# Patient Record
Sex: MALE | Race: OTHER | NOT HISPANIC OR LATINO | URBAN - METROPOLITAN AREA
[De-identification: names, ages, dates, MRNs, and addresses within clinical notes are randomized per-mention and may not be internally consistent; named-entity substitution may affect disease eponyms.]

---

## 2021-04-10 ENCOUNTER — EMERGENCY (EMERGENCY)
Facility: HOSPITAL | Age: 76
LOS: 1 days | Discharge: HOME | End: 2021-04-10
Admitting: EMERGENCY MEDICINE
Payer: MEDICAID

## 2021-04-10 VITALS
HEART RATE: 65 BPM | RESPIRATION RATE: 17 BRPM | TEMPERATURE: 98 F | SYSTOLIC BLOOD PRESSURE: 108 MMHG | OXYGEN SATURATION: 100 % | DIASTOLIC BLOOD PRESSURE: 78 MMHG

## 2021-04-10 VITALS
OXYGEN SATURATION: 99 % | HEART RATE: 75 BPM | DIASTOLIC BLOOD PRESSURE: 74 MMHG | TEMPERATURE: 98 F | SYSTOLIC BLOOD PRESSURE: 116 MMHG | RESPIRATION RATE: 18 BRPM

## 2021-04-10 LAB
ALBUMIN SERPL ELPH-MCNC: 4.3 G/DL — SIGNIFICANT CHANGE UP (ref 3.3–5)
ALP SERPL-CCNC: 66 U/L — SIGNIFICANT CHANGE UP (ref 40–120)
ALT FLD-CCNC: 18 U/L — SIGNIFICANT CHANGE UP (ref 4–41)
ANION GAP SERPL CALC-SCNC: 11 MMOL/L — SIGNIFICANT CHANGE UP (ref 7–14)
APPEARANCE UR: CLEAR — SIGNIFICANT CHANGE UP
AST SERPL-CCNC: 17 U/L — SIGNIFICANT CHANGE UP (ref 4–40)
BASOPHILS # BLD AUTO: 0.05 K/UL — SIGNIFICANT CHANGE UP (ref 0–0.2)
BASOPHILS NFR BLD AUTO: 0.6 % — SIGNIFICANT CHANGE UP (ref 0–2)
BILIRUB SERPL-MCNC: 0.5 MG/DL — SIGNIFICANT CHANGE UP (ref 0.2–1.2)
BILIRUB UR-MCNC: NEGATIVE — SIGNIFICANT CHANGE UP
BUN SERPL-MCNC: 19 MG/DL — SIGNIFICANT CHANGE UP (ref 7–23)
CALCIUM SERPL-MCNC: 9.3 MG/DL — SIGNIFICANT CHANGE UP (ref 8.4–10.5)
CHLORIDE SERPL-SCNC: 106 MMOL/L — SIGNIFICANT CHANGE UP (ref 98–107)
CO2 SERPL-SCNC: 22 MMOL/L — SIGNIFICANT CHANGE UP (ref 22–31)
COLOR SPEC: SIGNIFICANT CHANGE UP
CREAT SERPL-MCNC: 1.88 MG/DL — HIGH (ref 0.5–1.3)
DIFF PNL FLD: NEGATIVE — SIGNIFICANT CHANGE UP
EOSINOPHIL # BLD AUTO: 0.34 K/UL — SIGNIFICANT CHANGE UP (ref 0–0.5)
EOSINOPHIL NFR BLD AUTO: 4.2 % — SIGNIFICANT CHANGE UP (ref 0–6)
GLUCOSE SERPL-MCNC: 93 MG/DL — SIGNIFICANT CHANGE UP (ref 70–99)
GLUCOSE UR QL: NEGATIVE — SIGNIFICANT CHANGE UP
HCT VFR BLD CALC: 43.6 % — SIGNIFICANT CHANGE UP (ref 39–50)
HGB BLD-MCNC: 14.6 G/DL — SIGNIFICANT CHANGE UP (ref 13–17)
IANC: 4.78 K/UL — SIGNIFICANT CHANGE UP (ref 1.5–8.5)
IMM GRANULOCYTES NFR BLD AUTO: 0.1 % — SIGNIFICANT CHANGE UP (ref 0–1.5)
KETONES UR-MCNC: NEGATIVE — SIGNIFICANT CHANGE UP
LEUKOCYTE ESTERASE UR-ACNC: NEGATIVE — SIGNIFICANT CHANGE UP
LYMPHOCYTES # BLD AUTO: 2.05 K/UL — SIGNIFICANT CHANGE UP (ref 1–3.3)
LYMPHOCYTES # BLD AUTO: 25.6 % — SIGNIFICANT CHANGE UP (ref 13–44)
MCHC RBC-ENTMCNC: 29.1 PG — SIGNIFICANT CHANGE UP (ref 27–34)
MCHC RBC-ENTMCNC: 33.5 GM/DL — SIGNIFICANT CHANGE UP (ref 32–36)
MCV RBC AUTO: 87 FL — SIGNIFICANT CHANGE UP (ref 80–100)
MONOCYTES # BLD AUTO: 0.78 K/UL — SIGNIFICANT CHANGE UP (ref 0–0.9)
MONOCYTES NFR BLD AUTO: 9.7 % — SIGNIFICANT CHANGE UP (ref 2–14)
NEUTROPHILS # BLD AUTO: 4.78 K/UL — SIGNIFICANT CHANGE UP (ref 1.8–7.4)
NEUTROPHILS NFR BLD AUTO: 59.8 % — SIGNIFICANT CHANGE UP (ref 43–77)
NITRITE UR-MCNC: NEGATIVE — SIGNIFICANT CHANGE UP
NRBC # BLD: 0 /100 WBCS — SIGNIFICANT CHANGE UP
NRBC # FLD: 0 K/UL — SIGNIFICANT CHANGE UP
PCP SPEC-MCNC: SIGNIFICANT CHANGE UP
PH UR: 6.5 — SIGNIFICANT CHANGE UP (ref 5–8)
PLATELET # BLD AUTO: 210 K/UL — SIGNIFICANT CHANGE UP (ref 150–400)
POTASSIUM SERPL-MCNC: 4.4 MMOL/L — SIGNIFICANT CHANGE UP (ref 3.5–5.3)
POTASSIUM SERPL-SCNC: 4.4 MMOL/L — SIGNIFICANT CHANGE UP (ref 3.5–5.3)
PROT SERPL-MCNC: 7.3 G/DL — SIGNIFICANT CHANGE UP (ref 6–8.3)
PROT UR-MCNC: ABNORMAL
RBC # BLD: 5.01 M/UL — SIGNIFICANT CHANGE UP (ref 4.2–5.8)
RBC # FLD: 13.5 % — SIGNIFICANT CHANGE UP (ref 10.3–14.5)
SARS-COV-2 RNA SPEC QL NAA+PROBE: SIGNIFICANT CHANGE UP
SODIUM SERPL-SCNC: 139 MMOL/L — SIGNIFICANT CHANGE UP (ref 135–145)
SP GR SPEC: 1.02 — SIGNIFICANT CHANGE UP (ref 1.01–1.02)
TOXICOLOGY SCREEN, DRUGS OF ABUSE, SERUM RESULT: SIGNIFICANT CHANGE UP
TSH SERPL-MCNC: 0.92 UIU/ML — SIGNIFICANT CHANGE UP (ref 0.27–4.2)
UROBILINOGEN FLD QL: SIGNIFICANT CHANGE UP
WBC # BLD: 8.01 K/UL — SIGNIFICANT CHANGE UP (ref 3.8–10.5)
WBC # FLD AUTO: 8.01 K/UL — SIGNIFICANT CHANGE UP (ref 3.8–10.5)

## 2021-04-10 PROCEDURE — 99285 EMERGENCY DEPT VISIT HI MDM: CPT

## 2021-04-10 RX ORDER — HALOPERIDOL DECANOATE 100 MG/ML
5 INJECTION INTRAMUSCULAR ONCE
Refills: 0 | Status: COMPLETED | OUTPATIENT
Start: 2021-04-10 | End: 2021-04-10

## 2021-04-10 RX ADMIN — HALOPERIDOL DECANOATE 5 MILLIGRAM(S): 100 INJECTION INTRAMUSCULAR at 17:24

## 2021-04-10 NOTE — ED ADULT NURSE NOTE - HPI (INCLUDE ILLNESS QUALITY, SEVERITY, DURATION, TIMING, CONTEXT, MODIFYING FACTORS, ASSOCIATED SIGNS AND SYMPTOMS)
Pt reports hearing voices telling him to kill himself after having sexual dreams. PT appears to be sexually focus in his description of recent HX. At time of assessment PT is calm and compliant with care. PT offers no medical complaints. Pt reports being complaint with meds although can not name them

## 2021-04-10 NOTE — ED BEHAVIORAL HEALTH NOTE - BEHAVIORAL HEALTH NOTE
Pt is a 75 year old male arriving from [facility] assisted living facility. Writer contacted [facility] at [phone]. Writer spoke with Austyn at  who reports that pt went to hospital because he was hearing voices. Staff member believes that pt asked to come to hospital. Staff denies any safety concerns for him or his behavior says he would have been told of additional information if there had been. No further information able to be provided and declined other staff being available to speak with. Pt was said to have a mental health hx however staff member reported “not qualified to give that information”. No concerns for pt returning to residence at this time. Pt said to be able to travel independently by “[facility]”. Verbal huddle occurred with REINALDO Caceres. Ambulance transportation to be arranged for safety. Pt is a 75 year old male arriving from [facility] assisted living facility. Writer contacted [facility] at [phone]. Writer spoke with Austyn at  who reports that pt went to hospital because he was hearing voices. Staff member believes that pt asked to come to hospital. Staff denies any safety concerns for him or his behavior says he would have been told of additional information if there had been. No further information able to be provided and declined other staff being available to speak with. Pt was said to have a mental health hx however staff member reported “not qualified to give that information”. No concerns for pt returning to residence at this time. Pt said to be able to travel independently by “[facility]”. Verbal huddle occurred with REINALDO Caceres. Ambulance transportation to be arranged for safety.    MEDICAID #[id number]  [employer] #[id number]  MEDICARE Pt is a 75 year old male arriving from [facility] assisted living facility. Writer contacted [facility] at [phone]. Writer spoke with Austyn at  who reports that pt went to hospital because he was hearing voices. Staff member believes that pt asked to come to hospital. Staff denies any safety concerns for him or his behavior says he would have been told of additional information if there had been. No further information able to be provided and declined other staff being available to speak with. Pt was said to have a mental health hx however staff member reported “not qualified to give that information”. No concerns for pt returning to residence at this time. Pt said to be able to travel independently by “[facility]”. Verbal huddle occurred with REINALDO Caceres. Ambulance transportation to be arranged for safety.    Insurance:   MEDICAID #[id number]  [employer] #[id number] Pt is a 75 year old male arriving from [facility] assisted living facility. Writer contacted [facility] at [phone]. Writer spoke with Austyn at  who reports that pt went to hospital because he was hearing voices. Staff member believes that pt asked to come to hospital. Staff denies any safety concerns for him or his behavior says he would have been told of additional information if there had been. No further information able to be provided and declined other staff being available to speak with. Pt was said to have a mental health hx however staff member reported “not qualified to give that information”. No concerns for pt returning to residence at this time. Pt said to be able to travel independently by “[facility]”. Verbal huddle occurred with REINALDO Caceres. Ambulance transportation to be arranged for safety.    Writer called back [facility] and spoke again with Austyn who was informed of pt's discharge. Writer received the following insurance information for pt as none listed in chart (MEDICAID #[id number]  and [employer] #[id number]). Writer searched [facility] only portal with pt not found. No medicare found to be listed as per staff member. Writer contacted [facility] and spoke with Alfonso who scheduled trip with trip #[id number]. Call transferred to nursing for report.

## 2021-04-10 NOTE — ED ADULT NURSE REASSESSMENT NOTE - NS ED NURSE REASSESS COMMENT FT1
Break RN: Pt is calm and cooperating, sitting comfortable, breathing non-labored. Will continue to monitor

## 2021-04-10 NOTE — ED PROVIDER NOTE - CLINICAL SUMMARY MEDICAL DECISION MAKING FREE TEXT BOX
74 y/o hx  M B  Medical evaluation performed. There is no clinical evidence of intoxication or any acute medical problem requiring immediate intervention.  Medication offered. Tolerated same well.   SW consulted. D/C to [facility] via EMS. 74 y/o hx  M   Labs, Urine Tox/UA, EKG    Medical evaluation performed. There is no clinical evidence of intoxication or any acute medical problem requiring immediate intervention.  Medication offered. Tolerated same well.   SW consulted. D/C to [facility] via EMS.

## 2021-04-10 NOTE — ED PROVIDER NOTE - QUALITY
anxiety producing Metronidazole Pregnancy And Lactation Text: This medication is Pregnancy Category B and considered safe during pregnancy.  It is also excreted in breast milk.

## 2021-04-10 NOTE — ED ADULT TRIAGE NOTE - CHIEF COMPLAINT QUOTE
Pt BIBA from [facility] facility, PMH of HTN and schizophrenia, states he takes his psych meds but doesn't remember the name of them. Pt reports non-command auditory hallucinations for the past 2 years but states they got worse for the past 2 days. Pt denies visual hallucinations, denies alcohol or drug use, denies any physical complaints. Denies SI/HI

## 2021-04-10 NOTE — ED ADULT TRIAGE NOTE - NS ED NURSE BANDS TYPE
symptoms  Outcome: Ongoing     Problem: Nutrition  Goal: Optimal nutrition therapy  Outcome: Ongoing  Note:   Pt with Osmolite infusing via PEG per order. Pt tolerating at this time. Will continue to collaborate with RDs. Name band;

## 2021-04-10 NOTE — ED PROVIDER NOTE - OBJECTIVE STATEMENT
74 y/o hx  M BIBA from  [facility]  secondary to  anxiousness.   Admits to  that the residents yells and screams to the point that  he consistently hear voices. States " I need a time away from there".  Explicitly  denies SI/HI/VH.  Denies falling, punching or kicking any objects. Denies pain, SOB,  fever, chills, chest/ abdominal discomfort.   Denies use of alcohol or illicit drugs.  No evidence of physical injuries, broken skin or deformities.

## 2021-04-10 NOTE — ED ADULT NURSE NOTE - CHIEF COMPLAINT QUOTE
Pt BIBA from [facility] facility, PMH of HTN and schizophrenia, states he takes his psych meds but doesn't remember the name of them. Pt reports non-command auditory hallucinations for the past 2 years but states they got worse for the past 2 days. Pt denies visual hallucinations, denies alcohol or drug use, denies any physical complaints. Denies SI/HI

## 2021-04-10 NOTE — ED PROVIDER NOTE - PATIENT PORTAL LINK FT
You can access the FollowMyHealth Patient Portal offered by [facility] by registering at the following website: [email or web address]. By joining [facility]’s FollowMyHealth portal, you will also be able to view your health information using other applications (apps) compatible with our system.

## 2021-04-10 NOTE — ED ADULT NURSE NOTE - NSIMPLEMENTINTERV_GEN_ALL_ED
Implemented All Universal Safety Interventions:  Aldie to call system. Call bell, personal items and telephone within reach. Instruct patient to call for assistance. Room bathroom lighting operational. Non-slip footwear when patient is off stretcher. Physically safe environment: no spills, clutter or unnecessary equipment. Stretcher in lowest position, wheels locked, appropriate side rails in place.

## 2021-04-11 LAB
COVID-19 SPIKE DOMAIN AB INTERP: POSITIVE
COVID-19 SPIKE DOMAIN ANTIBODY RESULT: >250 U/ML — HIGH
SARS-COV-2 IGG+IGM SERPL QL IA: >250 U/ML — HIGH
SARS-COV-2 IGG+IGM SERPL QL IA: POSITIVE

## 2021-04-13 NOTE — ED POST DISCHARGE NOTE - REASON FOR FOLLOW-UP
Other COVID-19 AB : positive. Patient contact # [phone] message left with Call Back  P.A. number and hours for return call back. No alt # listed.

## 2022-03-25 ENCOUNTER — EMERGENCY (EMERGENCY)
Facility: HOSPITAL | Age: 77
LOS: 0 days | Discharge: HOME | End: 2022-03-25
Attending: EMERGENCY MEDICINE | Admitting: EMERGENCY MEDICINE
Payer: MEDICAID

## 2022-03-25 VITALS
OXYGEN SATURATION: 97 % | HEART RATE: 59 BPM | SYSTOLIC BLOOD PRESSURE: 169 MMHG | TEMPERATURE: 98 F | WEIGHT: 179.9 LBS | RESPIRATION RATE: 18 BRPM | DIASTOLIC BLOOD PRESSURE: 91 MMHG

## 2022-03-25 VITALS
DIASTOLIC BLOOD PRESSURE: 89 MMHG | HEART RATE: 60 BPM | SYSTOLIC BLOOD PRESSURE: 155 MMHG | RESPIRATION RATE: 16 BRPM | OXYGEN SATURATION: 98 %

## 2022-03-25 DIAGNOSIS — R44.0 AUDITORY HALLUCINATIONS: ICD-10-CM

## 2022-03-25 DIAGNOSIS — F20.9 SCHIZOPHRENIA, UNSPECIFIED: ICD-10-CM

## 2022-03-25 DIAGNOSIS — R00.1 BRADYCARDIA, UNSPECIFIED: ICD-10-CM

## 2022-03-25 DIAGNOSIS — F41.9 ANXIETY DISORDER, UNSPECIFIED: ICD-10-CM

## 2022-03-25 DIAGNOSIS — Z20.822 CONTACT WITH AND (SUSPECTED) EXPOSURE TO COVID-19: ICD-10-CM

## 2022-03-25 DIAGNOSIS — F32.A DEPRESSION, UNSPECIFIED: ICD-10-CM

## 2022-03-25 DIAGNOSIS — I49.1 ATRIAL PREMATURE DEPOLARIZATION: ICD-10-CM

## 2022-03-25 DIAGNOSIS — F17.200 NICOTINE DEPENDENCE, UNSPECIFIED, UNCOMPLICATED: ICD-10-CM

## 2022-03-25 DIAGNOSIS — F03.90 UNSPECIFIED DEMENTIA WITHOUT BEHAVIORAL DISTURBANCE: ICD-10-CM

## 2022-03-25 LAB
ANION GAP SERPL CALC-SCNC: 10 MMOL/L — SIGNIFICANT CHANGE UP (ref 7–14)
APAP SERPL-MCNC: <5 UG/ML — LOW (ref 10–30)
BASOPHILS # BLD AUTO: 0.06 K/UL — SIGNIFICANT CHANGE UP (ref 0–0.2)
BASOPHILS NFR BLD AUTO: 0.9 % — SIGNIFICANT CHANGE UP (ref 0–1)
BUN SERPL-MCNC: 17 MG/DL — SIGNIFICANT CHANGE UP (ref 10–20)
CALCIUM SERPL-MCNC: 9.3 MG/DL — SIGNIFICANT CHANGE UP (ref 8.5–10.1)
CHLORIDE SERPL-SCNC: 107 MMOL/L — SIGNIFICANT CHANGE UP (ref 98–110)
CO2 SERPL-SCNC: 24 MMOL/L — SIGNIFICANT CHANGE UP (ref 17–32)
CREAT SERPL-MCNC: 1.5 MG/DL — SIGNIFICANT CHANGE UP (ref 0.7–1.5)
EGFR: 49 ML/MIN/1.73M2 — LOW
EOSINOPHIL # BLD AUTO: 0.54 K/UL — SIGNIFICANT CHANGE UP (ref 0–0.7)
EOSINOPHIL NFR BLD AUTO: 8.5 % — HIGH (ref 0–8)
ETHANOL SERPL-MCNC: <10 MG/DL — SIGNIFICANT CHANGE UP
GLUCOSE SERPL-MCNC: 88 MG/DL — SIGNIFICANT CHANGE UP (ref 70–99)
HCT VFR BLD CALC: 45 % — SIGNIFICANT CHANGE UP (ref 42–52)
HGB BLD-MCNC: 14.9 G/DL — SIGNIFICANT CHANGE UP (ref 14–18)
IMM GRANULOCYTES NFR BLD AUTO: 0.2 % — SIGNIFICANT CHANGE UP (ref 0.1–0.3)
LYMPHOCYTES # BLD AUTO: 1.73 K/UL — SIGNIFICANT CHANGE UP (ref 1.2–3.4)
LYMPHOCYTES # BLD AUTO: 27.4 % — SIGNIFICANT CHANGE UP (ref 20.5–51.1)
MCHC RBC-ENTMCNC: 28.6 PG — SIGNIFICANT CHANGE UP (ref 27–31)
MCHC RBC-ENTMCNC: 33.1 G/DL — SIGNIFICANT CHANGE UP (ref 32–37)
MCV RBC AUTO: 86.4 FL — SIGNIFICANT CHANGE UP (ref 80–94)
MONOCYTES # BLD AUTO: 0.44 K/UL — SIGNIFICANT CHANGE UP (ref 0.1–0.6)
MONOCYTES NFR BLD AUTO: 7 % — SIGNIFICANT CHANGE UP (ref 1.7–9.3)
NEUTROPHILS # BLD AUTO: 3.54 K/UL — SIGNIFICANT CHANGE UP (ref 1.4–6.5)
NEUTROPHILS NFR BLD AUTO: 56 % — SIGNIFICANT CHANGE UP (ref 42.2–75.2)
NRBC # BLD: 0 /100 WBCS — SIGNIFICANT CHANGE UP (ref 0–0)
PLATELET # BLD AUTO: 176 K/UL — SIGNIFICANT CHANGE UP (ref 130–400)
POTASSIUM SERPL-MCNC: 4.1 MMOL/L — SIGNIFICANT CHANGE UP (ref 3.5–5)
POTASSIUM SERPL-SCNC: 4.1 MMOL/L — SIGNIFICANT CHANGE UP (ref 3.5–5)
RBC # BLD: 5.21 M/UL — SIGNIFICANT CHANGE UP (ref 4.7–6.1)
RBC # FLD: 13.7 % — SIGNIFICANT CHANGE UP (ref 11.5–14.5)
SALICYLATES SERPL-MCNC: <0.3 MG/DL — LOW (ref 4–30)
SARS-COV-2 RNA SPEC QL NAA+PROBE: SIGNIFICANT CHANGE UP
SODIUM SERPL-SCNC: 141 MMOL/L — SIGNIFICANT CHANGE UP (ref 135–146)
WBC # BLD: 6.32 K/UL — SIGNIFICANT CHANGE UP (ref 4.8–10.8)
WBC # FLD AUTO: 6.32 K/UL — SIGNIFICANT CHANGE UP (ref 4.8–10.8)

## 2022-03-25 PROCEDURE — 99285 EMERGENCY DEPT VISIT HI MDM: CPT

## 2022-03-25 PROCEDURE — 93010 ELECTROCARDIOGRAM REPORT: CPT

## 2022-03-25 PROCEDURE — 90792 PSYCH DIAG EVAL W/MED SRVCS: CPT | Mod: 95

## 2022-03-25 RX ORDER — LISINOPRIL 2.5 MG/1
1 TABLET ORAL
Qty: 0 | Refills: 0 | DISCHARGE

## 2022-03-25 RX ORDER — LORATADINE 10 MG/1
1 TABLET ORAL
Qty: 0 | Refills: 0 | DISCHARGE

## 2022-03-25 RX ORDER — TRAZODONE HCL 50 MG
1 TABLET ORAL
Qty: 0 | Refills: 0 | DISCHARGE

## 2022-03-25 RX ORDER — OLANZAPINE 15 MG/1
1 TABLET, FILM COATED ORAL
Qty: 0 | Refills: 0 | DISCHARGE

## 2022-03-25 RX ORDER — FLUTICASONE PROPIONATE AND SALMETEROL 50; 250 UG/1; UG/1
1 POWDER ORAL; RESPIRATORY (INHALATION)
Qty: 0 | Refills: 0 | DISCHARGE

## 2022-03-25 RX ORDER — ERGOCALCIFEROL 1.25 MG/1
1 CAPSULE ORAL
Qty: 0 | Refills: 0 | DISCHARGE

## 2022-03-25 RX ORDER — ESCITALOPRAM OXALATE 10 MG/1
1 TABLET, FILM COATED ORAL
Qty: 0 | Refills: 0 | DISCHARGE

## 2022-03-25 RX ORDER — PROPRANOLOL HCL 160 MG
1 CAPSULE, EXTENDED RELEASE 24HR ORAL
Qty: 0 | Refills: 0 | DISCHARGE

## 2022-03-25 NOTE — ED BEHAVIORAL HEALTH ASSESSMENT NOTE - DETAILS
army medic/ deferred self referred; [facility] d/w pt's residence see  Safety Plan note denies

## 2022-03-25 NOTE — ED PROVIDER NOTE - PHYSICAL EXAMINATION
CONSTITUTIONAL: In no apparent distress.   HEAD: Normocephalic; atraumatic.   EYES: Pupils are round and reactive, extra-ocular muscles are intact. Eyelids are normal in appearance without swelling or lesions.   ENT: Hearing is intact with good acuity to spoken voice.  Patient is speaking clearly, not muffled and airway is intact.   NECK: No midline tenderness  RESPIRATORY: No signs of respiratory distress. Lung sounds are clear in all lobes bilaterally without rales, rhonchi, or wheezes.  CARDIOVASCULAR: Regular rate and rhythm.   GI: Abdomen is soft, non-tender, and without distention. Bowel sounds are present and normoactive in all four quadrants. No masses are noted.   NEURO: A & O x2. Normal speech.

## 2022-03-25 NOTE — ED BEHAVIORAL HEALTH ASSESSMENT NOTE - SUMMARY
The patient is a 76-year-old male; domiciled at [facility]; non-caregiver; PPHx of schizophrenia[facility], denies hx of SIB/SA, denies hx of violence; no significant PMHx per ED provider; tobacco use, denies other substance use; BIB EMS activated by residence per pt's request; psychiatry consulted for AH.  Pt reports AH but denies that they are commanding in nature, denies SI/HI, and does not appear acutely depressed, psychotic, manic, anxious, agitated, or intoxicated.  He is pleasant, linear, with full affect (smiling/laughing at times).  His residence staff has no immediate safety concerns.  Pt offered but declined voluntary admission and he does not meet criteria for involuntary admission at this time.

## 2022-03-25 NOTE — ED ADULT TRIAGE NOTE - CHIEF COMPLAINT QUOTE
BIBA from [facility] as per pt "I am hearing voices telling me perverted thoughts and telling me to do bad things for the last 3 days". Denies SI/HI

## 2022-03-25 NOTE — ED BEHAVIORAL HEALTH ASSESSMENT NOTE - SAFETY PLAN ADDT'L DETAILS
Safety plan discussed with.../Education provided regarding environmental safety / lethal means restriction/Provision of National Suicide Prevention Lifeline 5-841-868-JJSB (8369)

## 2022-03-25 NOTE — ED PROVIDER NOTE - NSFOLLOWUPCLINICS_GEN_ALL_ED_FT
[facility]  OB/GYN  [address] [place] [address]  Phone: [phone]  Fax:   Follow Up Time: 1-3 Days     [facility]  OP Mental Health  [address] [place] [id number]  Phone: [phone]  Fax:   Follow Up Time: 1-3 Days

## 2022-03-25 NOTE — ED BEHAVIORAL HEALTH NOTE - BEHAVIORAL HEALTH NOTE
===================  PRE-HOSPITAL COURSE  ===================  SOURCE:  Second-hand information via EMR documentation and primary [facility]eimy.  DETAILS:  Patient BIBA from [facility] with c/o AH.   ============  ED COURSE   ============  SOURCE:  Second-hand information via EMR documentation and primary RNYeimy.  ARRIVAL:  Patient BIBA from [facility] with c/o , with no noted incidents.   BELONGINGS:  Clothing.   BEHAVIOR: Upon arrival patient noted to be in behavioral control - states he's been hearing voices, telling him promiscuous things for the past few days. He denies SI/HI and delusions. He complied with triage protocols - provided blood, urine and changed into a gown without incident. Speech, hygiene and eye contact are all WNL. No aggression or behavioral issues reported.   TREATMENT:  No prn medications, security interventions or behavioral modifications required.   VISITORS:  Unaccompanied by family or social supports.     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   RELIABILITY: Limited.     Collateral (Name, relationship to patient) has requested that the information provided remain confidential: Yes [X  ] No [  ]    Collateral (Name, relationship to patient) has provided information that patient is/may be unaware of:      Yes [ X ] No [  ]  ========================  HPI:    Patients group home clinical staff member states that per second hand information from a non-eyewitness patient reported to staff that he's been hearing voices for the past few days. She notes that patient did not divulge what the voices were telling him; rather stated that the voices are causing him to feel nervous. She denies any vulgar or promiscuous behaviors. She states that at baseline patient is euthymic and mild-mannered and for the past few days has appeared to be worrisome. She notes that patient was hospitalized at [facility] a few months ago due to similar symptoms. She notes that upon his discharge, patient was functioning at baseline and compliant with his medications, of which he remains compliant. She denies any aggression, agitation or obstruction to property. She denies SI/SA/SIB or HI. She notes that patient asked staff to activate EMS to the facility. She has no safety concerns for self-harm or harm to others and believes that patient would benefit from both a medical and psychiatric evaluation.

## 2022-03-25 NOTE — ED PROVIDER NOTE - PATIENT PORTAL LINK FT
You can access the FollowMyHealth Patient Portal offered by [facility] by registering at the following website: [email or web address]. By joining [facility]’s FollowMyHealth portal, you will also be able to view your health information using other applications (apps) compatible with our system.

## 2022-03-25 NOTE — ED PROVIDER NOTE - CLINICAL SUMMARY MEDICAL DECISION MAKING FREE TEXT BOX
Diagnostic testing reviewed.  Patient is medically cleared for psychiatric examination.  Case discussed with psychiatric consultant.  Cleared for discharge.

## 2022-03-25 NOTE — ED PROVIDER NOTE - ATTENDING CONTRIBUTION TO CARE
Patient has a longstanding history of schizoaffective disorder.  He presents to the ED for delusions that involve sexual encounters with devils.  He denies suicidal or homicidal ideation.  In the ED he is calm.  Vital signs noted.  He is alert.  Diagnostic testing reviewed.  Case discussed with psych.  Stable for discharge.

## 2022-03-25 NOTE — ED BEHAVIORAL HEALTH ASSESSMENT NOTE - RISK ASSESSMENT
risk factors: male, single, prior admissions, psych dx    protective factors: no known hx of SIB/SA, no known hx of violence, denies SI/HI, Quaker, denies access to guns/weapons, future oriented, identifies reasons to live, able to safety plan, denies anhedonia, denies insomnia, help seeking Low Acute Suicide Risk

## 2022-03-25 NOTE — ED PROVIDER NOTE - NS ED ATTENDING STATEMENT MOD
This was a shared visit with the SEVERIANO. I reviewed and verified the documentation and independently performed the documented:

## 2022-03-25 NOTE — ED BEHAVIORAL HEALTH ASSESSMENT NOTE - CURRENT MEDICATION
pt unable to recall     per group home paperwork: claritin 10 mg daily, lexapro 20 mg daily, prinivil 10 mg daily, zyprexa 10 mg daily / 20 mg at 5pm, propranolol 10 mg BID, trazodone 50 mg qhs, nicorette tablets PRN, albuterol inhaler PRN, vitamin D2 weekly, vitamin D3 BID, fluticasone/salmeterol inhaler BID, latanprost eye drops, lisinopril 40 mg daily

## 2022-03-25 NOTE — ED BEHAVIORAL HEALTH ASSESSMENT NOTE - HPI (INCLUDE ILLNESS QUALITY, SEVERITY, DURATION, TIMING, CONTEXT, MODIFYING FACTORS, ASSOCIATED SIGNS AND SYMPTOMS)
The patient is a 76-year-old male; domiciled at [facility]; non-caregiver; PPHx of schizophrenia, [facility], [facility], denies hx of SIB/SA, denies hx of violence; no significant PMHx per ED provider; tobacco use, denies other substance use; BIB EMS activated by residence per pt's request; psychiatry consulted for AH.  Pt states he is being "tormented by spirits."  He states that they say "nasty" things about him and other people over the past few days.  He reports chronic hx of AH but states these are different.  He denies that they command him to do anything dangerous but sometimes they think of things for him to say.  He reports depressed/anxious mood due to the AH but otherwise denies depressive symptoms, denies other psychotic symptoms, denies manic symptoms.  He states that he likes his residence compared to his previous one though doesn't get along with his roommate (plans to ask leadership to change rooms).  Pt reports he is able to tend to ADLs though doesn't do them often because he doesn't like to (chronic for "all of his life").  [facility] tell [facility] to bring him there instead.  He was initially ambivalent about wanting to stay in the hospital but then states it might be a problem to stay and go through the admissions process.  He ultimately expressed preference to return to [facility] at this time and states he would call EMS again if needed.  Pt oriented to person, hospital, year, president, 3/3 immediate recall, 3/3 delayed recall.  Pt gives consent for collateral from his group home and outpatient provider.    Writer [facility] ([phone]).  She states that there is no psychiatrist or RN available to speak at this time.      Covid Screen - Patient  testing? pt uncertain, believes someone told him he had covid months ago but that it "cleared up"  vaccine? pt uncertain, believes he received them  exposures? denies

## 2022-03-25 NOTE — ED ADULT NURSE NOTE - CHIEF COMPLAINT QUOTE
BIBA from [facility] as per pt "I am hearing voices telling me perverted thoughts and telling me to do bad things for the last 3 days". Denies SI/HI

## 2022-03-25 NOTE — ED BEHAVIORAL HEALTH ASSESSMENT NOTE - NSSUICPROTFACT_PSY_ALL_CORE
Responsibility to children, family, or others/Identifies reasons for living/Cultural, spiritual and/or moral attitudes against suicide/Positive therapeutic relationships/Islam beliefs

## 2022-03-25 NOTE — ED PROVIDER NOTE - PROGRESS NOTE DETAILS
Spoke with tele psych and waiting for call by from psych attending. Spoke with tele psych and will see the patient pt cleared by psych.  clear for d/c. Discussed results with pt.  All questions were answered and return precautions discussed.  Pt is asx and comfortable at this time.  Unremarkable re-exam.  No further concerns at this time from pt.  Will follow up with PMD.  Pt understands and agrees with tx plan. DOLLY Dykes: I was directly involved in the management of this patient. Case was discussed with DOLLY Rosario

## 2022-03-25 NOTE — ED BEHAVIORAL HEALTH ASSESSMENT NOTE - PAST PSYCHOTROPIC MEDICATION
klonopin, fluphenazine (PO and decanoate), haldol (PO and decanoate), risperdal, wellbutrin, cogentin

## 2022-03-25 NOTE — ED PROVIDER NOTE - OBJECTIVE STATEMENT
77 y/o male with hx of schizophrenia who was sent in from his group home for hearing voices. Pt reports that he has been hearing voices from devils and tell them to have sexual intercourses and oral sex with them. Reports that he had hx of this symptom before. Denies HI and SI. Denies fever, SOB, chest pain, N/V, abdominal pain, urinary symptoms.

## 2022-03-25 NOTE — ED PROVIDER NOTE - IV ALTEPLASE ADMIN OUTSIDE HIDDEN
Reports that one week ago he was notified from sexual partner that she had hepatitis C. He did have unprotected sex with this partner. He denies abdominal pain, jaundice, or abnormal color urine or stool. He reports history of hepatitis several years ago due to IV drug use (he is unsure of type).  
show

## 2022-03-25 NOTE — ED PROVIDER NOTE - NS ED ROS FT
Constitutional: Negative for fever, chills, and fatigue.  HENT: Negative for headache  Cardiovascular: Negative for chest pain, and palpitation.  Respiratory: Negative for SOB  Gastrointestinal: Negative for nausea, vomiting, abdominal pain,  Genitourinary: Negative for flank pain, dysuria, frequency, and hematuria.  Neurological: Negative for dizziness, syncope, and loss of consciousness.  Hematological: Does not bruise/bleed easily.  Psychiatric/Behavioral: + delusion. Negative for anxiety/panic, SI/HI, and memory changes.

## 2022-03-25 NOTE — ED BEHAVIORAL HEALTH ASSESSMENT NOTE - OTHER PAST PSYCHIATRIC HISTORY (INCLUDE DETAILS REGARDING ONSET, COURSE OF ILLNESS, INPATIENT/OUTPATIENT TREATMENT)
per PSY[facility]ES: Schizophrenia | Schizoaffective Disorder | Tobacco related disorder | Major Depressive Disorder | Unspecified/Other Anxiety Disorder | Brief Psychotic Disorder (ICD10 Only) | Dementia (Neurocognitive) | Alcohol related disorders

## 2022-05-13 NOTE — ED BEHAVIORAL HEALTH ASSESSMENT NOTE - NS ED BHA REVIEW OF ED CHART AVAILABLE LABS REVIEWED
Patient presents to ED with parents who report patient Patient states that he noticed a rash this morning. Patient states rash is itchy. Rash to back and face. No swelling to lips. Airway intact. Patient has known allergies to peanuts, tree nuts, and shellfish. Patient denies eating any of these foods. Patient acting age appropriately, in no respiratory distress. Yes

## 2023-01-03 NOTE — ED BEHAVIORAL HEALTH ASSESSMENT NOTE - NS ED BHA DEMOGRAPHICS RACE
Unavailable Cellcept Pregnancy And Lactation Text: This medication is Pregnancy Category D and isn't considered safe during pregnancy. It is unknown if this medication is excreted in breast milk.

## 2024-03-03 ENCOUNTER — EMERGENCY (EMERGENCY)
Facility: HOSPITAL | Age: 79
LOS: 0 days | Discharge: NURSING, REHAB OR LONG-TERM CARE | End: 2024-03-03
Attending: EMERGENCY MEDICINE
Payer: MEDICAID

## 2024-03-03 VITALS
HEART RATE: 88 BPM | SYSTOLIC BLOOD PRESSURE: 126 MMHG | TEMPERATURE: 98 F | RESPIRATION RATE: 18 BRPM | DIASTOLIC BLOOD PRESSURE: 72 MMHG | OXYGEN SATURATION: 99 %

## 2024-03-03 VITALS — RESPIRATION RATE: 17 BRPM | OXYGEN SATURATION: 95 %

## 2024-03-03 DIAGNOSIS — F20.9 SCHIZOPHRENIA, UNSPECIFIED: ICD-10-CM

## 2024-03-03 DIAGNOSIS — F17.200 NICOTINE DEPENDENCE, UNSPECIFIED, UNCOMPLICATED: ICD-10-CM

## 2024-03-03 DIAGNOSIS — I45.10 UNSPECIFIED RIGHT BUNDLE-BRANCH BLOCK: ICD-10-CM

## 2024-03-03 DIAGNOSIS — F22 DELUSIONAL DISORDERS: ICD-10-CM

## 2024-03-03 DIAGNOSIS — Z20.822 CONTACT WITH AND (SUSPECTED) EXPOSURE TO COVID-19: ICD-10-CM

## 2024-03-03 DIAGNOSIS — R44.0 AUDITORY HALLUCINATIONS: ICD-10-CM

## 2024-03-03 LAB
ALBUMIN SERPL ELPH-MCNC: 4 G/DL — SIGNIFICANT CHANGE UP (ref 3.5–5.2)
ALP SERPL-CCNC: 72 U/L — SIGNIFICANT CHANGE UP (ref 30–115)
ALT FLD-CCNC: 14 U/L — SIGNIFICANT CHANGE UP (ref 0–41)
ANION GAP SERPL CALC-SCNC: 10 MMOL/L — SIGNIFICANT CHANGE UP (ref 7–14)
APAP SERPL-MCNC: <5 UG/ML — LOW (ref 10–30)
AST SERPL-CCNC: 16 U/L — SIGNIFICANT CHANGE UP (ref 0–41)
BASOPHILS # BLD AUTO: 0.04 K/UL — SIGNIFICANT CHANGE UP (ref 0–0.2)
BASOPHILS NFR BLD AUTO: 0.7 % — SIGNIFICANT CHANGE UP (ref 0–1)
BILIRUB SERPL-MCNC: 0.4 MG/DL — SIGNIFICANT CHANGE UP (ref 0.2–1.2)
BUN SERPL-MCNC: 15 MG/DL — SIGNIFICANT CHANGE UP (ref 10–20)
CALCIUM SERPL-MCNC: 8.8 MG/DL — SIGNIFICANT CHANGE UP (ref 8.4–10.5)
CHLORIDE SERPL-SCNC: 105 MMOL/L — SIGNIFICANT CHANGE UP (ref 98–110)
CO2 SERPL-SCNC: 25 MMOL/L — SIGNIFICANT CHANGE UP (ref 17–32)
CREAT SERPL-MCNC: 1.6 MG/DL — HIGH (ref 0.7–1.5)
EGFR: 44 ML/MIN/1.73M2 — LOW
EOSINOPHIL # BLD AUTO: 0.55 K/UL — SIGNIFICANT CHANGE UP (ref 0–0.7)
EOSINOPHIL NFR BLD AUTO: 9.8 % — HIGH (ref 0–8)
ETHANOL SERPL-MCNC: <10 MG/DL — SIGNIFICANT CHANGE UP
FLUAV AG NPH QL: SIGNIFICANT CHANGE UP
FLUBV AG NPH QL: SIGNIFICANT CHANGE UP
GLUCOSE SERPL-MCNC: 177 MG/DL — HIGH (ref 70–99)
HCT VFR BLD CALC: 43.6 % — SIGNIFICANT CHANGE UP (ref 42–52)
HGB BLD-MCNC: 14.7 G/DL — SIGNIFICANT CHANGE UP (ref 14–18)
IMM GRANULOCYTES NFR BLD AUTO: 0.2 % — SIGNIFICANT CHANGE UP (ref 0.1–0.3)
LYMPHOCYTES # BLD AUTO: 1.4 K/UL — SIGNIFICANT CHANGE UP (ref 1.2–3.4)
LYMPHOCYTES # BLD AUTO: 24.9 % — SIGNIFICANT CHANGE UP (ref 20.5–51.1)
MCHC RBC-ENTMCNC: 29.5 PG — SIGNIFICANT CHANGE UP (ref 27–31)
MCHC RBC-ENTMCNC: 33.7 G/DL — SIGNIFICANT CHANGE UP (ref 32–37)
MCV RBC AUTO: 87.6 FL — SIGNIFICANT CHANGE UP (ref 80–94)
MONOCYTES # BLD AUTO: 0.33 K/UL — SIGNIFICANT CHANGE UP (ref 0.1–0.6)
MONOCYTES NFR BLD AUTO: 5.9 % — SIGNIFICANT CHANGE UP (ref 1.7–9.3)
NEUTROPHILS # BLD AUTO: 3.29 K/UL — SIGNIFICANT CHANGE UP (ref 1.4–6.5)
NEUTROPHILS NFR BLD AUTO: 58.5 % — SIGNIFICANT CHANGE UP (ref 42.2–75.2)
NRBC # BLD: 0 /100 WBCS — SIGNIFICANT CHANGE UP (ref 0–0)
PLATELET # BLD AUTO: 176 K/UL — SIGNIFICANT CHANGE UP (ref 130–400)
PMV BLD: 10.8 FL — HIGH (ref 7.4–10.4)
POTASSIUM SERPL-MCNC: 4 MMOL/L — SIGNIFICANT CHANGE UP (ref 3.5–5)
POTASSIUM SERPL-SCNC: 4 MMOL/L — SIGNIFICANT CHANGE UP (ref 3.5–5)
PROT SERPL-MCNC: 7 G/DL — SIGNIFICANT CHANGE UP (ref 6–8)
RBC # BLD: 4.98 M/UL — SIGNIFICANT CHANGE UP (ref 4.7–6.1)
RBC # FLD: 14.3 % — SIGNIFICANT CHANGE UP (ref 11.5–14.5)
RSV RNA NPH QL NAA+NON-PROBE: SIGNIFICANT CHANGE UP
SALICYLATES SERPL-MCNC: <0.3 MG/DL — LOW (ref 4–30)
SARS-COV-2 RNA SPEC QL NAA+PROBE: SIGNIFICANT CHANGE UP
SODIUM SERPL-SCNC: 140 MMOL/L — SIGNIFICANT CHANGE UP (ref 135–146)
WBC # BLD: 5.62 K/UL — SIGNIFICANT CHANGE UP (ref 4.8–10.8)
WBC # FLD AUTO: 5.62 K/UL — SIGNIFICANT CHANGE UP (ref 4.8–10.8)

## 2024-03-03 PROCEDURE — 99285 EMERGENCY DEPT VISIT HI MDM: CPT | Mod: 25

## 2024-03-03 PROCEDURE — 0241U: CPT

## 2024-03-03 PROCEDURE — 93005 ELECTROCARDIOGRAM TRACING: CPT

## 2024-03-03 PROCEDURE — 99285 EMERGENCY DEPT VISIT HI MDM: CPT

## 2024-03-03 PROCEDURE — 36415 COLL VENOUS BLD VENIPUNCTURE: CPT

## 2024-03-03 PROCEDURE — 85025 COMPLETE CBC W/AUTO DIFF WBC: CPT

## 2024-03-03 PROCEDURE — 80053 COMPREHEN METABOLIC PANEL: CPT

## 2024-03-03 PROCEDURE — 71045 X-RAY EXAM CHEST 1 VIEW: CPT | Mod: 26

## 2024-03-03 PROCEDURE — 71045 X-RAY EXAM CHEST 1 VIEW: CPT

## 2024-03-03 PROCEDURE — 93010 ELECTROCARDIOGRAM REPORT: CPT

## 2024-03-03 PROCEDURE — 94640 AIRWAY INHALATION TREATMENT: CPT

## 2024-03-03 PROCEDURE — 80307 DRUG TEST PRSMV CHEM ANLYZR: CPT

## 2024-03-03 PROCEDURE — 90792 PSYCH DIAG EVAL W/MED SRVCS: CPT | Mod: 95

## 2024-03-03 RX ORDER — IPRATROPIUM/ALBUTEROL SULFATE 18-103MCG
3 AEROSOL WITH ADAPTER (GRAM) INHALATION ONCE
Refills: 0 | Status: COMPLETED | OUTPATIENT
Start: 2024-03-03 | End: 2024-03-03

## 2024-03-03 RX ADMIN — Medication 3 MILLILITER(S): at 12:11

## 2024-03-03 RX ADMIN — Medication 3 MILLILITER(S): at 12:10

## 2024-03-03 NOTE — ED BEHAVIORAL HEALTH ASSESSMENT NOTE - HPI (INCLUDE ILLNESS QUALITY, SEVERITY, DURATION, TIMING, CONTEXT, MODIFYING FACTORS, ASSOCIATED SIGNS AND SYMPTOMS)
74yo M, single, noncaregiver, domiciled at the [facility] assisted living facility,  (army medic/), PPHx of schizophrenia, [facility], [facility] a few months ago due to similar symptoms    COLLATERAL FROM [facility]'s outpatient [facility] office ([phone]).     PSYCKES: Schizophrenia | Schizoaffective Disorder | Tobacco related disorder | Major Depressive Disorder | Unspecified/Other Anxiety Disorder | Brief Psychotic Disorder (ICD10 Only) | Dementia (Neurocognitive) | Alcohol related disorders 74yo M, single, noncaregiver, was domiciled at the [facility] assisted living facility until 11/11/21 and now domiciled at, + [place] (army medic/) and linked with 's Affairs services, with charted hx of Schizophrenia, 9PSUCKES also showing MDD, Dementia, Alcohol Related Disorders), [facility], hospitalized at [facility] a few months ago due to similar symptoms, currently attending outpatient services at the [facility] office    COLLATERAL from staff at [facility] [phone]: / answered and looked at the log of who comes/goes. Patient NO LONGER a resident there. He left 11/11/2021    COLLATERAL FROM Pt's outpatient [facility] office ([phone]):     PSYCKES: Schizophrenia | Schizoaffective Disorder | Tobacco related disorder | Major Depressive Disorder | Unspecified/Other Anxiety Disorder | Brief Psychotic Disorder (ICD10 Only) | Dementia (Neurocognitive) | Alcohol related disorders    ISTOP Reference #: [id number] no record of Patient  CVM: no record of Patient 79yo AAM, single,  > 20 yrs ago, father of 5 children (not involved, last saw daughter Fuentes ~ 1.5 yrs ago in person), not in touch with his ex-wife, noncaregiver, was domiciled at the [facility] assisted living facility until 11/11/21 and now domiciled at [facility] on [address] , +  (army medic/) and linked with [place]'s Affairs services, with charted hx of Schizophrenia, PSYCKES also showing MDD, Dementia, Alcohol Related Disorders), [facility], hospitalized at [facility] a few months ago due to similar symptoms, currently attending outpatient services at the [facility], [facility] office    COLLATERAL from staff member HUSSAIN Hope [phone]'s [facility]. current meds: Lexapro 20mg PO qd, Norvasc 2.5mg , lisinopril 10mg po qd, Prolixin 2.5mg bid, Zyprexa 20mg PO bid, asthma inhaler, nasal spray, trazodone 50mg PO qhs standing. Sees Dr Cordero consulting psychiatrist     COLLATERAL from staff at [facility] [phone]: / answered and looked at the log of who comes/goes. Patient NO LONGER a resident there. He left 11/11/2021  COLLATERAL FROM Pt's outpatient [facility] office ([phone]):     PSYCKES: Schizophrenia | Schizoaffective Disorder | Tobacco related disorder | Major Depressive Disorder | Unspecified/Other Anxiety Disorder | Brief Psychotic Disorder (ICD10 Only) | Dementia (Neurocognitive) | Alcohol related disorders    ISTOP Reference #: [id number] no record of Patient  CVM: no record of Patient 77yo AAM, single,  > 20 yrs ago, father of 5 children (not involved, last saw daughter Fuentes ~ 1.5 yrs ago in person), not in touch with his ex-wife, noncaregiver, domiciled at 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. current meds: Lexapro 20mg PO qd, Norvasc 2.5mg , lisinopril 10mg po qd, Prolixin 2.5mg bid, Zyprexa 20mg PO bid, asthma inhaler, nasal spray, trazodone 50mg PO qhs standing. Sees Dr Cordero consulting psychiatrist     COLLATERAL from staff at [facility] [phone]: / answered and looked at the log of who comes/goes. Patient NO LONGER a resident there. He left 11/11/2021  COLLATERAL FROM Pt's outpatient [facility] office ([phone]):     PSYCKES: Schizophrenia | Schizoaffective Disorder | Tobacco related disorder | Major Depressive Disorder | Unspecified/Other Anxiety Disorder | Brief Psychotic Disorder (ICD10 Only) | Dementia (Neurocognitive) | Alcohol related disorders    ISTOP Reference #: [id number] no record of Patient  CVM: no record of Patient 79yo AAM, single,  > 20 yrs ago, father of 5 children (not involved, last saw daughter Fuentes ~ 1.5 yrs ago in person), not in touch with his ex-wife, noncaregiver, domiciled at [facility] on [address] , identifies as Restorationism, + [place]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     COLLATERAL from staff member GUILLERMO Hope [phone] from [facility]: Patient has been his usual self which includes having chronic auditory hallucinations which Pt feels comfortable disclosing to staff, Patient gets his medications dispensed to him and has been compliant. His current meds are: Lexapro 20mg PO qd, Norvasc 2.5mg , lisinopril 10mg po qd, Prolixin 2.5mg bid, Zyprexa 20mg PO bid, asthma inhaler, nasal spray, trazodone 50mg PO qhs standing. Sees Dr Cordero consulting psychiatrist (private) who sees patients at the [facility].     COLLATERAL from staff at [facility] [phone]: / answered and looked at the log of who comes/goes. Patient NO LONGER a resident there. He left 11/11/2021  COLLATERAL FROM Pt's outpatient [facility] office ([phone]):     PSYCKES: Schizophrenia | Schizoaffective Disorder | Tobacco related disorder | Major Depressive Disorder | Unspecified/Other Anxiety Disorder | Brief Psychotic Disorder (ICD10 Only) | Dementia (Neurocognitive) | Alcohol related disorders    ISTOP Reference #: [id number] no record of Patient  CVM: no record of Patient 77yo AAM, single,  > 20 yrs ago, father of 5 children (not involved, last saw daughter Fuentes ~ 1.5 yrs ago in person), not in touch with his ex-wife, noncaregiver, domiciled at [facility] located in [facility], close with his roommate, has a good friend Jonathon at [facility], has a brother named Cj (lives in [place]), identifies as Baptist, +  (army medic/) and linked with [place]'s Affairs services, has a Community  (Sally), with charted hx of Schizophrenia, PSYCKES also showing MDD, Dementia, Alcohol Related Disorders), [facility], hospitalized at [facility] a few months ago due to similar symptoms, currently attending outpatient services at the [facility], [facility] office (sees Dr Cordero), is medication and treatment compliant, does not have acces to substances, has not been violent/suicidal/aggressive, who was BIBE today after Patient reported to staff that he is hearing voices.     EXAM: calm, cooperative, polite, engages. Patient reports the same chronic auditory hallucinations (single male voice, commenting in general such as "look at things in life), not sure if ever commanding in a negative manner. intermittent/episodic, worst when Pt is stressed/not well/has insomnia; refocusing / talking to others lessens to voice and drowns in out. Patient also reports that he felt "confused" which on direct questioing sounded like small lapses in concentration, memory that responds to redirection. Patient is able to nicely verbalize his feelings/triggers at this time which is his long standing wish/hope to have his children come back into his life, be involved and he wants to see them again. Patient says that he is very lonely, spends the days inside the residence watching TV, trying to find people to "converse with" and he thinks he needs to be in therapy and talk to someone about all this. He is also interested in attending outside activities, socializing and very much interested in attending a day program/senior center. Patient is not sure if his  Sally is aware of his interests. Patient otherwise reports pretty good sleep, varying appetite but overall adequate, low mood when he thinks about his children but otherwise stable, and denies suicidal ideation, intent or plan. Pt also would like to return to a [employer] and resume practicing his Baptist phyllis as he found much solace in it. Patient reports medication compliance and reports daytime somnolence, feeling fatigue/sluggish consistent with medication  side effects. He does not want to return to his residence tonight due to the voice talking to him. (indicates return in AM).     COLLATERAL from staff member GUILLERMO Hope [phone] from [facility]: Patient has been his usual self which includes having chronic auditory hallucinations which Pt feels comfortable disclosing to staff, Patient gets his medications dispensed to him and has been compliant. His current meds are: Lexapro 20mg PO qd, Norvasc 2.5mg , lisinopril 10mg po qd, Prolixin 2.5mg bid, Zyprexa 20mg PO bid, asthma inhaler, nasal spray, trazodone 50mg PO qhs standing. Sees Dr Cordero consulting psychiatrist (private) who sees patients at the residence. Patient has been asking to be put in therapy and to go to a dayprogram as he is bored. GUILLERMO Hope agrees with this and feels like Patient would most benefit from socialization, getting out of the residence. No acute safety concerns. No SI/HI. Can return back.     COLLATERAL from staff at [facility] [phone]: / answered and looked at the log of who comes/goes. Patient NO LONGER a resident there. He left 11/11/2021  COLLATERAL FROM Pt's outpatient [facility], [facility] office ([phone]):     PSYCKES: Schizophrenia | Schizoaffective Disorder | Tobacco related disorder | Major Depressive Disorder | Unspecified/Other Anxiety Disorder | Brief Psychotic Disorder (ICD10 Only) | Dementia (Neurocognitive) | Alcohol related disorders    ISTOP Reference #: [id number] no record of Patient  CVM: no record of Patient 79yo AAM, single,  > 20 yrs ago, father of 5 children (not involved, last saw daughter Fuentes ~ 1.5 yrs ago in person), not in touch with his ex-wife, noncaregiver, domiciled at [facility] located in [facility], close with his roommate, has a good friend Jonathon at [facility], has a brother named Cj (lives in [place]), identifies as Jehovah's witness, +  (army medic/) and linked with [place]'s Affairs services, has a ICM/Community  (Sally) from [facility] since 4/18/2023, with charted hx of Schizophrenia, PSYCKES also showing MDD, Dementia, Alcohol Related Disorders), [facility], hospitalized at [facility] a few months ago due to similar symptoms, [facility] admission 10/19/2012-4/23/2013, currently attending outpatient services at the [facility], [facility] office (sees Dr Leighton Cordero), is medication and treatment compliant, does not have acces to substances, has not been violent/suicidal/aggressive, who was BIBE today after Patient reported to staff that he is hearing voices.     EXAM: calm, cooperative, polite, engages. Patient reports the same chronic auditory hallucinations (single male voice, commenting in general such as "look at things in life), not sure if ever commanding in a negative manner. intermittent/episodic, worst when Pt is stressed/not well/has insomnia; refocusing / talking to others lessens to voice and drowns in out. Patient also reports that he felt "confused" which on direct questioing sounded like small lapses in concentration, memory that responds to redirection. Patient is able to nicely verbalize his feelings/triggers at this time which is his long standing wish/hope to have his children come back into his life, be involved and he wants to see them again. Patient says that he is very lonely, spends the days inside the residence watching TV, trying to find people to "converse with" and he thinks he needs to be in therapy and talk to someone about all this. Patient even called a Crisis Hotline last night so he can talk to someone but did not get any "therapy" but just general conversation. He is also interested in attending outside activities, socializing and very much interested in attending a day program/senior center. Patient is not sure if his  Sally is aware of his interests. Patient otherwise reports pretty good sleep, varying appetite but overall adequate, low mood when he thinks about his children but otherwise stable, and denies suicidal ideation, intent or plan. Pt also would like to return to a Mormonism and resume practicing his Jehovah's witness phyllis as he found much solace in it. Patient reports medication compliance and reports daytime somnolence, feeling fatigue/sluggish consistent with medication  side effects. He does not want to return to his residence tonight due to the voice talking to him. (indicates return in AM).     COLLATERAL from staff member GUILLERMO Hope [phone] from [facility] Residence: Patient has been his usual self which includes having chronic auditory hallucinations which Pt feels comfortable disclosing to staff, Patient gets his medications dispensed to him and has been compliant. His current meds are: Lexapro 20mg PO qd, Norvasc 2.5mg , lisinopril 10mg po qd, Prolixin 2.5mg bid, Zyprexa 20mg PO bid, asthma inhaler, nasal spray, trazodone 50mg PO qhs standing. Sees Dr Cordero consulting psychiatrist (private) who sees patients at the residence. Patient has been asking to be put in therapy and to go to a dayprogram as he is bored. GUILLERMO Hope agrees with this and feels like Patient would most benefit from socialization, getting out of the residence. No acute safety concerns. No SI/HI. Can return back.     COLLATERAL from staff at [facility] [phone]: / answered and looked at the log of who comes/goes. Patient NO LONGER a resident there. He left 11/11/2021  COLLATERAL FROM Pt's outpatient [facility] office ([phone]):     PSYCKES: Schizophrenia | Schizoaffective Disorder | Tobacco related disorder | Major Depressive Disorder | Unspecified/Other Anxiety Disorder | Brief Psychotic Disorder (ICD10 Only) | Dementia (Neurocognitive) | Alcohol related disorders  - Supported Housing Community Services, [facility]. Transitional Services for [facility]. contact Vicki Tipton: [phone]  - [facility] ED stay x 1 day 2/13/23, 12/28/22; [place] CPEP 7/24/23 & 2/1/2023 ; 1 day ER stints at [facility], [facility], [facility]   - Internist Dr Filemon Alexander, psychiatrist Dr Leighton Jiménze     ISTOP Reference #: [id number] no record of Patient  CVM: no record of Patient

## 2024-03-03 NOTE — ED ADULT NURSE NOTE - CHIEF COMPLAINT QUOTE
[facility] EMS states " Pt hallucinating hearing voices for the past three months. Today it is worse. Denies SI/HI". 1:1 initiated

## 2024-03-03 NOTE — ED BEHAVIORAL HEALTH ASSESSMENT NOTE - OTHER
[facility] staff assisted living roommate "Sad about my kids" self reported hx of lapses but gives reliable history appropriate serious, solemn perioral movements (mild) consistent with TD on the blunted side staff deferred higher end of fair unable to ascertain via camera [facility], [facility].; Located in: [facility], [address] [place] [address] [facility], [facility].; located in: [facility], [address] [place] [address]

## 2024-03-03 NOTE — ED PROVIDER NOTE - CLINICAL SUMMARY MEDICAL DECISION MAKING FREE TEXT BOX
Pt with psych disorder Pt with psych disorder    78-year-old male, history of schizophrenia presents to the ED with paranoia and auditory hallucinations.  No SI/HI.  Medically cleared in ED.  Seen by telepsych and cleared for discharge.

## 2024-03-03 NOTE — ED BEHAVIORAL HEALTH ASSESSMENT NOTE - CURRENT MEDICATION
claritin 10 mg daily, lexapro 20 mg daily, prinivil 10 mg daily, zyprexa 10 mg daily / 20 mg at 5pm, propranolol 10 mg BID, trazodone 50 mg qhs, nicorette tablets PRN, albuterol inhaler PRN, vitamin D2 weekly, vitamin D3 BID, fluticasone/salmeterol inhaler BID, latanprost eye drops, lisinopril 40 mg daily Lexapro 20mg PO qd, Norvasc 2.5mg , lisinopril 10mg po qd, Prolixin 2.5mg bid, Zyprexa 20mg PO bid, asthma inhaler, nasal spray, trazodone 50mg PO qhs standing

## 2024-03-03 NOTE — ED PROVIDER NOTE - PHYSICAL EXAMINATION
CONSTITUTIONAL: non-toxic appearing male, nad  SKIN: skin exam is warm and dry  HEAD: Normocephalic; atraumatic  EYES: PERRL, EOM intact; conjunctiva and sclera clear.  ENT: MMM  NECK: ROM intact  CARD: S1, S2 normal, no murmur  RESP: scant expiratory wheezing, speaking full sentences, no accessory muscle use   ABD: soft; non-distended; non-tender.   EXT: Normal ROM.   NEURO: awake, alert, following commands, oriented, grossly unremarkable. No Focal deficits. GCS 15.   PSYCH: Cooperative, appropriate.

## 2024-03-03 NOTE — ED BEHAVIORAL HEALTH ASSESSMENT NOTE - MEDICATIONS (PRESCRIPTIONS, DIRECTIONS)
Pt is overmedicated / reports daytime sedation: Zyprexa 40mg qd is above FDA recommendations for this age group - would reduce especially that Pt is on a 2nd antipsychotic. Prolixin holds hallucinations better then Zyprexa - would increase Prolixin and reduce the Zyprexa (monotherapy would be most ideal). Zyprexa also sedating and Pt is getting standing trazodone which is also very sedating hence somnolence. Also inducing sluggish/low energy state and compounding mood.

## 2024-03-03 NOTE — ED BEHAVIORAL HEALTH ASSESSMENT NOTE - PAST PSYCHOTROPIC MEDICATION
klonopin, fluphenazine (PO and decanoate), haldol (PO and decanoate), risperdal, wellbutrin, cogentin klonopin, fluphenazine (PO and decanoate), haldol (PO and decanoate), risperdal, wellbutrin, cogentin; claritin 10 mg daily, lexapro 20 mg daily, prinivil 10 mg daily, zyprexa 10 mg daily / 20 mg at 5pm, propranolol 10 mg BID, trazodone 50 mg qhs, nicorette tablets PRN, albuterol inhaler PRN, vitamin D2 weekly, vitamin D3 BID, fluticasone/salmeterol inhaler BID, latanprost eye drops, lisinopril 40 mg daily klonopin, fluphenazine (PO and decanoate), haldol (PO and decanoate), risperdal, wellbutrin, cogentin; lexapro 20 mg daily, zyprexa 10 mg daily / 20 mg at 5pm, razodone 50 mg qhs

## 2024-03-03 NOTE — ED PROVIDER NOTE - NSFOLLOWUPINSTRUCTIONS_ED_ALL_ED_FT
Schizophrenia    Schizophrenia is a mental illness. It may cause disturbed or disorganized thinking, speech, or behavior. People with schizophrenia have problems functioning in one or more areas of life: work, school, home, or relationships. People with schizophrenia are at increased risk for suicide, certain chronic physical illnesses, and unhealthy behaviors, such as smoking and drug use.    People who have family members with schizophrenia are at higher risk of developing the illness. Schizophrenia affects men and women equally but usually appears at an earlier age (teenage or early adult years) in men.     SYMPTOMS  The earliest symptoms are often subtle (prodrome) and may go unnoticed until the illness becomes more severe (first-break psychosis). Symptoms of schizophrenia may be continuous or may come and go in severity. Episodes often are triggered by major life events, such as family stress, college,  service, marriage, pregnancy or child birth, divorce, or loss of a loved one. People with schizophrenia may see, hear, or feel things that do not exist (hallucinations). They may have false beliefs in spite of obvious proof to the contrary (delusions). Sometimes speech is incoherent or behavior is odd or withdrawn.     DIAGNOSIS  Schizophrenia is diagnosed through an assessment by your caregiver. Your caregiver will ask questions about your thoughts, behavior, mood, and ability to function in daily life. Your caregiver may ask questions about your medical history and use of alcohol or drugs, including prescription medication. Your caregiver may also order blood tests and imaging exams. Certain medical conditions and substances can cause symptoms that resemble schizophrenia. Your caregiver may refer you to a mental health specialist for evaluation. There are three major criterion for a diagnosis of schizophrenia:    Two or more of the following five symptoms are present for a month or longer:   Delusions. Often the delusions are that you are being attacked, harassed, cheated, persecuted or conspired against (persecutory delusions).  Hallucinations.    Disorganized speech that does not make sense to others.  Grossly disorganized (confused or unfocused) behavior or extremely overactive or underactive motor activity (catatonia).  Negative symptoms such as bland or blunted emotions (flat affect), loss of will power (avolition), and withdrawal from social contacts (social isolation).  Level of functioning in one or more major areas of life (work, school, relationships, or self-care) is markedly below the level of functioning before the onset of illness.    There are continuous signs of illness (either mild symptoms or decreased level of functioning) for at least 6 months or longer.    TREATMENT  Schizophrenia is a long-term illness. It is best controlled with continuous treatment rather than treatment only when symptoms occur. The following treatments are used to manage schizophrenia:     Medication—Medication is the most effective and important form of treatment for schizophrenia. Antipsychotic medications are usually prescribed to help manage schizophrenia. Other types of medication may be added to relieve any symptoms that may occur despite the use of antipsychotic medications.   Counseling or talk therapy—Individual, group, or family counseling may be helpful in providing education, support, and guidance. Many people with schizophrenia also benefit from social skills and job skills (vocational) training.    A combination of medication and counseling is best for managing the disorder over time. A procedure in which electricity is applied to the brain through the scalp (electroconvulsive therapy) may be used to treat catatonic schizophrenia or schizophrenia in people who cannot take or do not respond to medication and counseling.

## 2024-03-03 NOTE — ED PROVIDER NOTE - OBJECTIVE STATEMENT
78 year old male, past medical history schizophrenia, +smoker, who presents with auditory hallucinations. patient reports "I am oppressed by the devil." admits to auditory hallucinations. patient also reports wheezing. denies f/c, chest pain, hemoptysis, abd pain, n/v/d. denies drug/alcohol use or visual hallucinations.

## 2024-03-03 NOTE — ED BEHAVIORAL HEALTH ASSESSMENT NOTE - COMMENTS ON VIOLENCE RISK/PROTECTIVE FACTORS:
lives in supervised residence where basic needs are met, medications are administered / no access to substances, weapons

## 2024-03-03 NOTE — ED PROVIDER NOTE - ATTENDING APP SHARED VISIT CONTRIBUTION OF CARE
Pt reports hallucinations. Hearing voices. Voices are speaking about satan. Not suicidal or homicidal. S1S2 rrr, no murmur, lungs clear.

## 2024-03-03 NOTE — ED BEHAVIORAL HEALTH NOTE - BEHAVIORAL HEALTH NOTE
PLEASE PRINT THIS PAGE OUT AND SEND BACK TO STAFF AT PATIENT'S RESIDENCE WITH RECOMMENDATIONS:     1) medications  - Pt is overmedicated / reports daytime sedation: Zyprexa 40mg qd is above FDA recommendations for this age group - would reduce especially that Pt is on a 2nd antipsychotic.   - Prolixin holds hallucinations better then Zyprexa - would increase Prolixin and reduce the Zyprexa (monotherapy would be most ideal).   - Zyprexa also sedating and Pt is getting standing trazodone which is also very sedating hence somnolence. This also creates a sluggish/low energy state and compounds mood.    2) please contact  ICM[facility]Community  (Sally) from [facility] as ask patient to be linked with outpatient services  - Pt would like individual therapy (reports being very lonely, wants his children to reconnect with him)     3) local senior center and/or day program ([facility])   - Patient is under stimulated, bored and is watching TV all day. He craves socialization, to meet new people, strike up conversations; misses his friends from his prior residence    4) local [employer] attendance  - Patient would like to actively practice his Taoism. Local Mu-ism organizations often have social activities, even sponsored get togethers/transporation.   - would contact [facility];  [address], [place] [address], phone [phone]; [email or web address]

## 2024-03-03 NOTE — ED BEHAVIORAL HEALTH ASSESSMENT NOTE - REFERRAL / APPOINTMENT DETAILS
regularly sees psychiatrist Dr Cordero at the residence; has  Sally who sees Pt every 2 weeks - should be able to find a day program for Patient and

## 2024-03-03 NOTE — ED PROVIDER NOTE - PATIENT PORTAL LINK FT
You can access the FollowMyHealth Patient Portal offered by [facility] by registering at the following website: [email or web address]. By joining [employer]’s FollowMyHealth portal, you will also be able to view your health information using other applications (apps) compatible with our system.

## 2024-03-03 NOTE — ED BEHAVIORAL HEALTH ASSESSMENT NOTE - DESCRIPTION
asthma glaucoma, HTN asthma glaucoma, HTN; incomplete RBBB on EKG was domiciled at the [facility] assisted living facility until 11/11/21 calm, cooperative, polite

## 2024-03-03 NOTE — ED ADULT TRIAGE NOTE - CHIEF COMPLAINT QUOTE
[facility] states " Pt hallucinating hearing voices for the past three months. Today it is worse. Denies SI/HI" [facility] states " Pt hallucinating hearing voices for the past three months. Today it is worse. Denies SI/HI". 1:1 initiated

## 2024-03-03 NOTE — ED BEHAVIORAL HEALTH ASSESSMENT NOTE - NSBHROSSTATEMENT_PSY_A_CORE
. This is a 74 year old  male with PMH of Hypertension, Diabetes on Janumet-last HgA1C unknown, diabetic neuropathy, adrenal hypofunction on Hydrocortisone 20 mg daily-last Sodium level 131, chloride 96, Potassium 3.5 on 6/23/2023-Na level increased from 128 of 6/2/2023, constipation,  lumbar herniated discs with radiculopathy, seasonal allergies, presents with unilateral right inguinal pain hernia without obstruction or gangrene and umbilical hernia without obstruction or gangrene. Pt is scheduled for right inguinal hernia repair with mesh and umbilical hernia repair on 6/28/2023.   TATYANA stop bang score 4. Pt denies history of TATYANA, never did sleep study.

## 2024-03-03 NOTE — ED PROVIDER NOTE - NSFOLLOWUPCLINICS_GEN_ALL_ED_FT
[facility]  [facility]  [address] [place] [id number]  Phone: [phone]  Fax:   Follow Up Time: 4-6 Days

## 2024-03-03 NOTE — ED BEHAVIORAL HEALTH ASSESSMENT NOTE - SUMMARY
Chronic schizophrenia with psychosocial stressors (missing his children, no family involvement) causing sadness in addition to understimulating environment with Patient actually verbalizing his need/wish to get enrolled in therapy, attend a day program and/or senior center in addition to attending a local [employer] so he can practice his Jehovah's witness again. Moreover, current psychiatric regimen can be tweaked to minimize daytime sedation and to geriatric dose in light of Pt's age.

## 2024-03-03 NOTE — ED BEHAVIORAL HEALTH ASSESSMENT NOTE - RISK ASSESSMENT
Chronic risk factors: age > 65 yrs, long hx of psychosis, remote hx of substance use, no family involvement, , male gender. Protective factors: medication and treatment compliant; no known suicide attempts; no self-injurious behavior; no hx of aggression/violence; no legal issues; motivated for help; able to verbalize needs, lives in supervised residence; has close friends, access to health services. Acute risk factors identified: needs interactive theraputic milieu

## 2025-06-17 ENCOUNTER — INPATIENT (INPATIENT)
Facility: HOSPITAL | Age: 80
LOS: 13 days | Discharge: HOME | DRG: 133 | End: 2025-07-01
Attending: INTERNAL MEDICINE | Admitting: INTERNAL MEDICINE
Payer: MEDICAID

## 2025-06-17 VITALS
OXYGEN SATURATION: 98 % | HEART RATE: 114 BPM | SYSTOLIC BLOOD PRESSURE: 200 MMHG | TEMPERATURE: 98 F | RESPIRATION RATE: 18 BRPM | DIASTOLIC BLOOD PRESSURE: 97 MMHG

## 2025-06-17 DIAGNOSIS — J44.1 CHRONIC OBSTRUCTIVE PULMONARY DISEASE WITH (ACUTE) EXACERBATION: ICD-10-CM

## 2025-06-17 LAB
ALBUMIN SERPL ELPH-MCNC: 4.2 G/DL — SIGNIFICANT CHANGE UP (ref 3.5–5.2)
ALP SERPL-CCNC: 90 U/L — SIGNIFICANT CHANGE UP (ref 30–115)
ALT FLD-CCNC: 16 U/L — SIGNIFICANT CHANGE UP (ref 0–41)
ANION GAP SERPL CALC-SCNC: 16 MMOL/L — HIGH (ref 7–14)
APPEARANCE UR: CLEAR — SIGNIFICANT CHANGE UP
AST SERPL-CCNC: 21 U/L — SIGNIFICANT CHANGE UP (ref 0–41)
BASE EXCESS BLDV CALC-SCNC: -2.8 MMOL/L — LOW (ref -2–3)
BASE EXCESS BLDV CALC-SCNC: -3.5 MMOL/L — LOW (ref -2–3)
BASOPHILS # BLD AUTO: 0.02 K/UL — SIGNIFICANT CHANGE UP (ref 0–0.2)
BASOPHILS NFR BLD AUTO: 0.3 % — SIGNIFICANT CHANGE UP (ref 0–1)
BILIRUB SERPL-MCNC: 0.3 MG/DL — SIGNIFICANT CHANGE UP (ref 0.2–1.2)
BILIRUB UR-MCNC: NEGATIVE — SIGNIFICANT CHANGE UP
BUN SERPL-MCNC: 16 MG/DL — SIGNIFICANT CHANGE UP (ref 10–20)
CA-I SERPL-SCNC: 1.15 MMOL/L — SIGNIFICANT CHANGE UP (ref 1.15–1.33)
CALCIUM SERPL-MCNC: 9.3 MG/DL — SIGNIFICANT CHANGE UP (ref 8.4–10.5)
CHLORIDE SERPL-SCNC: 104 MMOL/L — SIGNIFICANT CHANGE UP (ref 98–110)
CO2 SERPL-SCNC: 18 MMOL/L — SIGNIFICANT CHANGE UP (ref 17–32)
COLOR SPEC: YELLOW — SIGNIFICANT CHANGE UP
CREAT SERPL-MCNC: 2.1 MG/DL — HIGH (ref 0.7–1.5)
DIFF PNL FLD: NEGATIVE — SIGNIFICANT CHANGE UP
EGFR: 32 ML/MIN/1.73M2 — LOW
EGFR: 32 ML/MIN/1.73M2 — LOW
EOSINOPHIL # BLD AUTO: 0.08 K/UL — SIGNIFICANT CHANGE UP (ref 0–0.7)
EOSINOPHIL NFR BLD AUTO: 1.3 % — SIGNIFICANT CHANGE UP (ref 0–8)
GAS PNL BLDV: 134 MMOL/L — LOW (ref 136–145)
GAS PNL BLDV: SIGNIFICANT CHANGE UP
GAS PNL BLDV: SIGNIFICANT CHANGE UP
GLUCOSE SERPL-MCNC: 147 MG/DL — HIGH (ref 70–99)
GLUCOSE UR QL: NEGATIVE MG/DL — SIGNIFICANT CHANGE UP
HCO3 BLDV-SCNC: 22 MMOL/L — SIGNIFICANT CHANGE UP (ref 22–29)
HCO3 BLDV-SCNC: 22 MMOL/L — SIGNIFICANT CHANGE UP (ref 22–29)
HCT VFR BLD CALC: 46.6 % — SIGNIFICANT CHANGE UP (ref 42–52)
HCT VFR BLDA CALC: 45 % — SIGNIFICANT CHANGE UP (ref 39–51)
HGB BLD CALC-MCNC: 14.9 G/DL — SIGNIFICANT CHANGE UP (ref 12.6–17.4)
HGB BLD-MCNC: 15.1 G/DL — SIGNIFICANT CHANGE UP (ref 14–18)
IMM GRANULOCYTES NFR BLD AUTO: 0.3 % — SIGNIFICANT CHANGE UP (ref 0.1–0.3)
KETONES UR QL: NEGATIVE MG/DL — SIGNIFICANT CHANGE UP
LACTATE BLDV-MCNC: 2.7 MMOL/L — HIGH (ref 0.5–2)
LACTATE BLDV-MCNC: 3.3 MMOL/L — HIGH (ref 0.5–2)
LACTATE SERPL-SCNC: 4.2 MMOL/L — CRITICAL HIGH (ref 0.7–2)
LEUKOCYTE ESTERASE UR-ACNC: NEGATIVE — SIGNIFICANT CHANGE UP
LIDOCAIN IGE QN: 28 U/L — SIGNIFICANT CHANGE UP (ref 7–60)
LYMPHOCYTES # BLD AUTO: 0.94 K/UL — LOW (ref 1.2–3.4)
LYMPHOCYTES # BLD AUTO: 15.5 % — LOW (ref 20.5–51.1)
MCHC RBC-ENTMCNC: 29.4 PG — SIGNIFICANT CHANGE UP (ref 27–31)
MCHC RBC-ENTMCNC: 32.4 G/DL — SIGNIFICANT CHANGE UP (ref 32–37)
MCV RBC AUTO: 90.7 FL — SIGNIFICANT CHANGE UP (ref 80–94)
MONOCYTES # BLD AUTO: 0.49 K/UL — SIGNIFICANT CHANGE UP (ref 0.1–0.6)
MONOCYTES NFR BLD AUTO: 8.1 % — SIGNIFICANT CHANGE UP (ref 1.7–9.3)
NEUTROPHILS # BLD AUTO: 4.52 K/UL — SIGNIFICANT CHANGE UP (ref 1.4–6.5)
NEUTROPHILS NFR BLD AUTO: 74.5 % — SIGNIFICANT CHANGE UP (ref 42.2–75.2)
NITRITE UR-MCNC: NEGATIVE — SIGNIFICANT CHANGE UP
NRBC BLD AUTO-RTO: 0 /100 WBCS — SIGNIFICANT CHANGE UP (ref 0–0)
NT-PROBNP SERPL-SCNC: <36 PG/ML — SIGNIFICANT CHANGE UP (ref 0–300)
PCO2 BLDV: 37 MMHG — LOW (ref 42–55)
PCO2 BLDV: 38 MMHG — LOW (ref 42–55)
PH BLDV: 7.36 — SIGNIFICANT CHANGE UP (ref 7.32–7.43)
PH BLDV: 7.38 — SIGNIFICANT CHANGE UP (ref 7.32–7.43)
PH UR: 6 — SIGNIFICANT CHANGE UP (ref 5–8)
PLATELET # BLD AUTO: 199 K/UL — SIGNIFICANT CHANGE UP (ref 130–400)
PMV BLD: 10.9 FL — HIGH (ref 7.4–10.4)
PO2 BLDV: 57 MMHG — HIGH (ref 25–45)
PO2 BLDV: 61 MMHG — HIGH (ref 25–45)
POTASSIUM BLDV-SCNC: 4.5 MMOL/L — SIGNIFICANT CHANGE UP (ref 3.5–5.1)
POTASSIUM SERPL-MCNC: 4.9 MMOL/L — SIGNIFICANT CHANGE UP (ref 3.5–5)
POTASSIUM SERPL-SCNC: 4.9 MMOL/L — SIGNIFICANT CHANGE UP (ref 3.5–5)
PROT SERPL-MCNC: 8 G/DL — SIGNIFICANT CHANGE UP (ref 6–8)
PROT UR-MCNC: SIGNIFICANT CHANGE UP MG/DL
RBC # BLD: 5.14 M/UL — SIGNIFICANT CHANGE UP (ref 4.7–6.1)
RBC # FLD: 13.6 % — SIGNIFICANT CHANGE UP (ref 11.5–14.5)
SAO2 % BLDV: 89.5 % — HIGH (ref 67–88)
SAO2 % BLDV: 91.9 % — HIGH (ref 67–88)
SODIUM SERPL-SCNC: 138 MMOL/L — SIGNIFICANT CHANGE UP (ref 135–146)
SP GR SPEC: 1.02 — SIGNIFICANT CHANGE UP (ref 1–1.03)
TROPONIN T, HIGH SENSITIVITY RESULT: 11 NG/L — SIGNIFICANT CHANGE UP (ref 6–21)
TROPONIN T, HIGH SENSITIVITY RESULT: 9 NG/L — SIGNIFICANT CHANGE UP (ref 6–21)
UROBILINOGEN FLD QL: 0.2 MG/DL — SIGNIFICANT CHANGE UP (ref 0.2–1)
WBC # BLD: 6.07 K/UL — SIGNIFICANT CHANGE UP (ref 4.8–10.8)
WBC # FLD AUTO: 6.07 K/UL — SIGNIFICANT CHANGE UP (ref 4.8–10.8)

## 2025-06-17 PROCEDURE — 71045 X-RAY EXAM CHEST 1 VIEW: CPT | Mod: 26

## 2025-06-17 PROCEDURE — 76770 US EXAM ABDO BACK WALL COMP: CPT

## 2025-06-17 PROCEDURE — 99285 EMERGENCY DEPT VISIT HI MDM: CPT

## 2025-06-17 PROCEDURE — 80202 ASSAY OF VANCOMYCIN: CPT

## 2025-06-17 PROCEDURE — 97162 PT EVAL MOD COMPLEX 30 MIN: CPT | Mod: GP

## 2025-06-17 PROCEDURE — 86900 BLOOD TYPING SEROLOGIC ABO: CPT

## 2025-06-17 PROCEDURE — 71250 CT THORAX DX C-: CPT

## 2025-06-17 PROCEDURE — 85027 COMPLETE CBC AUTOMATED: CPT

## 2025-06-17 PROCEDURE — 84132 ASSAY OF SERUM POTASSIUM: CPT

## 2025-06-17 PROCEDURE — 84100 ASSAY OF PHOSPHORUS: CPT

## 2025-06-17 PROCEDURE — 84145 PROCALCITONIN (PCT): CPT

## 2025-06-17 PROCEDURE — 93970 EXTREMITY STUDY: CPT

## 2025-06-17 PROCEDURE — 92610 EVALUATE SWALLOWING FUNCTION: CPT | Mod: GN

## 2025-06-17 PROCEDURE — 74177 CT ABD & PELVIS W/CONTRAST: CPT | Mod: 26

## 2025-06-17 PROCEDURE — 82550 ASSAY OF CK (CPK): CPT

## 2025-06-17 PROCEDURE — 92526 ORAL FUNCTION THERAPY: CPT | Mod: GN

## 2025-06-17 PROCEDURE — 86850 RBC ANTIBODY SCREEN: CPT

## 2025-06-17 PROCEDURE — 85730 THROMBOPLASTIN TIME PARTIAL: CPT

## 2025-06-17 PROCEDURE — 87150 DNA/RNA AMPLIFIED PROBE: CPT

## 2025-06-17 PROCEDURE — 93010 ELECTROCARDIOGRAM REPORT: CPT

## 2025-06-17 PROCEDURE — 99223 1ST HOSP IP/OBS HIGH 75: CPT

## 2025-06-17 PROCEDURE — 86901 BLOOD TYPING SEROLOGIC RH(D): CPT

## 2025-06-17 PROCEDURE — 83036 HEMOGLOBIN GLYCOSYLATED A1C: CPT

## 2025-06-17 PROCEDURE — 87641 MR-STAPH DNA AMP PROBE: CPT

## 2025-06-17 PROCEDURE — 85025 COMPLETE CBC W/AUTO DIFF WBC: CPT

## 2025-06-17 PROCEDURE — 94640 AIRWAY INHALATION TREATMENT: CPT

## 2025-06-17 PROCEDURE — 94760 N-INVAS EAR/PLS OXIMETRY 1: CPT

## 2025-06-17 PROCEDURE — 87640 STAPH A DNA AMP PROBE: CPT

## 2025-06-17 PROCEDURE — 84484 ASSAY OF TROPONIN QUANT: CPT

## 2025-06-17 PROCEDURE — 80053 COMPREHEN METABOLIC PANEL: CPT

## 2025-06-17 PROCEDURE — 71045 X-RAY EXAM CHEST 1 VIEW: CPT

## 2025-06-17 PROCEDURE — 82150 ASSAY OF AMYLASE: CPT

## 2025-06-17 PROCEDURE — 83690 ASSAY OF LIPASE: CPT

## 2025-06-17 PROCEDURE — 74176 CT ABD & PELVIS W/O CONTRAST: CPT

## 2025-06-17 PROCEDURE — 74018 RADEX ABDOMEN 1 VIEW: CPT

## 2025-06-17 PROCEDURE — 87040 BLOOD CULTURE FOR BACTERIA: CPT

## 2025-06-17 PROCEDURE — 80048 BASIC METABOLIC PNL TOTAL CA: CPT

## 2025-06-17 PROCEDURE — 36415 COLL VENOUS BLD VENIPUNCTURE: CPT

## 2025-06-17 PROCEDURE — 85014 HEMATOCRIT: CPT

## 2025-06-17 PROCEDURE — 82330 ASSAY OF CALCIUM: CPT

## 2025-06-17 PROCEDURE — 94660 CPAP INITIATION&MGMT: CPT

## 2025-06-17 PROCEDURE — 83605 ASSAY OF LACTIC ACID: CPT

## 2025-06-17 PROCEDURE — 83735 ASSAY OF MAGNESIUM: CPT

## 2025-06-17 PROCEDURE — 93306 TTE W/DOPPLER COMPLETE: CPT

## 2025-06-17 PROCEDURE — 82803 BLOOD GASES ANY COMBINATION: CPT

## 2025-06-17 PROCEDURE — 74019 RADEX ABDOMEN 2 VIEWS: CPT

## 2025-06-17 PROCEDURE — 85018 HEMOGLOBIN: CPT

## 2025-06-17 PROCEDURE — 87077 CULTURE AEROBIC IDENTIFY: CPT

## 2025-06-17 PROCEDURE — 92612 ENDOSCOPY SWALLOW (FEES) VID: CPT | Mod: GN

## 2025-06-17 PROCEDURE — 85610 PROTHROMBIN TIME: CPT

## 2025-06-17 PROCEDURE — 0241U: CPT

## 2025-06-17 PROCEDURE — 84295 ASSAY OF SERUM SODIUM: CPT

## 2025-06-17 PROCEDURE — 82962 GLUCOSE BLOOD TEST: CPT

## 2025-06-17 PROCEDURE — 87186 SC STD MICRODIL/AGAR DIL: CPT

## 2025-06-17 PROCEDURE — 93005 ELECTROCARDIOGRAM TRACING: CPT

## 2025-06-17 PROCEDURE — 71275 CT ANGIOGRAPHY CHEST: CPT | Mod: 26

## 2025-06-17 PROCEDURE — 80061 LIPID PANEL: CPT

## 2025-06-17 PROCEDURE — 84443 ASSAY THYROID STIM HORMONE: CPT

## 2025-06-17 RX ORDER — OMEPRAZOLE 20 MG/1
1 CAPSULE, DELAYED RELEASE ORAL
Refills: 0 | DISCHARGE

## 2025-06-17 RX ORDER — IPRATROPIUM BROMIDE AND ALBUTEROL SULFATE .5; 2.5 MG/3ML; MG/3ML
3 SOLUTION RESPIRATORY (INHALATION)
Refills: 0 | Status: DISCONTINUED | OUTPATIENT
Start: 2025-06-17 | End: 2025-06-18

## 2025-06-17 RX ORDER — IPRATROPIUM BROMIDE AND ALBUTEROL SULFATE .5; 2.5 MG/3ML; MG/3ML
3 SOLUTION RESPIRATORY (INHALATION) EVERY 6 HOURS
Refills: 0 | Status: DISCONTINUED | OUTPATIENT
Start: 2025-06-17 | End: 2025-06-20

## 2025-06-17 RX ORDER — DEXAMETHASONE 0.5 MG/1
10 TABLET ORAL ONCE
Refills: 0 | Status: COMPLETED | OUTPATIENT
Start: 2025-06-17 | End: 2025-06-17

## 2025-06-17 RX ORDER — LORATADINE 5 MG/5ML
1 SOLUTION ORAL
Refills: 0 | DISCHARGE

## 2025-06-17 RX ORDER — AZITHROMYCIN 250 MG
500 CAPSULE ORAL EVERY 24 HOURS
Refills: 0 | Status: DISCONTINUED | OUTPATIENT
Start: 2025-06-18 | End: 2025-06-19

## 2025-06-17 RX ORDER — MAGNESIUM, ALUMINUM HYDROXIDE 200-200 MG
30 TABLET,CHEWABLE ORAL EVERY 4 HOURS
Refills: 0 | Status: DISCONTINUED | OUTPATIENT
Start: 2025-06-17 | End: 2025-06-18

## 2025-06-17 RX ORDER — OLANZAPINE 10 MG/1
20 TABLET ORAL AT BEDTIME
Refills: 0 | Status: DISCONTINUED | OUTPATIENT
Start: 2025-06-17 | End: 2025-07-01

## 2025-06-17 RX ORDER — FLUPHENAZINE HCL 10 MG
1 TABLET ORAL
Refills: 0 | DISCHARGE

## 2025-06-17 RX ORDER — AMLODIPINE BESYLATE 10 MG/1
5 TABLET ORAL DAILY
Refills: 0 | Status: DISCONTINUED | OUTPATIENT
Start: 2025-06-17 | End: 2025-06-18

## 2025-06-17 RX ORDER — MEMANTINE HYDROCHLORIDE 21 MG/1
5 CAPSULE, EXTENDED RELEASE ORAL AT BEDTIME
Refills: 0 | Status: DISCONTINUED | OUTPATIENT
Start: 2025-06-17 | End: 2025-07-01

## 2025-06-17 RX ORDER — AZITHROMYCIN 250 MG
500 CAPSULE ORAL ONCE
Refills: 0 | Status: COMPLETED | OUTPATIENT
Start: 2025-06-17 | End: 2025-06-17

## 2025-06-17 RX ORDER — FOLIC ACID 1 MG/1
1 TABLET ORAL DAILY
Refills: 0 | Status: DISCONTINUED | OUTPATIENT
Start: 2025-06-17 | End: 2025-06-21

## 2025-06-17 RX ORDER — ONDANSETRON HCL/PF 4 MG/2 ML
4 VIAL (ML) INJECTION EVERY 8 HOURS
Refills: 0 | Status: DISCONTINUED | OUTPATIENT
Start: 2025-06-17 | End: 2025-06-29

## 2025-06-17 RX ORDER — BUPROPION HYDROBROMIDE 522 MG/1
1 TABLET, EXTENDED RELEASE ORAL
Refills: 0 | DISCHARGE

## 2025-06-17 RX ORDER — ALBUTEROL SULFATE 2.5 MG/3ML
2 VIAL, NEBULIZER (ML) INHALATION
Refills: 0 | Status: DISCONTINUED | OUTPATIENT
Start: 2025-06-17 | End: 2025-07-01

## 2025-06-17 RX ORDER — IPRATROPIUM BROMIDE AND ALBUTEROL SULFATE .5; 2.5 MG/3ML; MG/3ML
3 SOLUTION RESPIRATORY (INHALATION) ONCE
Refills: 0 | Status: COMPLETED | OUTPATIENT
Start: 2025-06-17 | End: 2025-06-17

## 2025-06-17 RX ORDER — FOLIC ACID 1 MG/1
1 TABLET ORAL
Refills: 0 | DISCHARGE

## 2025-06-17 RX ORDER — MEMANTINE HYDROCHLORIDE 21 MG/1
1 CAPSULE, EXTENDED RELEASE ORAL
Refills: 0 | DISCHARGE

## 2025-06-17 RX ORDER — CEFTRIAXONE 500 MG/1
1000 INJECTION, POWDER, FOR SOLUTION INTRAMUSCULAR; INTRAVENOUS EVERY 24 HOURS
Refills: 0 | Status: DISCONTINUED | OUTPATIENT
Start: 2025-06-17 | End: 2025-06-17

## 2025-06-17 RX ORDER — BUPROPION HYDROBROMIDE 522 MG/1
150 TABLET, EXTENDED RELEASE ORAL DAILY
Refills: 0 | Status: DISCONTINUED | OUTPATIENT
Start: 2025-06-17 | End: 2025-07-01

## 2025-06-17 RX ORDER — AZITHROMYCIN 250 MG
CAPSULE ORAL
Refills: 0 | Status: DISCONTINUED | OUTPATIENT
Start: 2025-06-17 | End: 2025-06-19

## 2025-06-17 RX ORDER — HEPARIN SODIUM 1000 [USP'U]/ML
5000 INJECTION INTRAVENOUS; SUBCUTANEOUS EVERY 8 HOURS
Refills: 0 | Status: DISCONTINUED | OUTPATIENT
Start: 2025-06-17 | End: 2025-07-01

## 2025-06-17 RX ORDER — PREDNISONE 20 MG/1
40 TABLET ORAL DAILY
Refills: 0 | Status: DISCONTINUED | OUTPATIENT
Start: 2025-06-18 | End: 2025-06-19

## 2025-06-17 RX ORDER — ACETAMINOPHEN 500 MG/5ML
650 LIQUID (ML) ORAL EVERY 6 HOURS
Refills: 0 | Status: DISCONTINUED | OUTPATIENT
Start: 2025-06-17 | End: 2025-06-18

## 2025-06-17 RX ORDER — MELATONIN 5 MG
3 TABLET ORAL AT BEDTIME
Refills: 0 | Status: DISCONTINUED | OUTPATIENT
Start: 2025-06-17 | End: 2025-06-29

## 2025-06-17 RX ORDER — FLUPHENAZINE HCL 10 MG
10 TABLET ORAL DAILY
Refills: 0 | Status: DISCONTINUED | OUTPATIENT
Start: 2025-06-17 | End: 2025-07-01

## 2025-06-17 RX ORDER — OLANZAPINE 10 MG/1
1 TABLET ORAL
Refills: 0 | DISCHARGE

## 2025-06-17 RX ADMIN — OLANZAPINE 20 MILLIGRAM(S): 10 TABLET ORAL at 21:52

## 2025-06-17 RX ADMIN — Medication 25 MILLIGRAM(S): at 21:53

## 2025-06-17 RX ADMIN — HEPARIN SODIUM 5000 UNIT(S): 1000 INJECTION INTRAVENOUS; SUBCUTANEOUS at 21:49

## 2025-06-17 RX ADMIN — MEMANTINE HYDROCHLORIDE 5 MILLIGRAM(S): 21 CAPSULE, EXTENDED RELEASE ORAL at 21:48

## 2025-06-17 RX ADMIN — Medication 250 MILLIGRAM(S): at 20:23

## 2025-06-17 RX ADMIN — Medication 500 MILLILITER(S): at 12:30

## 2025-06-17 RX ADMIN — IPRATROPIUM BROMIDE AND ALBUTEROL SULFATE 3 MILLILITER(S): .5; 2.5 SOLUTION RESPIRATORY (INHALATION) at 16:06

## 2025-06-17 RX ADMIN — IPRATROPIUM BROMIDE AND ALBUTEROL SULFATE 3 MILLILITER(S): .5; 2.5 SOLUTION RESPIRATORY (INHALATION) at 20:23

## 2025-06-17 RX ADMIN — DEXAMETHASONE 102 MILLIGRAM(S): 0.5 TABLET ORAL at 10:00

## 2025-06-17 RX ADMIN — IPRATROPIUM BROMIDE AND ALBUTEROL SULFATE 3 MILLILITER(S): .5; 2.5 SOLUTION RESPIRATORY (INHALATION) at 10:05

## 2025-06-17 NOTE — ED PROVIDER NOTE - CLINICAL SUMMARY MEDICAL DECISION MAKING FREE TEXT BOX
Patient found to have KRYSTINA, COPD exacerbation, desat to 92% RA, improved to 99% on 2L NC, wheezing improved but still mildly present another neb ordered, fluids ordered as well, ACS ruled out, admit to medicine for further treatment, patient agreeable    Labs and EKG were ordered and reviewed.  Imaging was ordered and reviewed by me.  Appropriate medications for patient's presenting complaints were ordered and effects were reassessed.  Patient's records (prior hospital, ED visit, and/or nursing home notes if available) were reviewed.  Additional history was obtained from EMS, family, and/or PCP (where available).  Escalation to admission/observation was considered.  Patient requires inpatient hospitalization - monitored setting.

## 2025-06-17 NOTE — ED PROVIDER NOTE - CARE PLAN
1 Principal Discharge DX:	COPD exacerbation  Secondary Diagnosis:	Acute respiratory failure with hypoxia  Secondary Diagnosis:	KRYSTINA (acute kidney injury)   Nsaids Counseling: NSAID Counseling: I discussed with the patient that NSAIDs should be taken with food. Prolonged use of NSAIDs can result in the development of stomach ulcers.  Patient advised to stop taking NSAIDs if abdominal pain occurs.  The patient verbalized understanding of the proper use and possible adverse effects of NSAIDs.  All of the patient's questions and concerns were addressed.

## 2025-06-17 NOTE — PATIENT PROFILE ADULT - NSTRANSFERBELONGINGSRESP_GEN_A_NUR
You might feel 'gassy' or "crampy'' for a few hours. This is because air was put into the stomach during the procedure. However, if you have continued abdominal (stomach) pain or swelling, contact your doctor right away. yes

## 2025-06-17 NOTE — ED PROVIDER NOTE - OBJECTIVE STATEMENT
79-year-old male past medical history of schizophrenia presents to the ED for left lower quadrant abdominal pain associated shortness of breath and cough.  No palliative provoking factors.  Denies any fever, chest pain, nausea, vomiting, diarrhea, dysuria, hemoptysis, palpitations, lightheadedness.

## 2025-06-17 NOTE — H&P ADULT - ATTENDING COMMENTS
#URI - likely viral in patient w undiagnosed copd/emphysema - no sepsis poa  - CT w biapical pleural thickening w paraseptal emphysematous changes; bibasilar atelectasis; areas of groundglass attenuation which could reflect air trapping/airway disease  - cont complete course of azithromycin, more for antiinflammatory properties along w short course of prednisone  - otherwise, nebs and inhalers, along w antitussives and decongestants prn  - outpatient pulm f/u for pft (no h/o copd but CT showing emphysematous  changes)    #HTN w urgency on admission  home meds: norvasc, lisinopril, olmesartan  - f/u w outpatient provider regarding patient medication regimen as unsure why patient on both acei/arb  - would continue on ccb for now and resume arb once krystina resolves/returns to b/l    #KRYSTINA on CKD3 vs progressive CKD  - Cr 2.1 on admission - was 1.6 03/2024 > cont gentle hydration for now w repeat Cr in AM - if unimproved get Urine lytes    #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  - cont home olanzapine and fluphenazine     #Depression  - cont home bupropion    #Dementia  - cont home memantine w frequent reorientation and window room to prevent hospital acquired delirium     #Osseus Degenerative Changes  - check Vit D, outpatient dexa scan    #Atherosclerotic calcifications  - check lipid panel    #Mediastinal LAD - could be reactive  - outpatient f/u    DVT ppx: heparin subq  GI ppx: ppi while on steroids  Activity: iat  Diet: AAT #URI - likely viral in patient w undiagnosed copd/emphysema - no sepsis poa  - CT w biapical pleural thickening w paraseptal emphysematous changes; bibasilar atelectasis; areas of groundglass attenuation which could reflect air trapping/airway disease  - cont complete course of azithromycin, more for antiinflammatory properties along w short course of prednisone as slight expiratory wheeze heard on exam  - otherwise, nebs and inhalers, along w antitussives and decongestants prn  - outpatient pulm f/u for pft (no h/o copd but CT showing emphysematous  changes)    #HTN w urgency on admission  home meds: norvasc, lisinopril, olmesartan  - f/u w outpatient provider regarding patient medication regimen as unsure why patient on both acei/arb  - would continue on ccb for now and resume arb once krystina resolves/returns to b/l    #KRYSTINA on CKD3 vs progressive CKD  - Cr 2.1 on admission - was 1.6 03/2024 > cont gentle hydration for now w repeat Cr in AM - if unimproved get Urine lytes    #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

## 2025-06-17 NOTE — H&P ADULT - ASSESSMENT
79-year-old male with past medical history of HTN, MDD, schizophrenia, dementia presents to the ED for left lower quadrant abdominal pain associated shortness of breath and cough. pt states he has been having this sudden onset of SOB and cough with deep inspiration. States he is an active smoker. Vitals on admission significant for BP of 200/100 and 98% on 2L NC.     CT Angio Chest PE Protocol w/ IV Cont (06.17.25 @ 12:33) >  Negative for pulmonary emboli, Emphysematous changes. Bibasilar atelectasis. Areas of ground glass attenuation which could reflect air trapping/airway disease.    CT abd/pelvis - unremarkable    Labs: WBC 6K, neg trops, BUN/Cr 16/2.1, VBG pH7.36, lactate 3.3 >2.7, pCO2 38, UA neg    #AHRF 2/2 bronchitis and HTN urgency with pulm edema   #Not in COPD exacerbation  - No PE  - F/U RVP   - C/W advair discus and prn duonebs  - start empiric abx - rocephin and azithro   - needs tight Bp control   - holding home lisinopril and benicar iso krystina over ckd - needs better BP control meds, c/w norvasc, start hydralazine and uptitrate, can add a BB as desired   - Wean down O2    #KRYSTINA over CKD?  - s/p NS 500ml bolus  - Baseline Cr ~1.5  - trend for now   - Holding home meds     #MDD  #Schizophrenia   #Dementia   - C/W home meds, fluphenazine, olanzapine & namenda     #Misc   - Diet - DASH  - GI prophy - ppi   - DVT prophy - heparin   - Activity - IAT   79-year-old male with past medical history of HTN, MDD, schizophrenia, dementia presents to the ED for left lower quadrant abdominal pain associated shortness of breath and cough. pt states he has been having this sudden onset of SOB and cough with deep inspiration. States he is an active smoker. Vitals on admission significant for BP of 200/100 and 98% on 2L NC.     CT Angio Chest PE Protocol w/ IV Cont (06.17.25 @ 12:33) >  Negative for pulmonary emboli, Emphysematous changes. Bibasilar atelectasis. Areas of ground glass attenuation which could reflect air trapping/airway disease.    CT abd/pelvis - unremarkable    Labs: WBC 6K, neg trops, BUN/Cr 16/2.1, VBG pH7.36, lactate 3.3 >2.7, pCO2 38, UA neg    #AHRF 2/2 bronchitis and HTN urgency with pulm edema   #Not in COPD exacerbation  - No PE  - F/U RVP   - C/W advair discus and prn duonebs  - start empiric abx - rocephin and azithro   - needs tight Bp control   - holding home lisinopril and benicar iso krystina over ckd - needs better BP control meds, c/w norvasc, start hydralazine and uptitrate, can add a BB as desired   - Wean down O2  - started prednisone 40mg x 5 days for symptomatic improvement.    #KRYSTINA over CKD?  - s/p NS 500ml bolus  - Baseline Cr ~1.5  - trend for now   - Holding home meds     #MDD  #Schizophrenia   #Dementia   - C/W home meds, fluphenazine, olanzapine & namenda     #Misc   - Diet - DASH  - GI prophy - ppi   - DVT prophy - heparin   - Activity - IAT

## 2025-06-17 NOTE — H&P ADULT - HISTORY OF PRESENT ILLNESS
79-year-old male past medical history of schizophrenia presents to the ED for left lower quadrant abdominal pain associated shortness of breath and cough.  No palliative provoking factors.  Denies any fever, chest pain, nausea, vomiting, diarrhea, dysuria, hemoptysis, palpitations, lightheadedness.  · Temp at ED Arrival (C)	36.7 Degrees C · O2 Delivery/Oxygen Delivery Method	room air · SpO2 (%)	98 % · Temp site	oral · Temp (C)	36.7 Degrees C · Temp (F)	98.1 Degrees F · Respiration Rate (breaths/min)	18 /min · Heart Rate	  114 /min · BP Diastolic	  97 mm Hg · BP Systolic	  200 mm Hg  < from: CT Angio Chest PE Protocol w/ IV Cont (06.17.25 @ 12:33) > Negative for pulmonary emboli  Emphysematous changes. Bibasilar atelectasis. Areas of groundglass  attenuation which could reflect air trapping/airway disease.  < end of copied text >  Labs: WBC 6K, neg trops, BUN/Cr 16/2.1, VBG pH7.36, lactate 3.3 >2.7, pCO2 38, UA neg Patient is a 79-year-old male with past medical history of HTN, MDD, schizophrenia, dementia presents to the ED for left lower quadrant abdominal pain associated shortness of breath and cough. pt states he has been having this sudden onset of SOB and cough with deep inspiration. States he is an active smoker. Denies any fever, chest pain, nausea, vomiting, diarrhea, dysuria, hemoptysis, palpitations, lightheadedness, Sick contacts.   · Temp at ED Arrival (C)	36.7 Degrees C · O2 Delivery/Oxygen Delivery Method	room air · SpO2 (%)	98 % · Temp site	oral · Temp (C)	36.7 Degrees C · Temp (F)	98.1 Degrees F · Respiration Rate (breaths/min)	18 /min · Heart Rate	  114 /min · BP Diastolic	  97 mm Hg · BP Systolic	  200 mm Hg  CT Angio Chest PE Protocol w/ IV Cont (06.17.25 @ 12:33) > Negative for pulmonary emboli  Emphysematous changes. Bibasilar atelectasis. Areas of ground glass attenuation which could reflect air trapping/airway disease.  CT abd/pelvis - unremarkable  Labs: WBC 6K, neg trops, BUN/Cr 16/2.1, VBG pH7.36, lactate 3.3 >2.7, pCO2 38, UA neg

## 2025-06-17 NOTE — PATIENT PROFILE ADULT - TRANSPORTATION
Ella, [employer], calling back. Ella is wondering about OT - since FCE was completed, what is the treatment plan for continuing OT? How long? How many times a week? Patient has no longer been going to OT since FCE was completed. Ella also requesting last OV note from 7/25 to be faxed to her. Fax number [phone]. Advised will route to Dr Franklin and get back to her as soon as we hear back. Ella verbalizes understanding.     OV note 7/25 faxed to Ella.    yes

## 2025-06-17 NOTE — ED PROVIDER NOTE - PHYSICAL EXAMINATION
CONST: Well appearing in NAD  EYES: PERRL, EOMI, Sclera and conjunctiva clear.   ENT: Moist mucous membrane  CARD: Normal S1 S2; tachycardia   RESP: Bilateral expiratory wheeze mild tachypnea  GI: Soft, non-tender, non-distended.  MS: Normal ROM in all extremities. No midline spinal tenderness.  SKIN: Warm, dry, no acute rashes.   NEURO: A&Ox3, No focal deficits.

## 2025-06-17 NOTE — ED PROVIDER NOTE - ATTENDING APP SHARED VISIT CONTRIBUTION OF CARE
79-year-old male past medical history of schizophrenia hypertension long-term smoker, from [facility]'s residence, presents with 1 day of left upper quadrant pain shortness of breath associated with few days of dry cough and diarrhea.  No fever.  No chest pain.  No nausea vomiting.  No dysuria frequency hematuria.  No leg pain or swelling immobilization hormones hemoptysis.    On exam, AFVSS, Well appearing, No acute distress, NCAT, EOMI, PERRLA, MMM, Neck supple, mild tachypnea and wheezing bilaterally, RRR nl s1s2 No mrg, Abdomen Soft mild epigastric tenderness to palpation, no rebound or rigidity, no CVA tenderness, ND, AAOx3, No Focal Deficits, No LE edema or calf TTP,    A/P; concern for COPD exacerbation rule out pneumonia colitis kidney stones pancreatitis ACS, will do labs chest x-ray UA CT nebs steroids EKG troponin reeval

## 2025-06-17 NOTE — H&P ADULT - NSHPPHYSICALEXAM_GEN_ALL_CORE
GENERAL: NAD  HEART: Regular rate and rhythm  LUNGS: Unlabored respirations. B/L mild wheeze with crackles   ABDOMEN: Soft, nontender, nondistended, +BS  EXTREMITIES: 2+ peripheral pulses bilaterally. No clubbing, cyanosis, or edema  NERVOUS SYSTEM:  A&Ox3, moving all extremities, no focal deficits

## 2025-06-17 NOTE — ED PROVIDER NOTE - WR ORDER DATE AND TIME 1
"Lesli Lorenzana is a 79 year old female who is being evaluated via a billable telephone visit.      The patient has been notified of following:     \"This telephone visit will be conducted via a call between you and your physician/provider. We have found that certain health care needs can be provided without the need for a physical exam.  This service lets us provide the care you need with a short phone conversation.  If a prescription is necessary we can send it directly to your pharmacy.  If lab work is needed we can place an order for that and you can then stop by our lab to have the test done at a later time.    If during the course of the call the physician/provider feels a telephone visit is not appropriate, you will not be charged for this service.\"     Physician has received verbal consent for a Telephone Visit from the patient? 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    I have reviewed and updated the patient's Past Medical History, Social History, Family History and Medication List.    ALLERGIES  Amlodipine besylate and Amoxicillin    She is doing well at this visit.  She denies any abdominal pain, itching or skin rash and has only mild fatigue.  She denies any increased abdominal girth or lower extremity edema.  She denies any fevers or chills, cough or shortness of breath.  She denies any nausea or vomiting, diarrhea or constipation.  Her appetite has been good and she has gained a bit of weight this winter.    She does take her blood pressure on regular basis and has been under good control.  Her last blood work was from March 10.  Her creatinine was stable at 1.32 her liver tests were completely normal.  Her lipid panel was completely normal as was her CBC.  The only thing that has been a bit concern is that her urine albumin excretion was up to 317 mg/gram creatinine.    Assessment/Plan:  My impression is that Ms. Ferrera is doing well now 12 " years status post liver transplantation.  Her liver tests are completely normal and she is on minimal immunosuppression she is up-to-date with regard to vaccines and cancer screening.  Her blood pressure is under good control and all complications are well addressed.    As I mentioned I am a bit concerned about her urine albumin excretion and when the current concerns about pandemic subside I will have her seen by nephrology.    Otherwise she will return to see me in 6 months.    Thank you very much for let me participate in the care of this patient.  If you have any questions regarding recommendations please do not hesitate contact me.    Phone call duration: 15 minutes    Tucker Muhammad MD      Professor of Medicine  [employer]      Executive Medical Director, Solid Organ Transplant Program  [facility]        17-Jun-2025 09:40

## 2025-06-18 LAB
A1C WITH ESTIMATED AVERAGE GLUCOSE RESULT: 6.1 % — HIGH (ref 4–5.6)
ALBUMIN SERPL ELPH-MCNC: 4.2 G/DL — SIGNIFICANT CHANGE UP (ref 3.5–5.2)
ALP SERPL-CCNC: 76 U/L — SIGNIFICANT CHANGE UP (ref 30–115)
ALT FLD-CCNC: 14 U/L — SIGNIFICANT CHANGE UP (ref 0–41)
ANION GAP SERPL CALC-SCNC: 10 MMOL/L — SIGNIFICANT CHANGE UP (ref 7–14)
AST SERPL-CCNC: 15 U/L — SIGNIFICANT CHANGE UP (ref 0–41)
BASOPHILS # BLD AUTO: 0.01 K/UL — SIGNIFICANT CHANGE UP (ref 0–0.2)
BASOPHILS NFR BLD AUTO: 0.1 % — SIGNIFICANT CHANGE UP (ref 0–1)
BILIRUB SERPL-MCNC: 0.2 MG/DL — SIGNIFICANT CHANGE UP (ref 0.2–1.2)
BUN SERPL-MCNC: 21 MG/DL — HIGH (ref 10–20)
CALCIUM SERPL-MCNC: 9.1 MG/DL — SIGNIFICANT CHANGE UP (ref 8.4–10.5)
CHLORIDE SERPL-SCNC: 106 MMOL/L — SIGNIFICANT CHANGE UP (ref 98–110)
CHOLEST SERPL-MCNC: 179 MG/DL — SIGNIFICANT CHANGE UP
CO2 SERPL-SCNC: 22 MMOL/L — SIGNIFICANT CHANGE UP (ref 17–32)
CREAT SERPL-MCNC: 2.1 MG/DL — HIGH (ref 0.7–1.5)
EGFR: 32 ML/MIN/1.73M2 — LOW
EGFR: 32 ML/MIN/1.73M2 — LOW
EOSINOPHIL # BLD AUTO: 0 K/UL — SIGNIFICANT CHANGE UP (ref 0–0.7)
EOSINOPHIL NFR BLD AUTO: 0 % — SIGNIFICANT CHANGE UP (ref 0–8)
ESTIMATED AVERAGE GLUCOSE: 128 MG/DL — HIGH (ref 68–114)
FLUAV AG NPH QL: SIGNIFICANT CHANGE UP
FLUBV AG NPH QL: SIGNIFICANT CHANGE UP
GLUCOSE SERPL-MCNC: 125 MG/DL — HIGH (ref 70–99)
HCT VFR BLD CALC: 41.7 % — LOW (ref 42–52)
HDLC SERPL-MCNC: 44 MG/DL — SIGNIFICANT CHANGE UP
HGB BLD-MCNC: 13.7 G/DL — LOW (ref 14–18)
IMM GRANULOCYTES NFR BLD AUTO: 0.5 % — HIGH (ref 0.1–0.3)
LDLC SERPL-MCNC: 119 MG/DL — HIGH
LIPID PNL WITH DIRECT LDL SERPL: 119 MG/DL — HIGH
LYMPHOCYTES # BLD AUTO: 0.84 K/UL — LOW (ref 1.2–3.4)
LYMPHOCYTES # BLD AUTO: 6.8 % — LOW (ref 20.5–51.1)
MCHC RBC-ENTMCNC: 29.8 PG — SIGNIFICANT CHANGE UP (ref 27–31)
MCHC RBC-ENTMCNC: 32.9 G/DL — SIGNIFICANT CHANGE UP (ref 32–37)
MCV RBC AUTO: 90.7 FL — SIGNIFICANT CHANGE UP (ref 80–94)
MONOCYTES # BLD AUTO: 0.73 K/UL — HIGH (ref 0.1–0.6)
MONOCYTES NFR BLD AUTO: 5.9 % — SIGNIFICANT CHANGE UP (ref 1.7–9.3)
MRSA PCR RESULT.: NEGATIVE — SIGNIFICANT CHANGE UP
NEUTROPHILS # BLD AUTO: 10.63 K/UL — HIGH (ref 1.4–6.5)
NEUTROPHILS NFR BLD AUTO: 86.7 % — HIGH (ref 42.2–75.2)
NONHDLC SERPL-MCNC: 135 MG/DL — HIGH
NRBC BLD AUTO-RTO: 0 /100 WBCS — SIGNIFICANT CHANGE UP (ref 0–0)
PLATELET # BLD AUTO: 188 K/UL — SIGNIFICANT CHANGE UP (ref 130–400)
PMV BLD: 11.2 FL — HIGH (ref 7.4–10.4)
POTASSIUM SERPL-MCNC: 5 MMOL/L — SIGNIFICANT CHANGE UP (ref 3.5–5)
POTASSIUM SERPL-SCNC: 5 MMOL/L — SIGNIFICANT CHANGE UP (ref 3.5–5)
PROT SERPL-MCNC: 6.7 G/DL — SIGNIFICANT CHANGE UP (ref 6–8)
RBC # BLD: 4.6 M/UL — LOW (ref 4.7–6.1)
RBC # FLD: 13.9 % — SIGNIFICANT CHANGE UP (ref 11.5–14.5)
RSV RNA NPH QL NAA+NON-PROBE: SIGNIFICANT CHANGE UP
SARS-COV-2 RNA SPEC QL NAA+PROBE: SIGNIFICANT CHANGE UP
SODIUM SERPL-SCNC: 138 MMOL/L — SIGNIFICANT CHANGE UP (ref 135–146)
SOURCE RESPIRATORY: SIGNIFICANT CHANGE UP
TRIGL SERPL-MCNC: 86 MG/DL — SIGNIFICANT CHANGE UP
WBC # BLD: 12.27 K/UL — HIGH (ref 4.8–10.8)
WBC # FLD AUTO: 12.27 K/UL — HIGH (ref 4.8–10.8)

## 2025-06-18 PROCEDURE — 76770 US EXAM ABDO BACK WALL COMP: CPT | Mod: 26

## 2025-06-18 PROCEDURE — 99232 SBSQ HOSP IP/OBS MODERATE 35: CPT

## 2025-06-18 RX ORDER — NICOTINE POLACRILEX 4 MG/1
1 GUM, CHEWING ORAL DAILY
Refills: 0 | Status: DISCONTINUED | OUTPATIENT
Start: 2025-06-18 | End: 2025-07-01

## 2025-06-18 RX ORDER — ATORVASTATIN CALCIUM 80 MG/1
40 TABLET, FILM COATED ORAL AT BEDTIME
Refills: 0 | Status: DISCONTINUED | OUTPATIENT
Start: 2025-06-18 | End: 2025-07-01

## 2025-06-18 RX ORDER — SODIUM CHLORIDE 9 G/1000ML
1000 INJECTION, SOLUTION INTRAVENOUS
Refills: 0 | Status: DISCONTINUED | OUTPATIENT
Start: 2025-06-18 | End: 2025-06-19

## 2025-06-18 RX ORDER — AMLODIPINE BESYLATE 10 MG/1
5 TABLET ORAL DAILY
Refills: 0 | Status: DISCONTINUED | OUTPATIENT
Start: 2025-06-18 | End: 2025-06-20

## 2025-06-18 RX ORDER — CEFTRIAXONE 500 MG/1
1000 INJECTION, POWDER, FOR SOLUTION INTRAMUSCULAR; INTRAVENOUS EVERY 24 HOURS
Refills: 0 | Status: COMPLETED | OUTPATIENT
Start: 2025-06-18 | End: 2025-06-18

## 2025-06-18 RX ORDER — DEXTROMETHORPHAN HBR, GUAIFENESIN 200 MG/10ML
100 LIQUID ORAL EVERY 6 HOURS
Refills: 0 | Status: COMPLETED | OUTPATIENT
Start: 2025-06-18 | End: 2025-06-21

## 2025-06-18 RX ORDER — METHYLPREDNISOLONE ACETATE 80 MG/ML
40 INJECTION, SUSPENSION INTRA-ARTICULAR; INTRALESIONAL; INTRAMUSCULAR; SOFT TISSUE ONCE
Refills: 0 | Status: COMPLETED | OUTPATIENT
Start: 2025-06-18 | End: 2025-06-18

## 2025-06-18 RX ADMIN — DEXTROMETHORPHAN HBR, GUAIFENESIN 100 MILLIGRAM(S): 200 LIQUID ORAL at 23:07

## 2025-06-18 RX ADMIN — IPRATROPIUM BROMIDE AND ALBUTEROL SULFATE 3 MILLILITER(S): .5; 2.5 SOLUTION RESPIRATORY (INHALATION) at 21:06

## 2025-06-18 RX ADMIN — Medication 25 MILLIGRAM(S): at 13:04

## 2025-06-18 RX ADMIN — AMLODIPINE BESYLATE 5 MILLIGRAM(S): 10 TABLET ORAL at 21:35

## 2025-06-18 RX ADMIN — DEXTROMETHORPHAN HBR, GUAIFENESIN 100 MILLIGRAM(S): 200 LIQUID ORAL at 18:30

## 2025-06-18 RX ADMIN — CEFTRIAXONE 100 MILLIGRAM(S): 500 INJECTION, POWDER, FOR SOLUTION INTRAMUSCULAR; INTRAVENOUS at 13:00

## 2025-06-18 RX ADMIN — ATORVASTATIN CALCIUM 40 MILLIGRAM(S): 80 TABLET, FILM COATED ORAL at 21:35

## 2025-06-18 RX ADMIN — OLANZAPINE 20 MILLIGRAM(S): 10 TABLET ORAL at 21:36

## 2025-06-18 RX ADMIN — FOLIC ACID 1 MILLIGRAM(S): 1 TABLET ORAL at 13:02

## 2025-06-18 RX ADMIN — HEPARIN SODIUM 5000 UNIT(S): 1000 INJECTION INTRAVENOUS; SUBCUTANEOUS at 21:36

## 2025-06-18 RX ADMIN — Medication 25 MILLIGRAM(S): at 05:33

## 2025-06-18 RX ADMIN — NICOTINE POLACRILEX 1 PATCH: 4 GUM, CHEWING ORAL at 13:05

## 2025-06-18 RX ADMIN — Medication 1 APPLICATION(S): at 05:30

## 2025-06-18 RX ADMIN — IPRATROPIUM BROMIDE AND ALBUTEROL SULFATE 3 MILLILITER(S): .5; 2.5 SOLUTION RESPIRATORY (INHALATION) at 01:47

## 2025-06-18 RX ADMIN — PREDNISONE 40 MILLIGRAM(S): 20 TABLET ORAL at 05:27

## 2025-06-18 RX ADMIN — HEPARIN SODIUM 5000 UNIT(S): 1000 INJECTION INTRAVENOUS; SUBCUTANEOUS at 13:01

## 2025-06-18 RX ADMIN — Medication 100 MILLIGRAM(S): at 13:02

## 2025-06-18 RX ADMIN — AMLODIPINE BESYLATE 5 MILLIGRAM(S): 10 TABLET ORAL at 05:27

## 2025-06-18 RX ADMIN — METHYLPREDNISOLONE ACETATE 40 MILLIGRAM(S): 80 INJECTION, SUSPENSION INTRA-ARTICULAR; INTRALESIONAL; INTRAMUSCULAR; SOFT TISSUE at 13:01

## 2025-06-18 RX ADMIN — IPRATROPIUM BROMIDE AND ALBUTEROL SULFATE 3 MILLILITER(S): .5; 2.5 SOLUTION RESPIRATORY (INHALATION) at 13:40

## 2025-06-18 RX ADMIN — MEMANTINE HYDROCHLORIDE 5 MILLIGRAM(S): 21 CAPSULE, EXTENDED RELEASE ORAL at 21:36

## 2025-06-18 RX ADMIN — BUPROPION HYDROBROMIDE 150 MILLIGRAM(S): 522 TABLET, EXTENDED RELEASE ORAL at 13:02

## 2025-06-18 RX ADMIN — Medication 25 MILLIGRAM(S): at 21:35

## 2025-06-18 RX ADMIN — HEPARIN SODIUM 5000 UNIT(S): 1000 INJECTION INTRAVENOUS; SUBCUTANEOUS at 05:28

## 2025-06-18 RX ADMIN — Medication 250 MILLIGRAM(S): at 21:40

## 2025-06-18 RX ADMIN — Medication 40 MILLIGRAM(S): at 09:04

## 2025-06-18 RX ADMIN — Medication 10 MILLIGRAM(S): at 13:02

## 2025-06-18 RX ADMIN — NICOTINE POLACRILEX 1 PATCH: 4 GUM, CHEWING ORAL at 18:31

## 2025-06-18 RX ADMIN — IPRATROPIUM BROMIDE AND ALBUTEROL SULFATE 3 MILLILITER(S): .5; 2.5 SOLUTION RESPIRATORY (INHALATION) at 09:04

## 2025-06-18 NOTE — PROGRESS NOTE ADULT - SUBJECTIVE AND OBJECTIVE BOX
SUBJECTIVE/OVERNIGHT EVENTS  Today is hospital day 1d. This morning patient was seen and examined at bedside, resting comfortably in bed. No acute or major events overnight.    HOSPITAL COURSE  Day 1:   Day 2:   Day 3:     CODE STATUS:    FAMILY COMMUNICATION  Contact date:  Name of person contacted:  Relationship to patient:  Communication details:    MEDICATIONS  STANDING MEDICATIONS  albuterol    90 MICROgram(s) HFA Inhaler 2 Puff(s) Inhalation two times a day  albuterol/ipratropium for Nebulization 3 milliLiter(s) Nebulizer every 6 hours  albuterol/ipratropium for Nebulization.. 3 milliLiter(s) Nebulizer every 20 minutes  amLODIPine   Tablet 5 milliGRAM(s) Oral daily  azithromycin  IVPB 500 milliGRAM(s) IV Intermittent every 24 hours  azithromycin  IVPB      buPROPion XL (24-Hour) . 150 milliGRAM(s) Oral daily  chlorhexidine 2% Cloths 1 Application(s) Topical <User Schedule>  fluPHENAZine 10 milliGRAM(s) Oral daily  folic acid 1 milliGRAM(s) Oral daily  heparin   Injectable 5000 Unit(s) SubCutaneous every 8 hours  hydrALAZINE 25 milliGRAM(s) Oral three times a day  memantine 5 milliGRAM(s) Oral at bedtime  methylPREDNISolone sodium succinate Injectable 40 milliGRAM(s) IV Push once  OLANZapine 20 milliGRAM(s) Oral at bedtime  pantoprazole    Tablet 40 milliGRAM(s) Oral before breakfast  predniSONE   Tablet 40 milliGRAM(s) Oral daily  thiamine 100 milliGRAM(s) Oral daily    PRN MEDICATIONS  acetaminophen     Tablet .. 650 milliGRAM(s) Oral every 6 hours PRN  aluminum hydroxide/magnesium hydroxide/simethicone Suspension 30 milliLiter(s) Oral every 4 hours PRN  melatonin 3 milliGRAM(s) Oral at bedtime PRN  ondansetron Injectable 4 milliGRAM(s) IV Push every 8 hours PRN    VITALS  T(F): 98.4 (06-18-25 @ 05:00), Max: 98.6 (06-17-25 @ 21:35)  HR: 99 (06-18-25 @ 05:00) (69 - 102)  BP: 152/79 (06-18-25 @ 05:00) (152/79 - 169/90)  RR: 18 (06-18-25 @ 05:00) (18 - 20)  SpO2: 97% (06-18-25 @ 05:00) (96% - 98%)    PHYSICAL EXAM  GENERAL: NAD, lying in bed comfortably  HEART: Regular rate and rhythm, no murmurs, rubs, or gallops  LUNGS: Unlabored respirations.  b/l wheeze  ABDOMEN: Soft, nontender, nondistended  EXTREMITIES: No clubbing, cyanosis, or edema  NERVOUS SYSTEM:  A&Ox3, no focal deficits   SKIN: No rashes or lesions    LABS             13.7   12.27 )-----------( 188      ( 06-18-25 @ 08:16 )             41.7     138  |  106  |  21  -------------------------<  125   06-18-25 @ 08:16  5.0  |  22  |  2.1    Ca      9.1     06-18-25 @ 08:16    TPro  6.7  /  Alb  4.2  /  TBili  0.2  /  DBili  x   /  AST  15  /  ALT  14  /  AlkPhos  76  /  GGT  x     06-18-25 @ 08:16      Troponin T, High Sensitivity Result: 9 ng/L (06-17-25 @ 14:15)  Pro-Brain Natriuretic Peptide: <36 pg/mL (06-17-25 @ 09:47)  Troponin T, High Sensitivity Result: 11 ng/L (06-17-25 @ 09:47)    Urinalysis Basic - ( 18 Jun 2025 08:16 )    Color: x / Appearance: x / SG: x / pH: x  Gluc: 125 mg/dL / Ketone: x  / Bili: x / Urobili: x   Blood: x / Protein: x / Nitrite: x   Leuk Esterase: x / RBC: x / WBC x   Sq Epi: x / Non Sq Epi: x / Bacteria: x          Urinalysis with Rflx Culture (collected 17 Jun 2025 09:26)      IMAGING

## 2025-06-18 NOTE — PROGRESS NOTE ADULT - ASSESSMENT
79-year-old male with past medical history of HTN, MDD, schizophrenia, dementia presents to the ED for left lower quadrant abdominal pain associated shortness of breath and cough. pt states he has been having this sudden onset of SOB and cough with deep inspiration. States he is an active smoker. Vitals on admission significant for BP of 200/100 and 98% on 2L NC.     Pt was admitted for SOB.    #Dyspnea, couch 2/2 reactive airway vs/ uri  #Emphysema  #Active Smoker  - No PE  - F/U RVP   - C/W advair discus and prn duonebs  - start empiric abx - rocephin and azithro   - needs tight Bp control   - holding home lisinopril and benicar iso krystina over ckd - needs better BP control meds, c/w norvasc, start hydralazine and uptitrate, can add a BB as desired   - Wean down O2  - started prednisone 40mg x 5 days for symptomatic improvement.    #KRYSTINA over CKD?  - s/p NS 500ml bolus  - Baseline Cr ~1.5  - trend for now   - Holding home meds     #MDD  #Schizophrenia   #Dementia   - C/W home meds, fluphenazine, olanzapine & namenda     #Misc   - Diet - DASH  - GI prophy - ppi   - DVT prophy - heparin   - Activity - IAT 79-year-old male with past medical history of HTN, MDD, schizophrenia, dementia presents to the ED for left lower quadrant abdominal pain associated shortness of breath and cough. pt states he has been having this sudden onset of SOB and cough with deep inspiration. States he is an active smoker. Vitals on admission significant for BP of 200/100 and 98% on 2L NC.     Pt was admitted for SOB.    #Dyspnea, Cough 2/2 reactive airway vs URI  #Emphysema not on home o2  #Active Smoker  - saturating well on 3L NC --> wean off oxygen, maintain SaO2 88-92%  - mild wheezing b/l --> iv solumedrol 40 x1 dose, c/w prednisone 40mg 5 day course, c/w duoneb  - Robitussin 100mg q6h for 3 days for cough  - CT Angio Chest: no PE, Emphysematous changes. Bibasilar atelectasis. Areas of ground glass attenuation which could reflect air trapping/airway disease.  - SIRS negative, MRSA NG, UA NG --> c/w empiric iv zithromax, ordered second rocephin dose, f/u flu panel, procal and BCx (processing)  - reports smoking half pack cigarettes per day --> nicotine patch 14mg q24hr    #HTN urgency on admission - resolved  #HTN  - BP in triage 200/97  - c/w home norvasc  - home olmesartan and lisinopril (on both ace and arb?) held on admission d/t possible krystina --> started on hydralazine 25 TID    #KRYSTINA on CKD3b vs CKD progression  - Cr 2.1 on admission, was 1.6 on 3/2024  - s/p NS 500ml bolus w/o Cr improvement --> starting LR 60cc/hr for 12 hours    #MDD  #Schizophrenia   #Dementia   - C/W home meds, fluphenazine, olanzapine & namenda     #Misc   - Diet - DASH  - GI prophy - ppi   - DVT prophy - heparin   - Activity - IAT  - Dispo: from adult home, uses walker for ambulation    Pending:  - clinical improvement  - weaning off O2  - f/u infectious w/u  - ordering CBC/BMP for tmrw am to monitor wbc and cr   79-year-old male with past medical history of HTN, MDD, schizophrenia, dementia presents to the ED for left lower quadrant abdominal pain associated shortness of breath and cough. pt states he has been having this sudden onset of SOB and cough with deep inspiration. States he is an active smoker. Vitals on admission significant for BP of 200/100 and 98% on 2L NC.     Pt was admitted for SOB.    #Dyspnea, Cough 2/2 reactive airway vs URI  #Emphysema not on home o2  #Active Smoker  - saturating well on 3L NC --> wean off oxygen, maintain SaO2 88-92%  - mild wheezing b/l --> iv solumedrol 40 x1 dose, c/w prednisone 40mg 5 day course, c/w duoneb  - Robitussin 100mg q6h for 3 days for cough  - CT Angio Chest: no PE, Emphysematous changes. Bibasilar atelectasis. Areas of ground glass attenuation which could reflect air trapping/airway disease.  - SIRS negative, MRSA NG, UA NG --> c/w empiric iv zithromax, ordered second rocephin dose, f/u flu panel, procal and BCx (processing)  - reports smoking half pack cigarettes per day --> nicotine patch 14mg q24hr    #HTN urgency on admission - resolved  #HTN  - BP in triage 200/97  - c/w home norvasc  - home olmesartan and lisinopril (on both ace and arb?) held on admission d/t possible krystina --> started on hydralazine 25 TID    #KRYSTINA on CKD3b vs CKD progression  - Cr 2.1 on admission, was 1.6 on 3/2024  - s/p NS 500ml bolus w/o Cr improvement --> starting LR 60cc/hr for 12 hours    #MDD  #Schizophrenia   #Dementia   - C/W home meds, fluphenazine, olanzapine & namenda     #Prediabetic  - a1c 6.1    #Misc   - Diet - DASH  - GI prophy - ppi   - DVT prophy - heparin   - Activity - IAT  - Dispo: from adult home, uses walker for ambulation    Pending:  - clinical improvement  - weaning off O2  - f/u infectious w/u  - ordering CBC/BMP for tmrw am to monitor wbc and cr   79-year-old male with past medical history of HTN, MDD, schizophrenia, dementia presents to the ED for left lower quadrant abdominal pain associated shortness of breath and cough. pt states he has been having this sudden onset of SOB and cough with deep inspiration. States he is an active smoker. Vitals on admission significant for BP of 200/100 and 98% on 2L NC.     Pt was admitted for SOB.    #Dyspnea, Cough 2/2 reactive airway vs URI  #Emphysema not on home o2  #Active Smoker  - saturating well on 3L NC --> wean off oxygen, maintain SaO2 88-92%  - mild wheezing b/l --> iv solumedrol 40 x1 dose, c/w prednisone 40mg 5 day course, c/w duoneb  - Robitussin 100mg q6h for 3 days for cough  - CT Angio Chest: no PE, Emphysematous changes. Bibasilar atelectasis. Areas of ground glass attenuation which could reflect air trapping/airway disease.  - SIRS negative, MRSA NG, UA NG --> c/w empiric iv zithromax, ordered second rocephin dose, f/u flu panel, procal and BCx (processing)  - reports smoking half pack cigarettes per day --> nicotine patch 14mg q24hr    #HTN urgency on admission - resolved  #HTN  - BP in triage 200/97  - c/w home norvasc  - home olmesartan and lisinopril (on both ace and arb?) held on admission d/t possible krystina --> started on hydralazine 25 TID    #KRYSTINA on CKD3b vs CKD progression  - Cr 2.1 on admission, was 1.6 on 3/2024  - s/p NS 500ml bolus w/o Cr improvement --> starting LR 60cc/hr for 12 hours    #MDD  #Schizophrenia   #Dementia   - C/W home meds, fluphenazine, olanzapine & namenda     #Prediabetic  - a1c 6.1    #DLD  ASCVD score 43% --> starting Lipitor 40    #Misc   - Diet - DASH  - GI prophy - ppi   - DVT prophy - heparin   - Activity - IAT  - Dispo: from adult home, uses walker for ambulation    Pending:  - clinical improvement  - weaning off O2  - f/u infectious w/u  - ordering CBC/BMP for tmrw am to monitor wbc and cr

## 2025-06-18 NOTE — PROGRESS NOTE ADULT - ATTENDING COMMENTS
#URI - likely viral in patient w undiagnosed copd/emphysema - no sepsis poa  - CT w biapical pleural thickening w paraseptal emphysematous changes; bibasilar atelectasis; areas of ground-glass attenuation which could reflect air trapping/airway disease  - cont complete course of azithromycin, more for antiinflammatory properties along w short course of prednisone as slight expiratory wheeze heard on exam  - otherwise, nebs and inhalers, along w antitussives and decongestants prn  - outpatient pulm f/u for pft (no h/o copd but CT showing emphysematous  changes)  iv steroids today now on oral pred  wean off oxygen  if worsening symptoms consult pulm    #HTN w urgency on admission, improved   home meds: norvasc, lisinopril, olmesartan  - f/u w outpatient provider regarding patient medication regimen as unsure why patient on both acei/arb  - would continue on ccb for now and resume arb once krystina resolves/returns to b/l    #KRYSTINA on CKD3 vs progressive CKD  - Cr 2.1 on admission - was 1.6 03/2024 > cont gentle hydration for now w repeat Cr in AM   will check renal bladder Us pending  daily labs     #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 follow procal. complete abx course if procal elevated. ceftriaxone dose given today.   also on azithromycin   follow cultures   monitor serum creatinine   if worsening hypoxia consult pulm

## 2025-06-19 LAB
-  STAPHYLOCOCCUS EPIDERMIDIS: SIGNIFICANT CHANGE UP
ANION GAP SERPL CALC-SCNC: 11 MMOL/L — SIGNIFICANT CHANGE UP (ref 7–14)
APTT BLD: 25.5 SEC — LOW (ref 27–39.2)
BASOPHILS # BLD AUTO: 0.02 K/UL — SIGNIFICANT CHANGE UP (ref 0–0.2)
BASOPHILS NFR BLD AUTO: 0.1 % — SIGNIFICANT CHANGE UP (ref 0–1)
BUN SERPL-MCNC: 29 MG/DL — HIGH (ref 10–20)
CALCIUM SERPL-MCNC: 8.8 MG/DL — SIGNIFICANT CHANGE UP (ref 8.4–10.5)
CHLORIDE SERPL-SCNC: 109 MMOL/L — SIGNIFICANT CHANGE UP (ref 98–110)
CK SERPL-CCNC: 362 U/L — HIGH (ref 0–225)
CO2 SERPL-SCNC: 21 MMOL/L — SIGNIFICANT CHANGE UP (ref 17–32)
CREAT SERPL-MCNC: 2.2 MG/DL — HIGH (ref 0.7–1.5)
CULTURE RESULTS: ABNORMAL
EGFR: 30 ML/MIN/1.73M2 — LOW
EGFR: 30 ML/MIN/1.73M2 — LOW
EOSINOPHIL # BLD AUTO: 0 K/UL — SIGNIFICANT CHANGE UP (ref 0–0.7)
EOSINOPHIL NFR BLD AUTO: 0 % — SIGNIFICANT CHANGE UP (ref 0–8)
GLUCOSE SERPL-MCNC: 118 MG/DL — HIGH (ref 70–99)
GRAM STN FLD: ABNORMAL
GRAM STN FLD: ABNORMAL
HCT VFR BLD CALC: 42.5 % — SIGNIFICANT CHANGE UP (ref 42–52)
HCT VFR BLD CALC: 43.7 % — SIGNIFICANT CHANGE UP (ref 42–52)
HGB BLD-MCNC: 14.1 G/DL — SIGNIFICANT CHANGE UP (ref 14–18)
HGB BLD-MCNC: 14.1 G/DL — SIGNIFICANT CHANGE UP (ref 14–18)
IMM GRANULOCYTES NFR BLD AUTO: 1.3 % — HIGH (ref 0.1–0.3)
INR BLD: 0.91 RATIO — SIGNIFICANT CHANGE UP (ref 0.65–1.3)
LACTATE SERPL-SCNC: 2.8 MMOL/L — HIGH (ref 0.7–2)
LYMPHOCYTES # BLD AUTO: 0.84 K/UL — LOW (ref 1.2–3.4)
LYMPHOCYTES # BLD AUTO: 4.6 % — LOW (ref 20.5–51.1)
MAGNESIUM SERPL-MCNC: 1.8 MG/DL — SIGNIFICANT CHANGE UP (ref 1.8–2.4)
MCHC RBC-ENTMCNC: 29.7 PG — SIGNIFICANT CHANGE UP (ref 27–31)
MCHC RBC-ENTMCNC: 30 PG — SIGNIFICANT CHANGE UP (ref 27–31)
MCHC RBC-ENTMCNC: 32.3 G/DL — SIGNIFICANT CHANGE UP (ref 32–37)
MCHC RBC-ENTMCNC: 33.2 G/DL — SIGNIFICANT CHANGE UP (ref 32–37)
MCV RBC AUTO: 90.4 FL — SIGNIFICANT CHANGE UP (ref 80–94)
MCV RBC AUTO: 92 FL — SIGNIFICANT CHANGE UP (ref 80–94)
METHOD TYPE: SIGNIFICANT CHANGE UP
MONOCYTES # BLD AUTO: 0.62 K/UL — HIGH (ref 0.1–0.6)
MONOCYTES NFR BLD AUTO: 3.4 % — SIGNIFICANT CHANGE UP (ref 1.7–9.3)
NEUTROPHILS # BLD AUTO: 16.53 K/UL — HIGH (ref 1.4–6.5)
NEUTROPHILS NFR BLD AUTO: 90.6 % — HIGH (ref 42.2–75.2)
NRBC BLD AUTO-RTO: 0 /100 WBCS — SIGNIFICANT CHANGE UP (ref 0–0)
NRBC BLD AUTO-RTO: 0 /100 WBCS — SIGNIFICANT CHANGE UP (ref 0–0)
ORGANISM # SPEC MICROSCOPIC CNT: ABNORMAL
ORGANISM # SPEC MICROSCOPIC CNT: SIGNIFICANT CHANGE UP
PHOSPHATE SERPL-MCNC: 4.7 MG/DL — SIGNIFICANT CHANGE UP (ref 2.1–4.9)
PLATELET # BLD AUTO: 201 K/UL — SIGNIFICANT CHANGE UP (ref 130–400)
PLATELET # BLD AUTO: 223 K/UL — SIGNIFICANT CHANGE UP (ref 130–400)
PMV BLD: 11.3 FL — HIGH (ref 7.4–10.4)
PMV BLD: 11.7 FL — HIGH (ref 7.4–10.4)
POTASSIUM SERPL-MCNC: 4.5 MMOL/L — SIGNIFICANT CHANGE UP (ref 3.5–5)
POTASSIUM SERPL-SCNC: 4.5 MMOL/L — SIGNIFICANT CHANGE UP (ref 3.5–5)
PROCALCITONIN SERPL-MCNC: 0.05 NG/ML — SIGNIFICANT CHANGE UP (ref 0.02–0.1)
PROTHROM AB SERPL-ACNC: 10.7 SEC — SIGNIFICANT CHANGE UP (ref 9.95–12.87)
RBC # BLD: 4.7 M/UL — SIGNIFICANT CHANGE UP (ref 4.7–6.1)
RBC # BLD: 4.75 M/UL — SIGNIFICANT CHANGE UP (ref 4.7–6.1)
RBC # FLD: 14.1 % — SIGNIFICANT CHANGE UP (ref 11.5–14.5)
RBC # FLD: 14.3 % — SIGNIFICANT CHANGE UP (ref 11.5–14.5)
SODIUM SERPL-SCNC: 141 MMOL/L — SIGNIFICANT CHANGE UP (ref 135–146)
SPECIMEN SOURCE: SIGNIFICANT CHANGE UP
SPECIMEN SOURCE: SIGNIFICANT CHANGE UP
TROPONIN T, HIGH SENSITIVITY RESULT: 16 NG/L — SIGNIFICANT CHANGE UP (ref 6–21)
VANCOMYCIN FLD-MCNC: 10.6 UG/ML — HIGH (ref 5–10)
WBC # BLD: 18.24 K/UL — HIGH (ref 4.8–10.8)
WBC # BLD: 18.25 K/UL — HIGH (ref 4.8–10.8)
WBC # FLD AUTO: 18.24 K/UL — HIGH (ref 4.8–10.8)
WBC # FLD AUTO: 18.25 K/UL — HIGH (ref 4.8–10.8)

## 2025-06-19 PROCEDURE — 71045 X-RAY EXAM CHEST 1 VIEW: CPT | Mod: 26

## 2025-06-19 PROCEDURE — 99233 SBSQ HOSP IP/OBS HIGH 50: CPT

## 2025-06-19 PROCEDURE — 93306 TTE W/DOPPLER COMPLETE: CPT | Mod: 26

## 2025-06-19 PROCEDURE — 99497 ADVNCD CARE PLAN 30 MIN: CPT | Mod: 25

## 2025-06-19 RX ORDER — METHYLPREDNISOLONE ACETATE 80 MG/ML
60 INJECTION, SUSPENSION INTRA-ARTICULAR; INTRALESIONAL; INTRAMUSCULAR; SOFT TISSUE AT BEDTIME
Refills: 0 | Status: DISCONTINUED | OUTPATIENT
Start: 2025-06-19 | End: 2025-06-19

## 2025-06-19 RX ORDER — METHYLPREDNISOLONE ACETATE 80 MG/ML
20 INJECTION, SUSPENSION INTRA-ARTICULAR; INTRALESIONAL; INTRAMUSCULAR; SOFT TISSUE ONCE
Refills: 0 | Status: COMPLETED | OUTPATIENT
Start: 2025-06-19 | End: 2025-06-19

## 2025-06-19 RX ORDER — METRONIDAZOLE 250 MG
500 TABLET ORAL EVERY 8 HOURS
Refills: 0 | Status: DISCONTINUED | OUTPATIENT
Start: 2025-06-19 | End: 2025-06-21

## 2025-06-19 RX ORDER — CEFTRIAXONE 500 MG/1
2000 INJECTION, POWDER, FOR SOLUTION INTRAMUSCULAR; INTRAVENOUS EVERY 24 HOURS
Refills: 0 | Status: DISCONTINUED | OUTPATIENT
Start: 2025-06-19 | End: 2025-06-21

## 2025-06-19 RX ORDER — FUROSEMIDE 10 MG/ML
40 INJECTION INTRAMUSCULAR; INTRAVENOUS ONCE
Refills: 0 | Status: COMPLETED | OUTPATIENT
Start: 2025-06-19 | End: 2025-06-19

## 2025-06-19 RX ORDER — IOHEXOL 350 MG/ML
30 INJECTION, SOLUTION INTRAVENOUS ONCE
Refills: 0 | Status: COMPLETED | OUTPATIENT
Start: 2025-06-19 | End: 2025-06-19

## 2025-06-19 RX ORDER — VANCOMYCIN HCL IN 5 % DEXTROSE 1.5G/250ML
1250 PLASTIC BAG, INJECTION (ML) INTRAVENOUS EVERY 24 HOURS
Refills: 0 | Status: DISCONTINUED | OUTPATIENT
Start: 2025-06-19 | End: 2025-06-19

## 2025-06-19 RX ORDER — METHYLPREDNISOLONE ACETATE 80 MG/ML
40 INJECTION, SUSPENSION INTRA-ARTICULAR; INTRALESIONAL; INTRAMUSCULAR; SOFT TISSUE EVERY 12 HOURS
Refills: 0 | Status: DISCONTINUED | OUTPATIENT
Start: 2025-06-20 | End: 2025-06-23

## 2025-06-19 RX ORDER — VANCOMYCIN HCL IN 5 % DEXTROSE 1.5G/250ML
1000 PLASTIC BAG, INJECTION (ML) INTRAVENOUS ONCE
Refills: 0 | Status: DISCONTINUED | OUTPATIENT
Start: 2025-06-19 | End: 2025-06-19

## 2025-06-19 RX ORDER — METHYLPREDNISOLONE ACETATE 80 MG/ML
125 INJECTION, SUSPENSION INTRA-ARTICULAR; INTRALESIONAL; INTRAMUSCULAR; SOFT TISSUE ONCE
Refills: 0 | Status: COMPLETED | OUTPATIENT
Start: 2025-06-19 | End: 2025-06-19

## 2025-06-19 RX ORDER — SODIUM CHLORIDE 9 G/1000ML
1000 INJECTION, SOLUTION INTRAVENOUS ONCE
Refills: 0 | Status: COMPLETED | OUTPATIENT
Start: 2025-06-19 | End: 2025-06-19

## 2025-06-19 RX ADMIN — IPRATROPIUM BROMIDE AND ALBUTEROL SULFATE 3 MILLILITER(S): .5; 2.5 SOLUTION RESPIRATORY (INHALATION) at 20:19

## 2025-06-19 RX ADMIN — Medication 10 MILLIGRAM(S): at 12:18

## 2025-06-19 RX ADMIN — NICOTINE POLACRILEX 1 PATCH: 4 GUM, CHEWING ORAL at 12:33

## 2025-06-19 RX ADMIN — DEXTROMETHORPHAN HBR, GUAIFENESIN 100 MILLIGRAM(S): 200 LIQUID ORAL at 12:19

## 2025-06-19 RX ADMIN — HEPARIN SODIUM 5000 UNIT(S): 1000 INJECTION INTRAVENOUS; SUBCUTANEOUS at 13:41

## 2025-06-19 RX ADMIN — Medication 100 MILLIGRAM(S): at 12:19

## 2025-06-19 RX ADMIN — Medication 1 APPLICATION(S): at 06:24

## 2025-06-19 RX ADMIN — Medication 25 MILLIGRAM(S): at 06:28

## 2025-06-19 RX ADMIN — Medication 100 MILLIGRAM(S): at 21:33

## 2025-06-19 RX ADMIN — NICOTINE POLACRILEX 1 PATCH: 4 GUM, CHEWING ORAL at 12:19

## 2025-06-19 RX ADMIN — Medication 40 MILLIGRAM(S): at 06:26

## 2025-06-19 RX ADMIN — IOHEXOL 30 MILLILITER(S): 350 INJECTION, SOLUTION INTRAVENOUS at 17:19

## 2025-06-19 RX ADMIN — Medication 25 MILLIGRAM(S): at 13:41

## 2025-06-19 RX ADMIN — BUPROPION HYDROBROMIDE 150 MILLIGRAM(S): 522 TABLET, EXTENDED RELEASE ORAL at 12:18

## 2025-06-19 RX ADMIN — DEXTROMETHORPHAN HBR, GUAIFENESIN 100 MILLIGRAM(S): 200 LIQUID ORAL at 17:19

## 2025-06-19 RX ADMIN — HEPARIN SODIUM 5000 UNIT(S): 1000 INJECTION INTRAVENOUS; SUBCUTANEOUS at 21:33

## 2025-06-19 RX ADMIN — Medication 166.67 MILLIGRAM(S): at 02:24

## 2025-06-19 RX ADMIN — METHYLPREDNISOLONE ACETATE 125 MILLIGRAM(S): 80 INJECTION, SUSPENSION INTRA-ARTICULAR; INTRALESIONAL; INTRAMUSCULAR; SOFT TISSUE at 21:34

## 2025-06-19 RX ADMIN — METHYLPREDNISOLONE ACETATE 20 MILLIGRAM(S): 80 INJECTION, SUSPENSION INTRA-ARTICULAR; INTRALESIONAL; INTRAMUSCULAR; SOFT TISSUE at 12:17

## 2025-06-19 RX ADMIN — DEXTROMETHORPHAN HBR, GUAIFENESIN 100 MILLIGRAM(S): 200 LIQUID ORAL at 06:28

## 2025-06-19 RX ADMIN — FUROSEMIDE 40 MILLIGRAM(S): 10 INJECTION INTRAMUSCULAR; INTRAVENOUS at 22:24

## 2025-06-19 RX ADMIN — AMLODIPINE BESYLATE 5 MILLIGRAM(S): 10 TABLET ORAL at 06:26

## 2025-06-19 RX ADMIN — ATORVASTATIN CALCIUM 40 MILLIGRAM(S): 80 TABLET, FILM COATED ORAL at 21:34

## 2025-06-19 RX ADMIN — FOLIC ACID 1 MILLIGRAM(S): 1 TABLET ORAL at 12:18

## 2025-06-19 RX ADMIN — IPRATROPIUM BROMIDE AND ALBUTEROL SULFATE 3 MILLILITER(S): .5; 2.5 SOLUTION RESPIRATORY (INHALATION) at 09:16

## 2025-06-19 RX ADMIN — Medication 25 MILLIGRAM(S): at 21:34

## 2025-06-19 RX ADMIN — HEPARIN SODIUM 5000 UNIT(S): 1000 INJECTION INTRAVENOUS; SUBCUTANEOUS at 06:27

## 2025-06-19 RX ADMIN — NICOTINE POLACRILEX 1 PATCH: 4 GUM, CHEWING ORAL at 18:20

## 2025-06-19 RX ADMIN — MEMANTINE HYDROCHLORIDE 5 MILLIGRAM(S): 21 CAPSULE, EXTENDED RELEASE ORAL at 21:33

## 2025-06-19 RX ADMIN — PREDNISONE 40 MILLIGRAM(S): 20 TABLET ORAL at 06:26

## 2025-06-19 RX ADMIN — IPRATROPIUM BROMIDE AND ALBUTEROL SULFATE 3 MILLILITER(S): .5; 2.5 SOLUTION RESPIRATORY (INHALATION) at 02:46

## 2025-06-19 RX ADMIN — IPRATROPIUM BROMIDE AND ALBUTEROL SULFATE 3 MILLILITER(S): .5; 2.5 SOLUTION RESPIRATORY (INHALATION) at 14:36

## 2025-06-19 RX ADMIN — OLANZAPINE 20 MILLIGRAM(S): 10 TABLET ORAL at 21:33

## 2025-06-19 RX ADMIN — CEFTRIAXONE 100 MILLIGRAM(S): 500 INJECTION, POWDER, FOR SOLUTION INTRAMUSCULAR; INTRAVENOUS at 17:19

## 2025-06-19 NOTE — PROGRESS NOTE ADULT - ATTENDING COMMENTS
Interval history: Pt seen and examined at bedside. No cp or sob.   Vital Signs (24 Hrs):  T(C): 36.7 (06-19-25 @ 12:00), Max: 36.9 (06-18-25 @ 19:27)  HR: 84 (06-19-25 @ 12:00) (84 - 87)  BP: 132/70 (06-19-25 @ 12:00) (132/70 - 148/72)  RR: 16 (06-19-25 @ 12:00) (16 - 18)  SpO2: 98% (06-19-25 @ 12:00) (97% - 98%)  Wt(kg): --  Daily     Daily     I&O's Summary    18 Jun 2025 07:01  -  19 Jun 2025 07:00  --------------------------------------------------------  IN: 0 mL / OUT: 1150 mL / NET: -1150 mL      PHYSICAL EXAM:  GENERAL: NAD, well-developed  HEAD:  Atraumatic, Normocephalic  EYES: EOMI, PERRLA, conjunctiva and sclera clear  NECK: Supple, No JVD  CHEST/LUNG:BL wheeze BL  HEART: Regular rate and rhythm; No murmurs, rubs, or gallops  ABDOMEN: Soft, Nontender, Nondistended; Bowel sounds present  EXTREMITIES:  2+ Peripheral Pulses, No clubbing, cyanosis, or edema  PSYCH: AAOx3  NEUROLOGY: non-focal  SKIN: No rashes or lesions  Labs reviewed  Imaging reviewed independently and reviewed read  < from: US Kidney and Bladder (06.18.25 @ 18:27) >    IMPRESSION:  No hydronephrosis.    Grossly unchanged from CT scan performed one day prior.    < end of copied text >    Plan as above. DW resident. Agree to plan. Made edits  #Progress Note Handoff  Pending (specify):    Family discussion: house staff updated pt family  Disposition:   Decision to admit the pt is based on acuity as above

## 2025-06-19 NOTE — CONSULT NOTE ADULT - ASSESSMENT
79-year-old male with past medical history of HTN, MDD, schizophrenia, dementia presents to the ED for left lower quadrant abdominal pain associated shortness of breath and cough. pt states he has been having this sudden onset of SOB and cough with deep inspiration. States he is an active smoker. Denies any fever, chest pain, nausea, vomiting, diarrhea, dysuria, hemoptysis, palpitations, lightheadedness, Sick contacts.     · Temp at ED Arrival (C)	36.7 Degrees C  · O2 Delivery/Oxygen Delivery Method	room air  · SpO2 (%)	98 %  · Temp site	oral  · Temp (C)	36.7 Degrees C  · Temp (F)	98.1 Degrees F  · Respiration Rate (breaths/min)	18 /min  · Heart Rate	  114 /min  · BP Diastolic	  97 mm Hg  · BP Systolic	  200 mm Hg    CT Angio Chest PE Protocol w/ IV Cont (06.17.25 @ 12:33) >  Negative for pulmonary emboli    Emphysematous changes. Bibasilar atelectasis. Areas of ground glass attenuation which could reflect air trapping/airway disease.    CT abd/pelvis - unremarkable    ID consulted for diagnostic work up and antimicrobial treatment of bacteremia    IMPRESSION/RECOMMENDATIONS  Immunosuppression/Immunosenescence ( above age 60 yrs there is a exponential decline in immunity which could result in poor clinical outcomes.     HTN  MDD  Schizophrenia  dementia 79-year-old male with past medical history of HTN, MDD, schizophrenia, dementia presents to the ED for left lower quadrant abdominal pain associated shortness of breath and cough. pt states he has been having this sudden onset of SOB and cough with deep inspiration. States he is an active smoker. Denies any fever, chest pain, nausea, vomiting, diarrhea, dysuria, hemoptysis, palpitations, lightheadedness, Sick contacts.     ID consulted for diagnostic work up and antimicrobial treatment of bacteremia    IMPRESSION/RECOMMENDATIONS  Immunosuppression/Immunosenescence ( above age 60 yrs there is a exponential decline in immunity which could result in poor clinical outcomes.   Acute sharp abd pain : r/o vascular etiology  No PNA  NO Left sided PE  No VZV  Possibly diverticulitis with perforation  Lactate 4.2 > more suggestive of GI etiology  WBC 6.0>18.2  6/17 BCx c2/2 CoNS : not a true pathogen and will no treat  6/18 Nares ORSA NG  6/18 COVID 19/ Influenza/ RSV NG.       < from: CT Angio Chest PE Protocol w/ IV Cont (06.17.25 @ 12:33) >  Negative for pulmonary emboli  Emphysematous changes. Bibasilar atelectasis. Areas of groundglass attenuation which could reflect air trapping/airway disease.  < end of copied text >    < from: US Kidney and Bladder (06.18.25 @ 18:27) >  No hydronephrosis.    < from: Xray Chest 1 View- PORTABLE-Urgent (Xray Chest 1 View- PORTABLE-Urgent .) (06.17.25 @ 11:19) > ( Independent interpretation of test : no bacterial PNA  No radiographic evidence of acute cardiopulmonary disease.      HTN  MDD  Schizophrenia  dementia    -Off loading to prevent pressure sores and preventive measures to avoid aspiration  -obtain CT A/P with po/iv contrast  -obtain ECHO  -start Rocephin 2 gm iv q24h  -Flagyl 500 mg iv q8h  -duration/type/mode of ABx not clear at this point    Discussion of management/test results( independently interpretated by me ) /antibiotic regimen  with external/primary medical team.

## 2025-06-19 NOTE — RAPID RESPONSE TEAM SUMMARY - NSSITUATIONBACKGROUNDRRT_GEN_ALL_CORE
Pt admitted for URI? Rapid called for hypoxia prior to this providers shift, at CT scan to evaluate infection nidus iso Left lower lower quadrant pain and ID suspicious for a GI source. Pt reportedly had a desaturation episode to 80s, unable to verify pulse ox waveform, He was also reportedly in a lot of visible distress. He was given a push of solumedrol 125mg, CXR obtained was unchanged from prior. He was placed on HFNC, with improvement in his sats once back in the unit, ABG drawn unremarkable, with the exception of lactate of 2.7. This provider called Crit and approved the pt for trfr to SDU. STAT labs including blood cultures were drawn by US by this provider. Will Trial a push of lasix, although he does not appear overloaded. Called RT, will place him on continuos BiPAP. RN, Charge, pt intimated., All in agreement. Pt admitted for URI? Rapid called for hypoxia, at CT scan to evaluate for possible infection as per ID. Pt had a desaturation episode to 80s, unable to verify pulse ox waveform, He was also  in a lot of visible distress. He was given a push of solumedrol 125mg, CXR obtained was unchanged from prior. He was placed on HFNC, with improvement in his sats once back in the unit, ABG drawn unremarkable, with the exception of lactate of 2.7. This provider called Crit and approved the pt for trfr to SDU. STAT labs including blood cultures were drawn by US by this provider. Will Trial a push of lasix.  Patient stating he was anxious and possibly worsening his breathing, received his schizophrenia medications earlier. Called RT, will place him on continuos BiPAP. RN, Charge, pt intimated., All in agreement.

## 2025-06-19 NOTE — PHARMACOTHERAPY INTERVENTION NOTE - COMMENTS
Recommended metronidazole 500mg IV q8h as per ID consult recommendations.    Ronald Alvarado, PharmD, [facility]  Clinical Pharmacy Specialist, Infectious Diseases  Tele-Antimicrobial Stewardship Program (Tele-ASP)  Tele-ASP Phone: [phone]

## 2025-06-19 NOTE — PHARMACOTHERAPY INTERVENTION NOTE - COMMENTS
Patient is currently on vancomycin 1250mg IV q24h for the treatment of bacteremia which predicts a supratherapeutic steady state AUC/SOLEDAD of 618.13hr*mg/L (goal: 400-600) per [employer] vancomycin dosing software. Vancomycin level 6/19 11:21 was 10.6mg/L. Recommend dosing by level due to elevated SCr (2.1 > 2.2) and initiating vancomycin 1000mg IV x1 starting 6/19 22:00 and obtaining a repeat vancomycin level 6/20 11:00 to assist with further dosing. Patient is currently on vancomycin 1250mg IV q24h for the treatment of bacteremia which predicts a supratherapeutic steady state AUC/SOLEDAD of 618.13hr*mg/L (goal: 400-600) per [employer] vancomycin dosing software. Vancomycin level 6/19 11:21 was 10.6mg/L. Recommend dosing by level due to elevated SCr (2.1 > 2.2) and initiating vancomycin 1000mg IV x1 starting 6/19 22:00 which predicts a future 24h AUC of 417.87hr*mg/L and obtaining a repeat vancomycin level 6/20 11:00 to assist with further dosing.

## 2025-06-19 NOTE — PROGRESS NOTE ADULT - ASSESSMENT
79-year-old male with past medical history of HTN, MDD, schizophrenia, dementia presents to the ED for left lower quadrant abdominal pain associated shortness of breath and cough. pt states he has been having this sudden onset of SOB and cough with deep inspiration. States he is an active smoker. Vitals on admission significant for BP of 200/100 and 98% on 2L NC.     #URI - likely viral in patient w undiagnosed copd/emphysema - no sepsis poa  - CT w biapical pleural thickening w paraseptal emphysematous changes; bibasilar atelectasis; areas of ground-glass attenuation which could reflect air trapping/airway disease  - Increased Prednisone to 60mg BID today then 40mg BID starting tomorrow 06/20/25  - otherwise, nebs and inhalers, along w antitussives and decongestants prn  - outpatient pulm f/u for pft (no h/o copd but CT showing emphysematous  changes)  wean off oxygen  if worsening symptoms consult pulm    #HTN w urgency on admission, improved   home meds: norvasc, lisinopril, olmesartan  - f/u w outpatient provider regarding patient medication regimen as unsure why patient on both acei/arb  - would continue on ccb for now and resume arb once krystina resolves/returns to b/l    #KRYSTINA on CKD3 vs progressive CKD  - Cr 2.1 on admission - was 1.6 03/2024 > cont gentle hydration for now w repeat Cr in AM   will check renal bladder Us pending  daily labs     #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 follow procal. complete abx course if procal elevated. ceftriaxone dose given today.   also on azithromycin   follow cultures   monitor serum creatinine   if worsening hypoxia consult pulm . 79-year-old male with past medical history of HTN, MDD, schizophrenia, dementia presents to the ED for left lower quadrant abdominal pain associated shortness of breath and cough. pt states he has been having this sudden onset of SOB and cough with deep inspiration. States he is an active smoker. Vitals on admission significant for BP of 200/100 and 98% on 2L NC.     #Acute resp 2/2 suspected COpd/Asthma exacerbation 2/2 Acute bacterial Bronchitis   - CT w biapical pleural thickening w paraseptal emphysematous changes; bibasilar atelectasis; areas of ground-glass attenuation which could reflect air trapping/airway disease  - Increased Prednisone to Solumedrol 60mg BID today then 40mg BID starting tomorrow 06/20/25  - otherwise, nebs and inhalers, along w antitussives and decongestants prn  - outpatient pulm f/u for pft (no h/o copd but CT showing emphysematous  changes)  wean off oxygen  if worsening symptoms consult pulm  - Vanc and adwoa    #GP Bacteramia  -  ID consult  - repeat BC  - echo   - Vanco     #HTN w urgency on admission, improved   home meds: norvasc, lisinopril, olmesartan  - f/u w outpatient provider regarding patient medication regimen as unsure why patient on both acei/arb  - would continue on ccb for now and resume arb once krystina resolves/returns to b/l    #KRYSTINA on CKD3 vs progressive CKD  - Cr 2.1 on admission - was 1.6 03/2024 > cont gentle hydration for now w repeat Cr in AM   will check renal bladder Us pending  daily labs     #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 follow procal. complete abx course if procal elevated. ceftriaxone dose given today.   also on azithromycin   follow cultures   monitor serum creatinine   if worsening hypoxia consult pulm .

## 2025-06-19 NOTE — PROGRESS NOTE ADULT - SUBJECTIVE AND OBJECTIVE BOX
SUBJECTIVE/OVERNIGHT EVENTS  Today is hospital day 2d. This morning patient was seen and examined at bedside, resting comfortably in bed. No acute or major events overnight.    MEDICATIONS  STANDING MEDICATIONS  albuterol    90 MICROgram(s) HFA Inhaler 2 Puff(s) Inhalation two times a day  albuterol/ipratropium for Nebulization 3 milliLiter(s) Nebulizer every 6 hours  amLODIPine   Tablet 5 milliGRAM(s) Oral daily  atorvastatin 40 milliGRAM(s) Oral at bedtime  azithromycin  IVPB 500 milliGRAM(s) IV Intermittent every 24 hours  azithromycin  IVPB      buPROPion XL (24-Hour) . 150 milliGRAM(s) Oral daily  chlorhexidine 2% Cloths 1 Application(s) Topical <User Schedule>  fluPHENAZine 10 milliGRAM(s) Oral daily  folic acid 1 milliGRAM(s) Oral daily  guaiFENesin Oral Liquid (Sugar-Free) 100 milliGRAM(s) Oral every 6 hours  heparin   Injectable 5000 Unit(s) SubCutaneous every 8 hours  hydrALAZINE 25 milliGRAM(s) Oral three times a day  lactated ringers. 1000 milliLiter(s) IV Continuous <Continuous>  memantine 5 milliGRAM(s) Oral at bedtime  methylPREDNISolone sodium succinate Injectable 60 milliGRAM(s) IV Push at bedtime  nicotine -  14 mG/24Hr(s) Patch 1 Patch Transdermal daily  OLANZapine 20 milliGRAM(s) Oral at bedtime  pantoprazole    Tablet 40 milliGRAM(s) Oral before breakfast  thiamine 100 milliGRAM(s) Oral daily  vancomycin  IVPB 1250 milliGRAM(s) IV Intermittent every 24 hours    PRN MEDICATIONS  melatonin 3 milliGRAM(s) Oral at bedtime PRN  ondansetron Injectable 4 milliGRAM(s) IV Push every 8 hours PRN    VITALS  T(F): 98.1 (06-19-25 @ 05:00), Max: 98.4 (06-18-25 @ 19:27)  HR: 87 (06-19-25 @ 05:00) (86 - 88)  BP: 142/75 (06-19-25 @ 05:00) (125/70 - 148/72)  RR: 18 (06-19-25 @ 05:00) (18 - 18)  SpO2: 98% (06-19-25 @ 05:00) (92% - 98%)    PHYSICAL EXAM  GENERAL: NAD  HEART: RRR, S1, S2  LUNGS: Diffuse wheezing bilaterally  ABDOMEN: NTND  EXTREMITIES: No edema  NERVOUS SYSTEM: AO4  SKIN: No rash    LABS             14.1   18.24 )-----------( 201      ( 06-19-25 @ 07:55 )             43.7     141  |  109  |  29  -------------------------<  118   06-19-25 @ 07:55  4.5  |  21  |  2.2    Ca      8.8     06-19-25 @ 07:55    TPro  6.7  /  Alb  4.2  /  TBili  0.2  /  DBili  x   /  AST  15  /  ALT  14  /  AlkPhos  76  /  GGT  x     06-18-25 @ 08:16      Troponin T, High Sensitivity Result: 9 ng/L (06-17-25 @ 14:15)  Pro-Brain Natriuretic Peptide: <36 pg/mL (06-17-25 @ 09:47)  Troponin T, High Sensitivity Result: 11 ng/L (06-17-25 @ 09:47)    Urinalysis Basic - ( 19 Jun 2025 07:55 )    Color: x / Appearance: x / SG: x / pH: x  Gluc: 118 mg/dL / Ketone: x  / Bili: x / Urobili: x   Blood: x / Protein: x / Nitrite: x   Leuk Esterase: x / RBC: x / WBC x   Sq Epi: x / Non Sq Epi: x / Bacteria: x          Culture - Blood (collected 17 Jun 2025 20:43)  Source: Blood Blood-Peripheral  Gram Stain (19 Jun 2025 05:52):    Growth in anaerobic bottle: Gram Positive Cocci in Clusters  Preliminary Report (19 Jun 2025 05:52):    Growth in anaerobic bottle: Gram Positive Cocci in Clusters    Culture - Blood (collected 17 Jun 2025 20:34)  Source: Blood Blood-Peripheral  Gram Stain (19 Jun 2025 01:42):    Growth in anaerobic bottle: Gram Positive Cocci in Clusters  Preliminary Report (19 Jun 2025 01:42):    Growth in anaerobic bottle: Gram Positive Cocci in Clusters    Direct identification is available within approximately 3-5    hours either by Blood Panel Multiplexed PCR or Direct    MALDI-TOF. Details: [email or web address]  Organism: Blood Culture PCR (19 Jun 2025 07:35)  Organism: Blood Culture PCR (19 Jun 2025 07:35)    Urinalysis with Rflx Culture (collected 17 Jun 2025 09:26)      IMAGING

## 2025-06-19 NOTE — CONSULT NOTE ADULT - NS ATTEST RISK PROBLEM GEN_ALL_CORE FT
-My assessment required my independent history taking and time required is independent of the teaching service  -I independently interpreted the most recent imaging ( CXR ) and all available labs ( CBC, CMP) and cultures ( along with the sensitivities / SOLEDAD )  -Time excludes teaching time  -I reviewed all prior tests and documents  -I discussed my recommendations with the primary team housestaff/Attending  -I assisted with initiation of antibiotics

## 2025-06-19 NOTE — PHARMACOTHERAPY INTERVENTION NOTE - NSPHARMCOMMASP
ASP - Therapy recommended/ Alternative therapy
ASP - De-escalation
ASP - Therapy recommended/ Alternative therapy

## 2025-06-19 NOTE — PHARMACOTHERAPY INTERVENTION NOTE - COMMENTS
Recommend dosing vancomycin by level due to KRYSTINA (SCr 2.1 > 2.2), and obtaining a vancomycin level 6/19 11:00 before the 2nd dose to assist with further dosing.

## 2025-06-20 LAB
ALBUMIN SERPL ELPH-MCNC: 4.2 G/DL — SIGNIFICANT CHANGE UP (ref 3.5–5.2)
ALBUMIN SERPL ELPH-MCNC: 4.2 G/DL — SIGNIFICANT CHANGE UP (ref 3.5–5.2)
ALP SERPL-CCNC: 76 U/L — SIGNIFICANT CHANGE UP (ref 30–115)
ALP SERPL-CCNC: 82 U/L — SIGNIFICANT CHANGE UP (ref 30–115)
ALT FLD-CCNC: 21 U/L — SIGNIFICANT CHANGE UP (ref 0–41)
ALT FLD-CCNC: 24 U/L — SIGNIFICANT CHANGE UP (ref 0–41)
ANION GAP SERPL CALC-SCNC: 13 MMOL/L — SIGNIFICANT CHANGE UP (ref 7–14)
ANION GAP SERPL CALC-SCNC: 14 MMOL/L — SIGNIFICANT CHANGE UP (ref 7–14)
AST SERPL-CCNC: 20 U/L — SIGNIFICANT CHANGE UP (ref 0–41)
AST SERPL-CCNC: 22 U/L — SIGNIFICANT CHANGE UP (ref 0–41)
BASE EXCESS BLDA CALC-SCNC: -4.5 MMOL/L — LOW (ref -2–3)
BASOPHILS # BLD AUTO: 0.02 K/UL — SIGNIFICANT CHANGE UP (ref 0–0.2)
BASOPHILS NFR BLD AUTO: 0.1 % — SIGNIFICANT CHANGE UP (ref 0–1)
BILIRUB SERPL-MCNC: 0.3 MG/DL — SIGNIFICANT CHANGE UP (ref 0.2–1.2)
BILIRUB SERPL-MCNC: 0.3 MG/DL — SIGNIFICANT CHANGE UP (ref 0.2–1.2)
BUN SERPL-MCNC: 30 MG/DL — HIGH (ref 10–20)
BUN SERPL-MCNC: 36 MG/DL — HIGH (ref 10–20)
CALCIUM SERPL-MCNC: 8.7 MG/DL — SIGNIFICANT CHANGE UP (ref 8.4–10.5)
CALCIUM SERPL-MCNC: 8.8 MG/DL — SIGNIFICANT CHANGE UP (ref 8.4–10.5)
CHLORIDE SERPL-SCNC: 100 MMOL/L — SIGNIFICANT CHANGE UP (ref 98–110)
CHLORIDE SERPL-SCNC: 99 MMOL/L — SIGNIFICANT CHANGE UP (ref 98–110)
CO2 BLDA-SCNC: 20 MMOL/L — SIGNIFICANT CHANGE UP (ref 19–24)
CO2 SERPL-SCNC: 19 MMOL/L — SIGNIFICANT CHANGE UP (ref 17–32)
CO2 SERPL-SCNC: 19 MMOL/L — SIGNIFICANT CHANGE UP (ref 17–32)
CREAT SERPL-MCNC: 2.2 MG/DL — HIGH (ref 0.7–1.5)
CREAT SERPL-MCNC: 2.2 MG/DL — HIGH (ref 0.7–1.5)
EGFR: 30 ML/MIN/1.73M2 — LOW
EOSINOPHIL # BLD AUTO: 0 K/UL — SIGNIFICANT CHANGE UP (ref 0–0.7)
EOSINOPHIL NFR BLD AUTO: 0 % — SIGNIFICANT CHANGE UP (ref 0–8)
GAS PNL BLDA: SIGNIFICANT CHANGE UP
GAS PNL BLDA: SIGNIFICANT CHANGE UP
GLUCOSE SERPL-MCNC: 139 MG/DL — HIGH (ref 70–99)
GLUCOSE SERPL-MCNC: 165 MG/DL — HIGH (ref 70–99)
HCO3 BLDA-SCNC: 19 MMOL/L — LOW (ref 21–28)
HCT VFR BLD CALC: 42.6 % — SIGNIFICANT CHANGE UP (ref 42–52)
HGB BLD-MCNC: 14 G/DL — SIGNIFICANT CHANGE UP (ref 14–18)
HOROWITZ INDEX BLDA+IHG-RTO: 50 — SIGNIFICANT CHANGE UP
IMM GRANULOCYTES NFR BLD AUTO: 1.3 % — HIGH (ref 0.1–0.3)
LACTATE SERPL-SCNC: 2.8 MMOL/L — HIGH (ref 0.7–2)
LACTATE SERPL-SCNC: 3 MMOL/L — HIGH (ref 0.7–2)
LACTATE SERPL-SCNC: 4.6 MMOL/L — CRITICAL HIGH (ref 0.7–2)
LYMPHOCYTES # BLD AUTO: 0.76 K/UL — LOW (ref 1.2–3.4)
LYMPHOCYTES # BLD AUTO: 5.1 % — LOW (ref 20.5–51.1)
MAGNESIUM SERPL-MCNC: 1.8 MG/DL — SIGNIFICANT CHANGE UP (ref 1.8–2.4)
MCHC RBC-ENTMCNC: 29.7 PG — SIGNIFICANT CHANGE UP (ref 27–31)
MCHC RBC-ENTMCNC: 32.9 G/DL — SIGNIFICANT CHANGE UP (ref 32–37)
MCV RBC AUTO: 90.3 FL — SIGNIFICANT CHANGE UP (ref 80–94)
MONOCYTES # BLD AUTO: 0.61 K/UL — HIGH (ref 0.1–0.6)
MONOCYTES NFR BLD AUTO: 4.1 % — SIGNIFICANT CHANGE UP (ref 1.7–9.3)
NEUTROPHILS # BLD AUTO: 13.28 K/UL — HIGH (ref 1.4–6.5)
NEUTROPHILS NFR BLD AUTO: 89.4 % — HIGH (ref 42.2–75.2)
NRBC BLD AUTO-RTO: 0 /100 WBCS — SIGNIFICANT CHANGE UP (ref 0–0)
PCO2 BLDA: 32 MMHG — LOW (ref 35–48)
PH BLDA: 7.39 — SIGNIFICANT CHANGE UP (ref 7.35–7.45)
PHOSPHATE SERPL-MCNC: 3.8 MG/DL — SIGNIFICANT CHANGE UP (ref 2.1–4.9)
PLATELET # BLD AUTO: 211 K/UL — SIGNIFICANT CHANGE UP (ref 130–400)
PMV BLD: 11.2 FL — HIGH (ref 7.4–10.4)
PO2 BLDA: 197 MMHG — HIGH (ref 83–108)
POTASSIUM SERPL-MCNC: 5 MMOL/L — SIGNIFICANT CHANGE UP (ref 3.5–5)
POTASSIUM SERPL-MCNC: 5 MMOL/L — SIGNIFICANT CHANGE UP (ref 3.5–5)
POTASSIUM SERPL-SCNC: 5 MMOL/L — SIGNIFICANT CHANGE UP (ref 3.5–5)
POTASSIUM SERPL-SCNC: 5 MMOL/L — SIGNIFICANT CHANGE UP (ref 3.5–5)
PROT SERPL-MCNC: 6.8 G/DL — SIGNIFICANT CHANGE UP (ref 6–8)
PROT SERPL-MCNC: 7.5 G/DL — SIGNIFICANT CHANGE UP (ref 6–8)
RBC # BLD: 4.72 M/UL — SIGNIFICANT CHANGE UP (ref 4.7–6.1)
RBC # FLD: 14.2 % — SIGNIFICANT CHANGE UP (ref 11.5–14.5)
SAO2 % BLDA: 99 % — HIGH (ref 94–98)
SODIUM SERPL-SCNC: 132 MMOL/L — LOW (ref 135–146)
SODIUM SERPL-SCNC: 132 MMOL/L — LOW (ref 135–146)
TSH SERPL-MCNC: 0.33 UIU/ML — SIGNIFICANT CHANGE UP (ref 0.27–4.2)
WBC # BLD: 14.86 K/UL — HIGH (ref 4.8–10.8)
WBC # FLD AUTO: 14.86 K/UL — HIGH (ref 4.8–10.8)

## 2025-06-20 PROCEDURE — 71045 X-RAY EXAM CHEST 1 VIEW: CPT | Mod: 26,77

## 2025-06-20 PROCEDURE — 99233 SBSQ HOSP IP/OBS HIGH 50: CPT

## 2025-06-20 PROCEDURE — 99291 CRITICAL CARE FIRST HOUR: CPT

## 2025-06-20 PROCEDURE — 93010 ELECTROCARDIOGRAM REPORT: CPT

## 2025-06-20 PROCEDURE — 74018 RADEX ABDOMEN 1 VIEW: CPT | Mod: 26,76

## 2025-06-20 PROCEDURE — 71045 X-RAY EXAM CHEST 1 VIEW: CPT | Mod: 26

## 2025-06-20 PROCEDURE — 99222 1ST HOSP IP/OBS MODERATE 55: CPT

## 2025-06-20 RX ORDER — METOCLOPRAMIDE HCL 10 MG
10 TABLET ORAL THREE TIMES A DAY
Refills: 0 | Status: DISCONTINUED | OUTPATIENT
Start: 2025-06-20 | End: 2025-06-21

## 2025-06-20 RX ORDER — DIATRIZOATE MEGLUMINE, SODIUM 66 %-10 %
30 VIAL (ML) INJECTION ONCE
Refills: 0 | Status: DISCONTINUED | OUTPATIENT
Start: 2025-06-20 | End: 2025-06-23

## 2025-06-20 RX ORDER — AMLODIPINE BESYLATE 10 MG/1
10 TABLET ORAL DAILY
Refills: 0 | Status: DISCONTINUED | OUTPATIENT
Start: 2025-06-21 | End: 2025-07-01

## 2025-06-20 RX ORDER — AMLODIPINE BESYLATE 10 MG/1
5 TABLET ORAL ONCE
Refills: 0 | Status: COMPLETED | OUTPATIENT
Start: 2025-06-20 | End: 2025-06-20

## 2025-06-20 RX ORDER — LACTULOSE 10 G/15ML
30 SOLUTION ORAL ONCE
Refills: 0 | Status: COMPLETED | OUTPATIENT
Start: 2025-06-20 | End: 2025-06-20

## 2025-06-20 RX ORDER — IPRATROPIUM BROMIDE AND ALBUTEROL SULFATE .5; 2.5 MG/3ML; MG/3ML
3 SOLUTION RESPIRATORY (INHALATION) EVERY 6 HOURS
Refills: 0 | Status: DISCONTINUED | OUTPATIENT
Start: 2025-06-20 | End: 2025-06-21

## 2025-06-20 RX ORDER — SENNA 187 MG
2 TABLET ORAL AT BEDTIME
Refills: 0 | Status: DISCONTINUED | OUTPATIENT
Start: 2025-06-20 | End: 2025-07-01

## 2025-06-20 RX ORDER — LORAZEPAM 4 MG/ML
0.5 VIAL (ML) INJECTION ONCE
Refills: 0 | Status: DISCONTINUED | OUTPATIENT
Start: 2025-06-20 | End: 2025-06-20

## 2025-06-20 RX ORDER — LORAZEPAM 4 MG/ML
0.5 VIAL (ML) INJECTION EVERY 8 HOURS
Refills: 0 | Status: DISCONTINUED | OUTPATIENT
Start: 2025-06-20 | End: 2025-06-25

## 2025-06-20 RX ORDER — LABETALOL HYDROCHLORIDE 200 MG/1
10 TABLET, FILM COATED ORAL ONCE
Refills: 0 | Status: COMPLETED | OUTPATIENT
Start: 2025-06-20 | End: 2025-06-20

## 2025-06-20 RX ORDER — ACETAMINOPHEN 500 MG/5ML
650 LIQUID (ML) ORAL EVERY 6 HOURS
Refills: 0 | Status: DISCONTINUED | OUTPATIENT
Start: 2025-06-20 | End: 2025-06-24

## 2025-06-20 RX ORDER — POLYETHYLENE GLYCOL 3350 17 G/17G
17 POWDER, FOR SOLUTION ORAL
Refills: 0 | Status: DISCONTINUED | OUTPATIENT
Start: 2025-06-20 | End: 2025-07-01

## 2025-06-20 RX ORDER — SODIUM CHLORIDE 9 G/1000ML
500 INJECTION, SOLUTION INTRAVENOUS
Refills: 0 | Status: DISCONTINUED | OUTPATIENT
Start: 2025-06-20 | End: 2025-06-21

## 2025-06-20 RX ADMIN — Medication 40 MILLIGRAM(S): at 05:41

## 2025-06-20 RX ADMIN — Medication 2 TABLET(S): at 22:00

## 2025-06-20 RX ADMIN — HEPARIN SODIUM 5000 UNIT(S): 1000 INJECTION INTRAVENOUS; SUBCUTANEOUS at 05:40

## 2025-06-20 RX ADMIN — DEXTROMETHORPHAN HBR, GUAIFENESIN 100 MILLIGRAM(S): 200 LIQUID ORAL at 11:39

## 2025-06-20 RX ADMIN — Medication 4 MILLIGRAM(S): at 12:07

## 2025-06-20 RX ADMIN — AMLODIPINE BESYLATE 5 MILLIGRAM(S): 10 TABLET ORAL at 11:39

## 2025-06-20 RX ADMIN — IPRATROPIUM BROMIDE AND ALBUTEROL SULFATE 3 MILLILITER(S): .5; 2.5 SOLUTION RESPIRATORY (INHALATION) at 08:06

## 2025-06-20 RX ADMIN — NICOTINE POLACRILEX 1 PATCH: 4 GUM, CHEWING ORAL at 12:00

## 2025-06-20 RX ADMIN — Medication 10 MILLIGRAM(S): at 12:07

## 2025-06-20 RX ADMIN — IPRATROPIUM BROMIDE AND ALBUTEROL SULFATE 3 MILLILITER(S): .5; 2.5 SOLUTION RESPIRATORY (INHALATION) at 01:28

## 2025-06-20 RX ADMIN — Medication 1 APPLICATION(S): at 05:41

## 2025-06-20 RX ADMIN — POLYETHYLENE GLYCOL 3350 17 GRAM(S): 17 POWDER, FOR SOLUTION ORAL at 10:14

## 2025-06-20 RX ADMIN — AMLODIPINE BESYLATE 5 MILLIGRAM(S): 10 TABLET ORAL at 05:41

## 2025-06-20 RX ADMIN — LABETALOL HYDROCHLORIDE 10 MILLIGRAM(S): 200 TABLET, FILM COATED ORAL at 11:34

## 2025-06-20 RX ADMIN — ATORVASTATIN CALCIUM 40 MILLIGRAM(S): 80 TABLET, FILM COATED ORAL at 22:00

## 2025-06-20 RX ADMIN — Medication 0.5 MILLIGRAM(S): at 13:31

## 2025-06-20 RX ADMIN — IPRATROPIUM BROMIDE AND ALBUTEROL SULFATE 3 MILLILITER(S): .5; 2.5 SOLUTION RESPIRATORY (INHALATION) at 12:59

## 2025-06-20 RX ADMIN — SODIUM CHLORIDE 100 MILLILITER(S): 9 INJECTION, SOLUTION INTRAVENOUS at 10:15

## 2025-06-20 RX ADMIN — CEFTRIAXONE 100 MILLIGRAM(S): 500 INJECTION, POWDER, FOR SOLUTION INTRAMUSCULAR; INTRAVENOUS at 18:07

## 2025-06-20 RX ADMIN — HEPARIN SODIUM 5000 UNIT(S): 1000 INJECTION INTRAVENOUS; SUBCUTANEOUS at 22:00

## 2025-06-20 RX ADMIN — SODIUM CHLORIDE 1000 MILLILITER(S): 9 INJECTION, SOLUTION INTRAVENOUS at 07:03

## 2025-06-20 RX ADMIN — NICOTINE POLACRILEX 1 PATCH: 4 GUM, CHEWING ORAL at 20:04

## 2025-06-20 RX ADMIN — METHYLPREDNISOLONE ACETATE 40 MILLIGRAM(S): 80 INJECTION, SUSPENSION INTRA-ARTICULAR; INTRALESIONAL; INTRAMUSCULAR; SOFT TISSUE at 05:40

## 2025-06-20 RX ADMIN — BUPROPION HYDROBROMIDE 150 MILLIGRAM(S): 522 TABLET, EXTENDED RELEASE ORAL at 11:33

## 2025-06-20 RX ADMIN — Medication 100 MILLIGRAM(S): at 13:33

## 2025-06-20 RX ADMIN — DEXTROMETHORPHAN HBR, GUAIFENESIN 100 MILLIGRAM(S): 200 LIQUID ORAL at 05:39

## 2025-06-20 RX ADMIN — METHYLPREDNISOLONE ACETATE 40 MILLIGRAM(S): 80 INJECTION, SUSPENSION INTRA-ARTICULAR; INTRALESIONAL; INTRAMUSCULAR; SOFT TISSUE at 18:08

## 2025-06-20 RX ADMIN — NICOTINE POLACRILEX 1 PATCH: 4 GUM, CHEWING ORAL at 11:33

## 2025-06-20 RX ADMIN — LACTULOSE 30 GRAM(S): 10 SOLUTION ORAL at 10:14

## 2025-06-20 RX ADMIN — Medication 25 MILLIGRAM(S): at 05:41

## 2025-06-20 RX ADMIN — Medication 10 MILLIGRAM(S): at 22:00

## 2025-06-20 RX ADMIN — Medication 100 MILLIGRAM(S): at 05:43

## 2025-06-20 RX ADMIN — NICOTINE POLACRILEX 1 PATCH: 4 GUM, CHEWING ORAL at 08:00

## 2025-06-20 RX ADMIN — Medication 100 MILLIGRAM(S): at 11:34

## 2025-06-20 RX ADMIN — Medication 100 MILLIGRAM(S): at 21:59

## 2025-06-20 RX ADMIN — FOLIC ACID 1 MILLIGRAM(S): 1 TABLET ORAL at 11:34

## 2025-06-20 RX ADMIN — HEPARIN SODIUM 5000 UNIT(S): 1000 INJECTION INTRAVENOUS; SUBCUTANEOUS at 13:32

## 2025-06-20 NOTE — PROGRESS NOTE ADULT - SUBJECTIVE AND OBJECTIVE BOX
24H events:    Today is hospital day 3d. This morning patient was seen and examined at bedside, resting comfortably in bed.    No acute or major events overnight.    PAST MEDICAL & SURGICAL HISTORY  Schizophrenia    No significant past surgical history        SOCIAL HISTORY:  Social History:      ALLERGIES:  No Known Allergies      MEDICATIONS:  STANDING MEDICATIONS  albuterol    90 MICROgram(s) HFA Inhaler 2 Puff(s) Inhalation two times a day  amLODIPine   Tablet 5 milliGRAM(s) Oral daily  atorvastatin 40 milliGRAM(s) Oral at bedtime  buPROPion XL (24-Hour) . 150 milliGRAM(s) Oral daily  cefTRIAXone   IVPB 2000 milliGRAM(s) IV Intermittent every 24 hours  chlorhexidine 2% Cloths 1 Application(s) Topical <User Schedule>  fluPHENAZine 10 milliGRAM(s) Oral daily  fluticasone propionate/ salmeterol 500-50 MICROgram(s) Diskus 1 Dose(s) Inhalation two times a day  folic acid 1 milliGRAM(s) Oral daily  guaiFENesin Oral Liquid (Sugar-Free) 100 milliGRAM(s) Oral every 6 hours  heparin   Injectable 5000 Unit(s) SubCutaneous every 8 hours  hydrALAZINE 25 milliGRAM(s) Oral three times a day  hydrALAZINE 25 milliGRAM(s) Oral once  lactated ringers. 500 milliLiter(s) IV Continuous <Continuous>  lactulose Syrup 30 Gram(s) Oral once  memantine 5 milliGRAM(s) Oral at bedtime  methylPREDNISolone sodium succinate Injectable 40 milliGRAM(s) IV Push every 12 hours  metroNIDAZOLE  IVPB 500 milliGRAM(s) IV Intermittent every 8 hours  nicotine -  14 mG/24Hr(s) Patch 1 Patch Transdermal daily  OLANZapine 20 milliGRAM(s) Oral at bedtime  pantoprazole    Tablet 40 milliGRAM(s) Oral before breakfast  polyethylene glycol 3350 17 Gram(s) Oral two times a day  senna 2 Tablet(s) Oral at bedtime  thiamine 100 milliGRAM(s) Oral daily    PRN MEDICATIONS  albuterol/ipratropium for Nebulization 3 milliLiter(s) Nebulizer every 6 hours PRN  melatonin 3 milliGRAM(s) Oral at bedtime PRN  metoclopramide Injectable 10 milliGRAM(s) IV Push three times a day PRN  ondansetron Injectable 4 milliGRAM(s) IV Push every 8 hours PRN      VITALS:   T(C): 36.9 (06-20-25 @ 08:00), Max: 37.2 (06-19-25 @ 20:55)  HR: 103 (06-20-25 @ 08:00) (82 - 104)  BP: 169/77 (06-20-25 @ 08:00) (130/76 - 175/76)  RR: 23 (06-20-25 @ 08:00) (15 - 26)  SpO2: 95% (06-20-25 @ 08:12) (94% - 100%)  I&O's Summary        PHYSICAL EXAM:  GENERAL: well built, well nourished  HEAD:  Atraumatic, Normocephalic  EYES: EOMI, PERRLA, conjunctiva and sclera clear  NECK: Supple, No JVD  NERVOUS SYSTEM:  Alert & Oriented X3,  motor and  sensory intact  CHEST/LUNG: B/L good air entry; No rales, rhonchi, or wheezing  HEART: S1S2 normal, no S3, Regular rate and rhythm; No murmurs heard  ABDOMEN: Soft, Nontender, Nondistended; Bowel sounds present  EXTREMITIES:  2+ Peripheral Pulses, No edema  SKIN: No rashes or lesions    LABS:                        14.1   18.25 )-----------( 223      ( 19 Jun 2025 22:30 )             42.5     06-19    132[L]  |  99  |  30[H]  ----------------------------<  139[H]  5.0   |  19  |  2.2[H]    Ca    8.8      19 Jun 2025 22:30  Phos  4.7     06-19  Mg     1.8     06-19    TPro  7.5  /  Alb  4.2  /  TBili  0.3  /  DBili  x   /  AST  22  /  ALT  24  /  AlkPhos  82  06-19    PT/INR - ( 19 Jun 2025 22:29 )   PT: 10.70 sec;   INR: 0.91 ratio         PTT - ( 19 Jun 2025 22:29 )  PTT:25.5 sec  Urinalysis Basic - ( 19 Jun 2025 22:30 )    Color: x / Appearance: x / SG: x / pH: x  Gluc: 139 mg/dL / Ketone: x  / Bili: x / Urobili: x   Blood: x / Protein: x / Nitrite: x   Leuk Esterase: x / RBC: x / WBC x   Sq Epi: x / Non Sq Epi: x / Bacteria: x      ABG - ( 20 Jun 2025 00:16 )  pH, Arterial: 7.39  pH, Blood: x     /  pCO2: 32    /  pO2: 197   / HCO3: 19    / Base Excess: -4.5  /  SaO2: 99.0              Lactate, Blood: 4.6 mmol/L *HH* (06-20-25 @ 05:31)  Lactate, Blood: 2.8 mmol/L *H* (06-19-25 @ 22:30)      Culture - Blood (collected 17 Jun 2025 20:43)  Source: Blood Blood-Peripheral  Gram Stain (19 Jun 2025 05:52):    Growth in anaerobic bottle: Gram Positive Cocci in Clusters  Preliminary Report (19 Jun 2025 05:52):    Growth in anaerobic bottle: Gram Positive Cocci in Clusters    Culture - Blood (collected 17 Jun 2025 20:34)  Source: Blood Blood-Peripheral  Gram Stain (19 Jun 2025 01:42):    Growth in anaerobic bottle: Gram Positive Cocci in Clusters  Final Report (19 Jun 2025 21:53):    Growth in anaerobic bottle: Staphylococcus epidermidis    Isolation of Coagulase negative Staphylococcus from single blood culture    sets may represent    contamination. Contact the Microbiology Department at [phone] if    susceptibility testing is needed.    clinically indicated.    Direct identification is available within approximately 3-5    hours either by Blood Panel Multiplexed PCR or Direct    MALDI-TOF. Details: [email or web address]  Organism: Blood Culture PCR (19 Jun 2025 21:53)  Organism: Blood Culture PCR (19 Jun 2025 21:53)               24H events:    Today is hospital day 3d. This morning patient was seen and examined at bedside, resting comfortably in bed.    Rapid called for hypoxia last night, at CT scan to evaluate for possible infection as per ID. Pt had a desaturation episode to 80s, unable to verify pulse ox waveform, He was also  in a lot of visible distress. He was given a push of solumedrol 125mg, CXR obtained was unchanged from prior. He was placed on HFNC, with improvement in his sats once back in the unit, ABG drawn unremarkable, with the exception of lactate of 2.7. This provider called Crit and approved the pt for trfr to SDU.    Today patient had to be evaluated by Respiratory therapy due to worsening respiratory distress and coughing, he was given meds and placed on Bipap. Patient vomitted several times, has been made NPO.    PAST MEDICAL & SURGICAL HISTORY  Schizophrenia    No significant past surgical history        SOCIAL HISTORY:  Social History:      ALLERGIES:  No Known Allergies      MEDICATIONS:  STANDING MEDICATIONS  albuterol    90 MICROgram(s) HFA Inhaler 2 Puff(s) Inhalation two times a day  amLODIPine   Tablet 5 milliGRAM(s) Oral daily  atorvastatin 40 milliGRAM(s) Oral at bedtime  buPROPion XL (24-Hour) . 150 milliGRAM(s) Oral daily  cefTRIAXone   IVPB 2000 milliGRAM(s) IV Intermittent every 24 hours  chlorhexidine 2% Cloths 1 Application(s) Topical <User Schedule>  fluPHENAZine 10 milliGRAM(s) Oral daily  fluticasone propionate/ salmeterol 500-50 MICROgram(s) Diskus 1 Dose(s) Inhalation two times a day  folic acid 1 milliGRAM(s) Oral daily  guaiFENesin Oral Liquid (Sugar-Free) 100 milliGRAM(s) Oral every 6 hours  heparin   Injectable 5000 Unit(s) SubCutaneous every 8 hours  hydrALAZINE 25 milliGRAM(s) Oral three times a day  hydrALAZINE 25 milliGRAM(s) Oral once  lactated ringers. 500 milliLiter(s) IV Continuous <Continuous>  lactulose Syrup 30 Gram(s) Oral once  memantine 5 milliGRAM(s) Oral at bedtime  methylPREDNISolone sodium succinate Injectable 40 milliGRAM(s) IV Push every 12 hours  metroNIDAZOLE  IVPB 500 milliGRAM(s) IV Intermittent every 8 hours  nicotine -  14 mG/24Hr(s) Patch 1 Patch Transdermal daily  OLANZapine 20 milliGRAM(s) Oral at bedtime  pantoprazole    Tablet 40 milliGRAM(s) Oral before breakfast  polyethylene glycol 3350 17 Gram(s) Oral two times a day  senna 2 Tablet(s) Oral at bedtime  thiamine 100 milliGRAM(s) Oral daily    PRN MEDICATIONS  albuterol/ipratropium for Nebulization 3 milliLiter(s) Nebulizer every 6 hours PRN  melatonin 3 milliGRAM(s) Oral at bedtime PRN  metoclopramide Injectable 10 milliGRAM(s) IV Push three times a day PRN  ondansetron Injectable 4 milliGRAM(s) IV Push every 8 hours PRN      VITALS:   T(C): 36.9 (06-20-25 @ 08:00), Max: 37.2 (06-19-25 @ 20:55)  HR: 103 (06-20-25 @ 08:00) (82 - 104)  BP: 169/77 (06-20-25 @ 08:00) (130/76 - 175/76)  RR: 23 (06-20-25 @ 08:00) (15 - 26)  SpO2: 95% (06-20-25 @ 08:12) (94% - 100%)  I&O's Summary        PHYSICAL EXAM:  GENERAL: No acute distress  HEAD:  Atraumatic, Normocephalic  EYES: EOMI, PERRLA, conjunctiva and sclera clear  NECK: Supple, No JVD  NERVOUS SYSTEM:  Alert & Oriented X3, endorses auditory hallucinations  CHEST/LUNG: B/L good air entry; diffuse rhonchi. Increased work of breathing.  HEART: S1S2 normal, no S3, Regular rate and rhythm; No murmurs heard  ABDOMEN: Left sided sharp flank pain, worsened by palpation and speaking. Distended. Nausea and vomiting  EXTREMITIES:  2+ Peripheral Pulses, No edema  SKIN: No rashes or lesions    LABS:                        14.1   18.25 )-----------( 223      ( 19 Jun 2025 22:30 )             42.5     06-19    132[L]  |  99  |  30[H]  ----------------------------<  139[H]  5.0   |  19  |  2.2[H]    Ca    8.8      19 Jun 2025 22:30  Phos  4.7     06-19  Mg     1.8     06-19    TPro  7.5  /  Alb  4.2  /  TBili  0.3  /  DBili  x   /  AST  22  /  ALT  24  /  AlkPhos  82  06-19    PT/INR - ( 19 Jun 2025 22:29 )   PT: 10.70 sec;   INR: 0.91 ratio         PTT - ( 19 Jun 2025 22:29 )  PTT:25.5 sec  Urinalysis Basic - ( 19 Jun 2025 22:30 )    Color: x / Appearance: x / SG: x / pH: x  Gluc: 139 mg/dL / Ketone: x  / Bili: x / Urobili: x   Blood: x / Protein: x / Nitrite: x   Leuk Esterase: x / RBC: x / WBC x   Sq Epi: x / Non Sq Epi: x / Bacteria: x      ABG - ( 20 Jun 2025 00:16 )  pH, Arterial: 7.39  pH, Blood: x     /  pCO2: 32    /  pO2: 197   / HCO3: 19    / Base Excess: -4.5  /  SaO2: 99.0              Lactate, Blood: 4.6 mmol/L *HH* (06-20-25 @ 05:31)  Lactate, Blood: 2.8 mmol/L *H* (06-19-25 @ 22:30)      Culture - Blood (collected 17 Jun 2025 20:43)  Source: Blood Blood-Peripheral  Gram Stain (19 Jun 2025 05:52):    Growth in anaerobic bottle: Gram Positive Cocci in Clusters  Preliminary Report (19 Jun 2025 05:52):    Growth in anaerobic bottle: Gram Positive Cocci in Clusters    Culture - Blood (collected 17 Jun 2025 20:34)  Source: Blood Blood-Peripheral  Gram Stain (19 Jun 2025 01:42):    Growth in anaerobic bottle: Gram Positive Cocci in Clusters  Final Report (19 Jun 2025 21:53):    Growth in anaerobic bottle: Staphylococcus epidermidis    Isolation of Coagulase negative Staphylococcus from single blood culture    sets may represent    contamination. Contact the Microbiology Department at [phone] if    susceptibility testing is needed.    clinically indicated.    Direct identification is available within approximately 3-5    hours either by Blood Panel Multiplexed PCR or Direct    MALDI-TOF. Details: [email or web address]  Organism: Blood Culture PCR (19 Jun 2025 21:53)  Organism: Blood Culture PCR (19 Jun 2025 21:53)

## 2025-06-20 NOTE — PROGRESS NOTE ADULT - ASSESSMENT
79-year-old male with past medical history of HTN, MDD, schizophrenia, dementia presents to the ED for left lower quadrant abdominal pain associated shortness of breath and cough. pt states he has been having this sudden onset of SOB and cough with deep inspiration. States he is an active smoker. Vitals on admission significant for BP of 200/100 and 98% on 2L NC.     #Acute resp 2/2 suspected COpd/Asthma exacerbation 2/2 Acute bacterial Bronchitis   - CT w biapical pleural thickening w paraseptal emphysematous changes; bibasilar atelectasis; areas of ground-glass attenuation which could reflect air trapping/airway disease  - Increased Prednisone to Solumedrol 60mg BID today then 40mg BID starting tomorrow 06/20/25  - otherwise, nebs and inhalers, along w antitussives and decongestants prn  - outpatient pulm f/u for pft (no h/o copd but CT showing emphysematous  changes)  wean off oxygen  if worsening symptoms consult pulm  - Vanc and azyzthro    #GP Bacteramia  -  ID consult  - repeat BC  - echo   - Vanco     #HTN w urgency on admission, improved   home meds: norvasc, lisinopril, olmesartan  - f/u w outpatient provider regarding patient medication regimen as unsure why patient on both acei/arb  - would continue on ccb for now and resume arb once krystina resolves/returns to b/l    #KRYSTINA on CKD3 vs progressive CKD  - Cr 2.1 on admission - was 1.6 03/2024 > cont gentle hydration for now w repeat Cr in AM   will check renal bladder Us pending  daily labs     #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 follow procal. complete abx course if procal elevated. ceftriaxone dose given today.   also on azithromycin   follow cultures   monitor serum creatinine   if worsening hypoxia consult pulm .    #MISC  -DVT ppx:   -GI ppx:   -DIET:   -Activity: AAT  -Code Status: Full   -Pending:   -DIspo:    79-year-old male with past medical history of HTN, MDD, schizophrenia, dementia presents to the ED for left lower quadrant abdominal pain associated shortness of breath and cough. pt states he has been having this sudden onset of SOB and cough with deep inspiration. States he is an active smoker. Vitals on admission significant for BP of 200/100 and 98% on 2L NC.     #Acute resp 2/2 suspected COpd/Asthma exacerbation 2/2 Acute bacterial Bronchitis   - CT w biapical pleural thickening w paraseptal emphysematous changes; bibasilar atelectasis; areas of ground-glass attenuation which could reflect air trapping/airway disease  - Increased Prednisone to Solumedrol 60mg BID today then 40mg BID starting tomorrow 06/20/25  - initiate bipap  - outpatient pulm f/u for pft (no h/o copd but CT showing emphysematous  changes)  wean off oxygen  if worsening symptoms consult pulm  - Vanc and azyzthro    #Left sided flank pain  - surgery consulted    #GP Bacteramia  -  ID consult  - repeat BC  - echo   - Vanco     #HTN w urgency on admission, improved   home meds: norvasc, lisinopril, olmesartan  - initiate amlodipine, hydralizine  - f/u w outpatient provider regarding patient medication regimen as unsure why patient on both acei/arb  - would continue on ccb for now and resume arb once krystina resolves/returns to b/l    #KRYSTINA on CKD3 vs progressive CKD  - Cr 2.1 on admission - was 1.6 03/2024 > cont gentle hydration for now w repeat Cr in AM   will check renal bladder Us pending  daily labs     #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 follow procal. complete abx course if procal elevated. ceftriaxone dose given today.   also on azithromycin   follow cultures   monitor serum creatinine   if worsening hypoxia consult pulm .    #MISC  -DVT ppx:   -GI ppx:   -DIET:   -Activity: AAT  -Code Status: Full   -Pending:   -DIspo:

## 2025-06-20 NOTE — CONSULT NOTE ADULT - ASSESSMENT
IMPRESSION:    Acute hypoxemic resp failure on HHFNC/ NIV  Possible Aspiration event  COPD exacerbation  Abd pain CT AP on admission noted  Acute on chronic renal failure  Lactic acidosis improved  HO HTN, MDD, schizophrenia, dementia     PLAN:    CNS: Avoid CNS depressant, keep OP meds    HEENT:  Oral care    PULMONARY:  HOB @ 45 degrees, aspiration precaution, HHFNC/ NIV keep SaO2 92 TO 96%, steroids, Neb repeat CXR    CARDIOVASCULAR: Echo noted, BP control, sp lasix, keep equal balance    GI: GI prophylaxis                                          Feeding as tolerated    RENAL:  F/u  lytes.  Correct as needed. accurate I/O, CT noted    INFECTIOUS DISEASE: abx per ID, procal    HEMATOLOGICAL:  DVT prophylaxis. LE doppler    ENDOCRINE:  Follow up FS.  Insulin protocol if needed.    SDU

## 2025-06-20 NOTE — PROGRESS NOTE ADULT - ASSESSMENT
· Assessment	  79-year-old male with past medical history of HTN, MDD, schizophrenia, dementia presents to the ED for left lower quadrant abdominal pain associated shortness of breath and cough. pt states he has been having this sudden onset of SOB and cough with deep inspiration. States he is an active smoker. Denies any fever, chest pain, nausea, vomiting, diarrhea, dysuria, hemoptysis, palpitations, lightheadedness, Sick contacts.     ID consulted for diagnostic work up and antimicrobial treatment of bacteremia  Status of condition: critical     IMPRESSION/RECOMMENDATIONS  Immunosuppression/Immunosenescence ( above age 60 yrs there is a exponential decline in immunity which could result in poor clinical outcomes.  Hypoxic respiratory failure . Bipap   Acute sharp abd pain : still persist but less so  No BM in 24h  6/17 CT : possibly RLL PE  Clinically localized left sided peritonits  6/20 CXR left blunted : ( Independent interpretation of test : possible LLL bacterial PNA  NO Left sided PE  No VZV  Possibly diverticulitis with perforation  Lactate 4.2 > more suggestive of GI etiology  WBC 6.0>18.2  6/17 BCx 2/2 CoNS : not a true pathogen and will no treat  6/18 Nares ORSA NG  6/18 COVID 19/ Influenza/ RSV NG.   6/19 ECHO : no vegetations      < from: CT Angio Chest PE Protocol w/ IV Cont (06.17.25 @ 12:33) >  Negative for pulmonary emboli  Emphysematous changes. Bibasilar atelectasis. Areas of groundglass attenuation which could reflect air trapping/airway disease.  < end of copied text >    < from: US Kidney and Bladder (06.18.25 @ 18:27) >  No hydronephrosis.      HTN  MDD  Schizophrenia  dementia    -Off loading to prevent pressure sores and preventive measures to avoid aspiration  -obtain CT A/P with po/iv contrast ( unclear what the LUQ abd pain is due to ?   -continue with Rocephin 2 gm iv q24h  -Flagyl 500 mg iv q8h  -duration/type/po vs iv ABx not clear at this point and will make recommendaitons based on further clinical and microbiological information.     Discussion of management/test results( independently interpretated by me ) /antibiotic regimen  with external/primary medical ICU team. Dr Napoles

## 2025-06-20 NOTE — CONSULT NOTE ADULT - SUBJECTIVE AND OBJECTIVE BOX
Patient is a 79y old  Male who presents with a chief complaint of SOB (19 Jun 2025 14:58)    79-year-old male with past medical history of HTN, MDD, schizophrenia, dementia presents to the ED for left lower quadrant abdominal pain associated shortness of breath and cough. pt states he has been having this sudden onset of SOB and cough with deep inspiration. States he is an active smoker. Denies any fever, chest pain, nausea, vomiting, diarrhea, dysuria, hemoptysis, palpitations, lightheadedness, Sick contacts.     · Temp at ED Arrival (C)	36.7 Degrees C  · SpO2 (%)	98 %  · Temp site	oral  · Temp (C)	36.7 Degrees C  · Temp (F)	98.1 Degrees F  · Respiration Rate (breaths/min)	18 /min  · Heart Rate	  114 /min  · BP Diastolic	  97 mm Hg  · BP Systolic	  200 mm Hg    CT Angio Chest PE Protocol w/ IV Cont (06.17.25 @ 12:33) >  Negative for pulmonary emboli    Emphysematous changes. Bibasilar atelectasis. Areas of ground glass attenuation which could reflect air trapping/airway disease.    CT abd/pelvis - unremarkable    Labs: WBC 6K, neg trops, BUN/Cr 16/2.1, VBG pH7.36, lactate 3.3 >2.7, pCO2 38, UA neg (17 Jun 2025 18:01)  admitted with COPD exacerbation. He was treated with steroids (IV Solumedrol), nebulizers, inhalers, and antibiotics (Rocephin, azithromycin initially) . blood cx revealed gram-positive bacteremia, repeat blood cultures done echocardiogram ordered,      His hypertension, improved on home medications and he had a creatinine of 2.1 from 1.6, suspected KRYSTINA on CKD stage 3 vs progression of CKD, ruled out hydronephrosis, treated with fluids.     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    RRT  was called for hypoxia (desaturations to the 80s) during the abdominal CT scan to evaluate GI source, he received Solumedrol 125 mg IV, improved with high-flow nasal cannula and then BiPAP, had an elevated lactate,   unremarkable ABG/  transfered to SDU on BIPAP, this am feels better co cough    PAST MEDICAL & SURGICAL HISTORY:  Schizophrenia      No significant past surgical history          SOCIAL HX:   Smoking  +        REVIEW OF SYSTEMS see hpi  Allergies    No Known Allergies    Intolerances        albuterol    90 MICROgram(s) HFA Inhaler 2 Puff(s) Inhalation two times a day  albuterol/ipratropium for Nebulization 3 milliLiter(s) Nebulizer every 6 hours  amLODIPine   Tablet 5 milliGRAM(s) Oral daily  atorvastatin 40 milliGRAM(s) Oral at bedtime  buPROPion XL (24-Hour) . 150 milliGRAM(s) Oral daily  cefTRIAXone   IVPB 2000 milliGRAM(s) IV Intermittent every 24 hours  chlorhexidine 2% Cloths 1 Application(s) Topical <User Schedule>  fluPHENAZine 10 milliGRAM(s) Oral daily  fluticasone propionate/ salmeterol 500-50 MICROgram(s) Diskus 1 Dose(s) Inhalation two times a day  folic acid 1 milliGRAM(s) Oral daily  guaiFENesin Oral Liquid (Sugar-Free) 100 milliGRAM(s) Oral every 6 hours  heparin   Injectable 5000 Unit(s) SubCutaneous every 8 hours  hydrALAZINE 25 milliGRAM(s) Oral three times a day  hydrALAZINE 25 milliGRAM(s) Oral once  lactated ringers Bolus 1000 milliLiter(s) IV Bolus once  melatonin 3 milliGRAM(s) Oral at bedtime PRN  memantine 5 milliGRAM(s) Oral at bedtime  methylPREDNISolone sodium succinate Injectable 40 milliGRAM(s) IV Push every 12 hours  metroNIDAZOLE  IVPB 500 milliGRAM(s) IV Intermittent every 8 hours  nicotine -  14 mG/24Hr(s) Patch 1 Patch Transdermal daily  OLANZapine 20 milliGRAM(s) Oral at bedtime  ondansetron Injectable 4 milliGRAM(s) IV Push every 8 hours PRN  pantoprazole    Tablet 40 milliGRAM(s) Oral before breakfast  thiamine 100 milliGRAM(s) Oral daily  : Home Meds:      PHYSICAL EXAM    ICU Vital Signs Last 24 Hrs  T(C): 36.6 (20 Jun 2025 04:30), Max: 37.2 (19 Jun 2025 20:55)  T(F): 97.9 (20 Jun 2025 04:30), Max: 98.9 (19 Jun 2025 20:55)  HR: 104 (20 Jun 2025 05:31) (82 - 104)  BP: 165/79 (20 Jun 2025 04:30) (130/76 - 175/76)  BP(mean): 114 (20 Jun 2025 04:30) (101 - 114)  RR: 26 (20 Jun 2025 05:31) (15 - 26)  SpO2: 94% (20 Jun 2025 05:31) (94% - 100%)    O2 Parameters below as of 20 Jun 2025 05:31  Patient On (Oxygen Delivery Method): nasal cannula, high flow  O2 Flow (L/min): 50  O2 Concentration (%): 60        General: ill looking  Lungs: Bilateral rhonchi  Cardiovascular: BRAVO 2.6  Abdomen: Soft, mild l side tenderness  Extremities: No clubbing  Neurological: Non focal       06-18-25 @ 07:01  -  06-19-25 @ 07:00  --------------------------------------------------------  IN:  Total IN: 0 mL    OUT:    Voided (mL): 1150 mL  Total OUT: 1150 mL    Total NET: -1150 mL          LABS:                          14.1   18.25 )-----------( 223      ( 19 Jun 2025 22:30 )             42.5                                               06-19    132[L]  |  99  |  30[H]  ----------------------------<  139[H]  5.0   |  19  |  2.2[H]    Ca    8.8      19 Jun 2025 22:30  Phos  4.7     06-19  Mg     1.8     06-19    TPro  7.5  /  Alb  4.2  /  TBili  0.3  /  DBili  x   /  AST  22  /  ALT  24  /  AlkPhos  82  06-19      PT/INR - ( 19 Jun 2025 22:29 )   PT: 10.70 sec;   INR: 0.91 ratio         PTT - ( 19 Jun 2025 22:29 )  PTT:25.5 sec                                       Urinalysis Basic - ( 19 Jun 2025 22:30 )    Color: x / Appearance: x / SG: x / pH: x  Gluc: 139 mg/dL / Ketone: x  / Bili: x / Urobili: x   Blood: x / Protein: x / Nitrite: x   Leuk Esterase: x / RBC: x / WBC x   Sq Epi: x / Non Sq Epi: x / Bacteria: x                                                  LIVER FUNCTIONS - ( 19 Jun 2025 22:30 )  Alb: 4.2 g/dL / Pro: 7.5 g/dL / ALK PHOS: 82 U/L / ALT: 24 U/L / AST: 22 U/L / GGT: x                                                  Culture - Blood (collected 17 Jun 2025 20:43)  Source: Blood Blood-Peripheral  Gram Stain (19 Jun 2025 05:52):    Growth in anaerobic bottle: Gram Positive Cocci in Clusters  Preliminary Report (19 Jun 2025 05:52):    Growth in anaerobic bottle: Gram Positive Cocci in Clusters    Culture - Blood (collected 17 Jun 2025 20:34)  Source: Blood Blood-Peripheral  Gram Stain (19 Jun 2025 01:42):    Growth in anaerobic bottle: Gram Positive Cocci in Clusters  Final Report (19 Jun 2025 21:53):    Growth in anaerobic bottle: Staphylococcus epidermidis    Isolation of Coagulase negative Staphylococcus from single blood culture    sets may represent    contamination. Contact the Microbiology Department at [phone] if    susceptibility testing is needed.    clinically indicated.    Direct identification is available within approximately 3-5    hours either by Blood Panel Multiplexed PCR or Direct    MALDI-TOF. Details: [email or web address]  Organism: Blood Culture PCR (19 Jun 2025 21:53)  Organism: Blood Culture PCR (19 Jun 2025 21:53)    Urinalysis with Rflx Culture (collected 17 Jun 2025 09:26)                                                                                       ABG - ( 20 Jun 2025 00:16 )  pH, Arterial: 7.39  pH, Blood: x     /  pCO2: 32    /  pO2: 197   / HCO3: 19    / Base Excess: -4.5  /  SaO2: 99.0            MEDICATIONS  (STANDING):  albuterol    90 MICROgram(s) HFA Inhaler 2 Puff(s) Inhalation two times a day  albuterol/ipratropium for Nebulization 3 milliLiter(s) Nebulizer every 6 hours  amLODIPine   Tablet 5 milliGRAM(s) Oral daily  atorvastatin 40 milliGRAM(s) Oral at bedtime  buPROPion XL (24-Hour) . 150 milliGRAM(s) Oral daily  cefTRIAXone   IVPB 2000 milliGRAM(s) IV Intermittent every 24 hours  chlorhexidine 2% Cloths 1 Application(s) Topical <User Schedule>  fluPHENAZine 10 milliGRAM(s) Oral daily  fluticasone propionate/ salmeterol 500-50 MICROgram(s) Diskus 1 Dose(s) Inhalation two times a day  folic acid 1 milliGRAM(s) Oral daily  guaiFENesin Oral Liquid (Sugar-Free) 100 milliGRAM(s) Oral every 6 hours  heparin   Injectable 5000 Unit(s) SubCutaneous every 8 hours  hydrALAZINE 25 milliGRAM(s) Oral three times a day  hydrALAZINE 25 milliGRAM(s) Oral once  lactated ringers Bolus 1000 milliLiter(s) IV Bolus once  memantine 5 milliGRAM(s) Oral at bedtime  methylPREDNISolone sodium succinate Injectable 40 milliGRAM(s) IV Push every 12 hours  metroNIDAZOLE  IVPB 500 milliGRAM(s) IV Intermittent every 8 hours  nicotine -  14 mG/24Hr(s) Patch 1 Patch Transdermal daily  OLANZapine 20 milliGRAM(s) Oral at bedtime  pantoprazole    Tablet 40 milliGRAM(s) Oral before breakfast  thiamine 100 milliGRAM(s) Oral daily    MEDICATIONS  (PRN):  melatonin 3 milliGRAM(s) Oral at bedtime PRN Insomnia  ondansetron Injectable 4 milliGRAM(s) IV Push every 8 hours PRN Nausea and/or Vomiting      CHEST CT/ CXR Noted  
  ANILA HORTON  79y, Male  Allergy: No Known Allergies      All historical available data reviewed.    HPI:  Patient is a 79-year-old male with past medical history of HTN, MDD, schizophrenia, dementia presents to the ED for left lower quadrant abdominal pain associated shortness of breath and cough. pt states he has been having this sudden onset of SOB and cough with deep inspiration. States he is an active smoker. Denies any fever, chest pain, nausea, vomiting, diarrhea, dysuria, hemoptysis, palpitations, lightheadedness, Sick contacts.     · Temp at ED Arrival (C)	36.7 Degrees C  · O2 Delivery/Oxygen Delivery Method	room air  · SpO2 (%)	98 %  · Temp site	oral  · Temp (C)	36.7 Degrees C  · Temp (F)	98.1 Degrees F  · Respiration Rate (breaths/min)	18 /min  · Heart Rate	  114 /min  · BP Diastolic	  97 mm Hg  · BP Systolic	  200 mm Hg    CT Angio Chest PE Protocol w/ IV Cont (06.17.25 @ 12:33) >  Negative for pulmonary emboli    Emphysematous changes. Bibasilar atelectasis. Areas of ground glass attenuation which could reflect air trapping/airway disease.    CT abd/pelvis - unremarkable    Labs: WBC 6K, neg trops, BUN/Cr 16/2.1, VBG pH7.36, lactate 3.3 >2.7, pCO2 38, UA neg (17 Jun 2025 18:01)    INFECTIOUS DISEASE HISTORY.  ID consulted for diagnostic work up/ABx recommendations  Prior hospital charts reviewed.   Primary team notes reviewed.   Other consultant notes reviewed.   Prior cultures noted     FAMILY HISTORY:    PAST MEDICAL & SURGICAL HISTORY:  Schizophrenia      No significant past surgical history            VITALS:  T(F): 98, Max: 98.4 (06-18-25 @ 19:27)  HR: 84  BP: 132/70  RR: 16Vital Signs Last 24 Hrs  T(C): 36.7 (19 Jun 2025 12:00), Max: 36.9 (18 Jun 2025 19:27)  T(F): 98 (19 Jun 2025 12:00), Max: 98.4 (18 Jun 2025 19:27)  HR: 84 (19 Jun 2025 12:00) (84 - 87)  BP: 132/70 (19 Jun 2025 12:00) (132/70 - 148/72)  BP(mean): --  RR: 16 (19 Jun 2025 12:00) (16 - 18)  SpO2: 98% (19 Jun 2025 12:00) (97% - 98%)        TESTS & MEASUREMENTS:                        14.1   18.24 )-----------( 201      ( 19 Jun 2025 07:55 )             43.7     06-19    141  |  109  |  29[H]  ----------------------------<  118[H]  4.5   |  21  |  2.2[H]    Ca    8.8      19 Jun 2025 07:55    TPro  6.7  /  Alb  4.2  /  TBili  0.2  /  DBili  x   /  AST  15  /  ALT  14  /  AlkPhos  76  06-18    LIVER FUNCTIONS - ( 18 Jun 2025 08:16 )  Alb: 4.2 g/dL / Pro: 6.7 g/dL / ALK PHOS: 76 U/L / ALT: 14 U/L / AST: 15 U/L / GGT: x             Culture - Blood (collected 06-17-25 @ 20:43)  Source: Blood Blood-Peripheral  Gram Stain (06-19-25 @ 05:52):    Growth in anaerobic bottle: Gram Positive Cocci in Clusters  Preliminary Report (06-19-25 @ 05:52):    Growth in anaerobic bottle: Gram Positive Cocci in Clusters    Culture - Blood (collected 06-17-25 @ 20:34)  Source: Blood Blood-Peripheral  Gram Stain (06-19-25 @ 01:42):    Growth in anaerobic bottle: Gram Positive Cocci in Clusters  Preliminary Report (06-19-25 @ 01:42):    Growth in anaerobic bottle: Gram Positive Cocci in Clusters    Direct identification is available within approximately 3-5    hours either by Blood Panel Multiplexed PCR or Direct    MALDI-TOF. Details: [email or web address]  Organism: Blood Culture PCR (06-19-25 @ 07:35)  Organism: Blood Culture PCR (06-19-25 @ 07:35)      Method Type: PCR      -  Staphylococcus epidermidis: Detec    Urinalysis with Rflx Culture (collected 06-17-25 @ 09:26)      Urinalysis Basic - ( 19 Jun 2025 07:55 )    Color: x / Appearance: x / SG: x / pH: x  Gluc: 118 mg/dL / Ketone: x  / Bili: x / Urobili: x   Blood: x / Protein: x / Nitrite: x   Leuk Esterase: x / RBC: x / WBC x   Sq Epi: x / Non Sq Epi: x / Bacteria: x          RADIOLOGY & ADDITIONAL TESTS:  Personal review of radiological diagnostics performed  Echo and EKG results noted when applicable.     MEDICATIONS:  albuterol    90 MICROgram(s) HFA Inhaler 2 Puff(s) Inhalation two times a day  albuterol/ipratropium for Nebulization 3 milliLiter(s) Nebulizer every 6 hours  amLODIPine   Tablet 5 milliGRAM(s) Oral daily  atorvastatin 40 milliGRAM(s) Oral at bedtime  azithromycin  IVPB 500 milliGRAM(s) IV Intermittent every 24 hours  azithromycin  IVPB      buPROPion XL (24-Hour) . 150 milliGRAM(s) Oral daily  chlorhexidine 2% Cloths 1 Application(s) Topical <User Schedule>  fluPHENAZine 10 milliGRAM(s) Oral daily  folic acid 1 milliGRAM(s) Oral daily  guaiFENesin Oral Liquid (Sugar-Free) 100 milliGRAM(s) Oral every 6 hours  heparin   Injectable 5000 Unit(s) SubCutaneous every 8 hours  hydrALAZINE 25 milliGRAM(s) Oral three times a day  lactated ringers. 1000 milliLiter(s) IV Continuous <Continuous>  melatonin 3 milliGRAM(s) Oral at bedtime PRN  memantine 5 milliGRAM(s) Oral at bedtime  methylPREDNISolone sodium succinate Injectable 60 milliGRAM(s) IV Push at bedtime  nicotine -  14 mG/24Hr(s) Patch 1 Patch Transdermal daily  OLANZapine 20 milliGRAM(s) Oral at bedtime  ondansetron Injectable 4 milliGRAM(s) IV Push every 8 hours PRN  pantoprazole    Tablet 40 milliGRAM(s) Oral before breakfast  thiamine 100 milliGRAM(s) Oral daily  vancomycin  IVPB 1000 milliGRAM(s) IV Intermittent once      ANTIBIOTICS:  azithromycin  IVPB 500 milliGRAM(s) IV Intermittent every 24 hours  azithromycin  IVPB      vancomycin  IVPB 1000 milliGRAM(s) IV Intermittent once    
GENERAL SURGERY CONSULT NOTE    Patient: ANILA HORTON , 79y ([birth year])Male   MRN: [id number]  Location: [address]  Visit: 06-17-25 Inpatient  Date: 06-20-25 @ 17:24    HPI:  Patient is a 79-year-old male with past medical history of HTN, MDD, schizophrenia, dementia presents to the ED for left lower quadrant abdominal pain associated shortness of breath and cough admitted for URI, blood cultures positive with GPC in pairs. Patient is a poor historian on exam, and only mentions LLQ pain, states no associating symptoms. KUB done 6/20 reveals large colonic fecal burden within the ascending colon and nonspecific bowel gas pattern       · Temp at ED Arrival (C)	36.7 Degrees C  · O2 Delivery/Oxygen Delivery Method	room air  · SpO2 (%)	98 %  · Temp site	oral  · Temp (C)	36.7 Degrees C  · Temp (F)	98.1 Degrees F  · Respiration Rate (breaths/min)	18 /min  · Heart Rate	  114 /min  · BP Diastolic	  97 mm Hg  · BP Systolic	  200 mm Hg    CT Angio Chest PE Protocol w/ IV Cont (06.17.25 @ 12:33) >  Negative for pulmonary emboli    Emphysematous changes. Bibasilar atelectasis. Areas of ground glass attenuation which could reflect air trapping/airway disease.    CT abd/pelvis - unremarkable    Labs: WBC 6K, neg trops, BUN/Cr 16/2.1, VBG pH7.36, lactate 3.3 >2.7, pCO2 38, UA neg (17 Jun 2025 18:01)      PAST MEDICAL & SURGICAL HISTORY:  Schizophrenia      No significant past surgical history          Home Medications:  Advair Diskus 250 mcg-50 mcg/inh inhalation powder: 1 puff(s) inhaled 2 times a day (17 Jun 2025 19:02)  Benicar 40 mg oral tablet: 1 tab(s) orally once a day (17 Jun 2025 19:02)  buPROPion 150 mg/24 hours (XL) oral tablet, extended release: 1 tab(s) orally once a day (17 Jun 2025 19:02)  fluPHENAZine 10 mg oral tablet: 1 tab(s) orally once a day (17 Jun 2025 19:02)  folic acid 0.4 mg oral tablet: 1 tab(s) orally once a day (17 Jun 2025 19:02)  lisinopril 20 mg oral tablet: 1 tab(s) orally once a day (17 Jun 2025 19:02)  loratadine 10 mg oral tablet: 1 tab(s) orally once a day (at bedtime) (17 Jun 2025 19:02)  Namenda 5 mg oral tablet: 1 tab(s) orally once a day (at bedtime) (17 Jun 2025 19:02)  Norvasc 5 mg oral tablet: 1 tab(s) orally once a day (17 Jun 2025 19:02)  OLANZapine 20 mg oral tablet: 1 tab(s) orally once a day (at bedtime) (17 Jun 2025 19:02)  omeprazole 40 mg oral delayed release capsule: 1 cap(s) orally once a day (17 Jun 2025 19:02)  thiamine 100 mg oral tablet: 1 tab(s) orally once a day (17 Jun 2025 19:02)        VITALS:  T(F): 98.1 (06-20-25 @ 11:26), Max: 98.9 (06-19-25 @ 20:55)  HR: 83 (06-20-25 @ 13:05) (82 - 104)  BP: 190/86 (06-20-25 @ 11:26) (130/76 - 190/86)  RR: 18 (06-20-25 @ 11:26) (15 - 26)  SpO2: 94% (06-20-25 @ 13:05) (94% - 100%)    PHYSICAL EXAM:  General: on BIPAP  HEENT: NCAT, FAN  Cardiac: RRR   Respiratory: symmetric chest rise and fall on bipap machine   Abdomen: Soft, non-distended, tender in LLQ, no rebound   Musculoskeletal: Strength 5/5 BL UE/LE, ROM intact, compartments soft  Skin: Warm/dry, normal color, no jaundice      MEDICATIONS  (STANDING):  albuterol    90 MICROgram(s) HFA Inhaler 2 Puff(s) Inhalation two times a day  atorvastatin 40 milliGRAM(s) Oral at bedtime  buPROPion XL (24-Hour) . 150 milliGRAM(s) Oral daily  cefTRIAXone   IVPB 2000 milliGRAM(s) IV Intermittent every 24 hours  chlorhexidine 2% Cloths 1 Application(s) Topical <User Schedule>  diatrizoate meglumine/diatrizoate sodium. 30 milliLiter(s) Oral once  fluPHENAZine 10 milliGRAM(s) Oral daily  fluticasone propionate/ salmeterol 500-50 MICROgram(s) Diskus 1 Dose(s) Inhalation two times a day  folic acid 1 milliGRAM(s) Oral daily  guaiFENesin Oral Liquid (Sugar-Free) 100 milliGRAM(s) Oral every 6 hours  heparin   Injectable 5000 Unit(s) SubCutaneous every 8 hours  hydrALAZINE Injectable 10 milliGRAM(s) IV Push three times a day  lactated ringers. 500 milliLiter(s) (100 mL/Hr) IV Continuous <Continuous>  memantine 5 milliGRAM(s) Oral at bedtime  methylPREDNISolone sodium succinate Injectable 40 milliGRAM(s) IV Push every 12 hours  metroNIDAZOLE  IVPB 500 milliGRAM(s) IV Intermittent every 8 hours  nicotine -  14 mG/24Hr(s) Patch 1 Patch Transdermal daily  OLANZapine 20 milliGRAM(s) Oral at bedtime  pantoprazole    Tablet 40 milliGRAM(s) Oral before breakfast  polyethylene glycol 3350 17 Gram(s) Oral two times a day  senna 2 Tablet(s) Oral at bedtime  thiamine 100 milliGRAM(s) Oral daily    MEDICATIONS  (PRN):  acetaminophen     Tablet .. 650 milliGRAM(s) Oral every 6 hours PRN Mild Pain (1 - 3), Moderate Pain (4 - 6)  albuterol/ipratropium for Nebulization 3 milliLiter(s) Nebulizer every 6 hours PRN Shortness of Breath and/or Wheezing  LORazepam   Injectable 0.5 milliGRAM(s) IV Push every 8 hours PRN Agitation/anxiety  melatonin 3 milliGRAM(s) Oral at bedtime PRN Insomnia  metoclopramide Injectable 10 milliGRAM(s) IV Push three times a day PRN nausea  ondansetron Injectable 4 milliGRAM(s) IV Push every 8 hours PRN Nausea and/or Vomiting      LAB/STUDIES:                        14.0   14.86 )-----------( 211      ( 20 Jun 2025 11:25 )             42.6     06-20    132[L]  |  100  |  36[H]  ----------------------------<  165[H]  5.0   |  19  |  2.2[H]    Ca    8.7      20 Jun 2025 11:25  Phos  3.8     06-20  Mg     1.8     06-20    TPro  6.8  /  Alb  4.2  /  TBili  0.3  /  DBili  x   /  AST  20  /  ALT  21  /  AlkPhos  76  06-20    PT/INR - ( 19 Jun 2025 22:29 )   PT: 10.70 sec;   INR: 0.91 ratio         PTT - ( 19 Jun 2025 22:29 )  PTT:25.5 sec  LIVER FUNCTIONS - ( 20 Jun 2025 11:25 )  Alb: 4.2 g/dL / Pro: 6.8 g/dL / ALK PHOS: 76 U/L / ALT: 21 U/L / AST: 20 U/L / GGT: x           Urinalysis Basic - ( 20 Jun 2025 11:25 )    Color: x / Appearance: x / SG: x / pH: x  Gluc: 165 mg/dL / Ketone: x  / Bili: x / Urobili: x   Blood: x / Protein: x / Nitrite: x   Leuk Esterase: x / RBC: x / WBC x   Sq Epi: x / Non Sq Epi: x / Bacteria: x              ABG - ( 20 Jun 2025 13:22 )  pH, Arterial: 7.36  pH, Blood: x     /  pCO2: 35    /  pO2: 116   / HCO3: 20    / Base Excess: -4.9  /  SaO2: 98.2                Culture - Blood (collected 19 Jun 2025 07:55)  Source: Blood None  Preliminary Report (20 Jun 2025 17:01):    No growth at 24 hours    Culture - Blood (collected 17 Jun 2025 20:43)  Source: Blood Blood-Peripheral  Gram Stain (19 Jun 2025 05:52):    Growth in anaerobic bottle: Gram Positive Cocci in Clusters  Preliminary Report (19 Jun 2025 05:52):    Growth in anaerobic bottle: Gram Positive Cocci in Clusters    Culture - Blood (collected 17 Jun 2025 20:34)  Source: Blood Blood-Peripheral  Gram Stain (19 Jun 2025 01:42):    Growth in anaerobic bottle: Gram Positive Cocci in Clusters  Final Report (19 Jun 2025 21:53):    Growth in anaerobic bottle: Staphylococcus epidermidis    Isolation of Coagulase negative Staphylococcus from single blood culture    sets may represent    contamination. Contact the Microbiology Department at [phone] if    susceptibility testing is needed.    clinically indicated.    Direct identification is available within approximately 3-5    hours either by Blood Panel Multiplexed PCR or Direct    MALDI-TOF. Details: [email or web address]  Organism: Blood Culture PCR (19 Jun 2025 21:53)  Organism: Blood Culture PCR (19 Jun 2025 21:53)      IMAGING:  < from: Xray Kidney Ureter Bladder (06.20.25 @ 13:48) >  Large colonic fecal burden within the ascending colon. Otherwise   nonspecific bowel gas pattern. Recommend correlation with scheduled CT   abdomen. Additional findings without significant change    < end of copied text >

## 2025-06-20 NOTE — PROGRESS NOTE ADULT - SUBJECTIVE AND OBJECTIVE BOX
ANILA HORTON  79y, Male    All available historical data reviewed    OVERNIGHT EVENTS:  s/p rapid response due to SOB 6/19  Presently on Bipap  feels well and has no new complaints  No fevers     ROS:  General: Denies rigors, nightsweats  HEENT: Denies headache, rhinorrhea, sore throat, eye pain  CV: Denies CP, palpitations  PULM: Denies wheezing, hemoptysis  GI: left sided abd pain  : Denies discharge, hematuria  MSK: Denies arthralgias, myalgias  SKIN: Denies rash, lesions  NEURO: weakness  PSYCH: Denies depression, anxiety    VITALS:  T(F): 98.1, Max: 98.9 (06-19-25 @ 20:55)  HR: 100  BP: 190/86  RR: 18Vital Signs Last 24 Hrs  T(C): 36.7 (20 Jun 2025 11:26), Max: 37.2 (19 Jun 2025 20:55)  T(F): 98.1 (20 Jun 2025 11:26), Max: 98.9 (19 Jun 2025 20:55)  HR: 100 (20 Jun 2025 11:26) (82 - 104)  BP: 190/86 (20 Jun 2025 11:26) (130/76 - 190/86)  BP(mean): 123 (20 Jun 2025 11:26) (101 - 123)  RR: 18 (20 Jun 2025 11:26) (15 - 26)  SpO2: 97% (20 Jun 2025 11:26) (94% - 100%)    Parameters below as of 20 Jun 2025 08:00  Patient On (Oxygen Delivery Method): nasal cannula, high flow  O2 Flow (L/min): 50  O2 Concentration (%): 50    TESTS & MEASUREMENTS:                        14.0   14.86 )-----------( 211      ( 20 Jun 2025 11:25 )             42.6     06-19    132[L]  |  99  |  30[H]  ----------------------------<  139[H]  5.0   |  19  |  2.2[H]    Ca    8.8      19 Jun 2025 22:30  Phos  4.7     06-19  Mg     1.8     06-19    TPro  7.5  /  Alb  4.2  /  TBili  0.3  /  DBili  x   /  AST  22  /  ALT  24  /  AlkPhos  82  06-19    LIVER FUNCTIONS - ( 19 Jun 2025 22:30 )  Alb: 4.2 g/dL / Pro: 7.5 g/dL / ALK PHOS: 82 U/L / ALT: 24 U/L / AST: 22 U/L / GGT: x             Culture - Blood (collected 06-17-25 @ 20:43)  Source: Blood Blood-Peripheral  Gram Stain (06-19-25 @ 05:52):    Growth in anaerobic bottle: Gram Positive Cocci in Clusters  Preliminary Report (06-19-25 @ 05:52):    Growth in anaerobic bottle: Gram Positive Cocci in Clusters    Culture - Blood (collected 06-17-25 @ 20:34)  Source: Blood Blood-Peripheral  Gram Stain (06-19-25 @ 01:42):    Growth in anaerobic bottle: Gram Positive Cocci in Clusters  Final Report (06-19-25 @ 21:53):    Growth in anaerobic bottle: Staphylococcus epidermidis    Isolation of Coagulase negative Staphylococcus from single blood culture    sets may represent    contamination. Contact the Microbiology Department at [phone] if    susceptibility testing is needed.    clinically indicated.    Direct identification is available within approximately 3-5    hours either by Blood Panel Multiplexed PCR or Direct    MALDI-TOF. Details: [email or web address]  Organism: Blood Culture PCR (06-19-25 @ 21:53)  Organism: Blood Culture PCR (06-19-25 @ 21:53)      -  Staphylococcus epidermidis: Detec      Method Type: PCR    Urinalysis with Rflx Culture (collected 06-17-25 @ 09:26)      Urinalysis Basic - ( 19 Jun 2025 22:30 )    Color: x / Appearance: x / SG: x / pH: x  Gluc: 139 mg/dL / Ketone: x  / Bili: x / Urobili: x   Blood: x / Protein: x / Nitrite: x   Leuk Esterase: x / RBC: x / WBC x   Sq Epi: x / Non Sq Epi: x / Bacteria: x          Social History:  Tobacco Use: No  Alcohol Use: No  Drug Use: No    RADIOLOGY & ADDITIONAL TESTS:  Personal review of radiological diagnostics performed  Echo and EKG results noted when applicable.     MEDICATIONS:  acetaminophen     Tablet .. 650 milliGRAM(s) Oral every 6 hours PRN  albuterol    90 MICROgram(s) HFA Inhaler 2 Puff(s) Inhalation two times a day  albuterol/ipratropium for Nebulization 3 milliLiter(s) Nebulizer every 6 hours PRN  atorvastatin 40 milliGRAM(s) Oral at bedtime  buPROPion XL (24-Hour) . 150 milliGRAM(s) Oral daily  cefTRIAXone   IVPB 2000 milliGRAM(s) IV Intermittent every 24 hours  chlorhexidine 2% Cloths 1 Application(s) Topical <User Schedule>  fluPHENAZine 10 milliGRAM(s) Oral daily  fluticasone propionate/ salmeterol 500-50 MICROgram(s) Diskus 1 Dose(s) Inhalation two times a day  folic acid 1 milliGRAM(s) Oral daily  guaiFENesin Oral Liquid (Sugar-Free) 100 milliGRAM(s) Oral every 6 hours  heparin   Injectable 5000 Unit(s) SubCutaneous every 8 hours  hydrALAZINE 25 milliGRAM(s) Oral three times a day  lactated ringers. 500 milliLiter(s) IV Continuous <Continuous>  melatonin 3 milliGRAM(s) Oral at bedtime PRN  memantine 5 milliGRAM(s) Oral at bedtime  methylPREDNISolone sodium succinate Injectable 40 milliGRAM(s) IV Push every 12 hours  metoclopramide Injectable 10 milliGRAM(s) IV Push three times a day PRN  metroNIDAZOLE  IVPB 500 milliGRAM(s) IV Intermittent every 8 hours  nicotine -  14 mG/24Hr(s) Patch 1 Patch Transdermal daily  OLANZapine 20 milliGRAM(s) Oral at bedtime  ondansetron Injectable 4 milliGRAM(s) IV Push every 8 hours PRN  pantoprazole    Tablet 40 milliGRAM(s) Oral before breakfast  polyethylene glycol 3350 17 Gram(s) Oral two times a day  senna 2 Tablet(s) Oral at bedtime  thiamine 100 milliGRAM(s) Oral daily      ANTIBIOTICS:  cefTRIAXone   IVPB 2000 milliGRAM(s) IV Intermittent every 24 hours  metroNIDAZOLE  IVPB 500 milliGRAM(s) IV Intermittent every 8 hours

## 2025-06-20 NOTE — CONSULT NOTE ADULT - CONSULT REASON
SOB
persistent abdominal pain
ID consulted for diagnostic work up and antimicrobial treatment of sepsis

## 2025-06-20 NOTE — PROGRESS NOTE ADULT - SUBJECTIVE AND OBJECTIVE BOX
ANILA HORTON 79y Male  MRN#: [id number]     Hospital Day: 3d      SUBJECTIVE  Overnight events noted, RR for AHRF requiring HFNC upgraded to SDU, pt seen and examined this morning.                                          ----------------------------------------------------------  OBJECTIVE  PAST MEDICAL & SURGICAL HISTORY  Schizophrenia    No significant past surgical history                                              -----------------------------------------------------------  ALLERGIES:  No Known Allergies                                            ------------------------------------------------------------    HOME MEDICATIONS  Home Medications:  Advair Diskus 250 mcg-50 mcg/inh inhalation powder: 1 puff(s) inhaled 2 times a day (17 Jun 2025 19:02)  Benicar 40 mg oral tablet: 1 tab(s) orally once a day (17 Jun 2025 19:02)  buPROPion 150 mg/24 hours (XL) oral tablet, extended release: 1 tab(s) orally once a day (17 Jun 2025 19:02)  fluPHENAZine 10 mg oral tablet: 1 tab(s) orally once a day (17 Jun 2025 19:02)  folic acid 0.4 mg oral tablet: 1 tab(s) orally once a day (17 Jun 2025 19:02)  lisinopril 20 mg oral tablet: 1 tab(s) orally once a day (17 Jun 2025 19:02)  loratadine 10 mg oral tablet: 1 tab(s) orally once a day (at bedtime) (17 Jun 2025 19:02)  Namenda 5 mg oral tablet: 1 tab(s) orally once a day (at bedtime) (17 Jun 2025 19:02)  Norvasc 5 mg oral tablet: 1 tab(s) orally once a day (17 Jun 2025 19:02)  OLANZapine 20 mg oral tablet: 1 tab(s) orally once a day (at bedtime) (17 Jun 2025 19:02)  omeprazole 40 mg oral delayed release capsule: 1 cap(s) orally once a day (17 Jun 2025 19:02)  thiamine 100 mg oral tablet: 1 tab(s) orally once a day (17 Jun 2025 19:02)                           MEDICATIONS:  STANDING MEDICATIONS  albuterol    90 MICROgram(s) HFA Inhaler 2 Puff(s) Inhalation two times a day  atorvastatin 40 milliGRAM(s) Oral at bedtime  buPROPion XL (24-Hour) . 150 milliGRAM(s) Oral daily  cefTRIAXone   IVPB 2000 milliGRAM(s) IV Intermittent every 24 hours  chlorhexidine 2% Cloths 1 Application(s) Topical <User Schedule>  diatrizoate meglumine/diatrizoate sodium. 30 milliLiter(s) Oral once  fluPHENAZine 10 milliGRAM(s) Oral daily  fluticasone propionate/ salmeterol 500-50 MICROgram(s) Diskus 1 Dose(s) Inhalation two times a day  folic acid 1 milliGRAM(s) Oral daily  guaiFENesin Oral Liquid (Sugar-Free) 100 milliGRAM(s) Oral every 6 hours  heparin   Injectable 5000 Unit(s) SubCutaneous every 8 hours  hydrALAZINE 25 milliGRAM(s) Oral three times a day  lactated ringers. 500 milliLiter(s) IV Continuous <Continuous>  memantine 5 milliGRAM(s) Oral at bedtime  methylPREDNISolone sodium succinate Injectable 40 milliGRAM(s) IV Push every 12 hours  metroNIDAZOLE  IVPB 500 milliGRAM(s) IV Intermittent every 8 hours  nicotine -  14 mG/24Hr(s) Patch 1 Patch Transdermal daily  OLANZapine 20 milliGRAM(s) Oral at bedtime  pantoprazole    Tablet 40 milliGRAM(s) Oral before breakfast  polyethylene glycol 3350 17 Gram(s) Oral two times a day  senna 2 Tablet(s) Oral at bedtime  thiamine 100 milliGRAM(s) Oral daily    PRN MEDICATIONS  acetaminophen     Tablet .. 650 milliGRAM(s) Oral every 6 hours PRN  albuterol/ipratropium for Nebulization 3 milliLiter(s) Nebulizer every 6 hours PRN  melatonin 3 milliGRAM(s) Oral at bedtime PRN  metoclopramide Injectable 10 milliGRAM(s) IV Push three times a day PRN  ondansetron Injectable 4 milliGRAM(s) IV Push every 8 hours PRN                                            ------------------------------------------------------------  VITAL SIGNS: Last 24 Hours  T(C): 36.7 (20 Jun 2025 11:26), Max: 37.2 (19 Jun 2025 20:55)  T(F): 98.1 (20 Jun 2025 11:26), Max: 98.9 (19 Jun 2025 20:55)  HR: 100 (20 Jun 2025 11:26) (82 - 104)  BP: 190/86 (20 Jun 2025 11:26) (130/76 - 190/86)  BP(mean): 123 (20 Jun 2025 11:26) (101 - 123)  RR: 18 (20 Jun 2025 11:26) (15 - 26)  SpO2: 97% (20 Jun 2025 11:26) (94% - 100%)      06-20-25 @ 07:01  -  06-20-25 @ 13:11  --------------------------------------------------------  IN: 400 mL / OUT: 250 mL / NET: 150 mL                                             --------------------------------------------------------------  LABS:                        14.0   14.86 )-----------( 211      ( 20 Jun 2025 11:25 )             42.6     06-20    132[L]  |  100  |  36[H]  ----------------------------<  165[H]  5.0   |  19  |  2.2[H]    Ca    8.7      20 Jun 2025 11:25  Phos  3.8     06-20  Mg     1.8     06-20    TPro  6.8  /  Alb  4.2  /  TBili  0.3  /  DBili  x   /  AST  20  /  ALT  21  /  AlkPhos  76  06-20    PT/INR - ( 19 Jun 2025 22:29 )   PT: 10.70 sec;   INR: 0.91 ratio         PTT - ( 19 Jun 2025 22:29 )  PTT:25.5 sec  Urinalysis Basic - ( 20 Jun 2025 11:25 )    Color: x / Appearance: x / SG: x / pH: x  Gluc: 165 mg/dL / Ketone: x  / Bili: x / Urobili: x   Blood: x / Protein: x / Nitrite: x   Leuk Esterase: x / RBC: x / WBC x   Sq Epi: x / Non Sq Epi: x / Bacteria: x      ABG - ( 20 Jun 2025 00:16 )  pH, Arterial: 7.39  pH, Blood: x     /  pCO2: 32    /  pO2: 197   / HCO3: 19    / Base Excess: -4.5  /  SaO2: 99.0              Lactate, Blood: 3.0 mmol/L *H* (06-20-25 @ 11:25)  Lactate, Blood: 4.6 mmol/L *HH* (06-20-25 @ 05:31)  Lactate, Blood: 2.8 mmol/L *H* (06-19-25 @ 22:30)        Culture - Blood (collected 17 Jun 2025 20:43)  Source: Blood Blood-Peripheral  Gram Stain (19 Jun 2025 05:52):    Growth in anaerobic bottle: Gram Positive Cocci in Clusters  Preliminary Report (19 Jun 2025 05:52):    Growth in anaerobic bottle: Gram Positive Cocci in Clusters    Culture - Blood (collected 17 Jun 2025 20:34)  Source: Blood Blood-Peripheral  Gram Stain (19 Jun 2025 01:42):    Growth in anaerobic bottle: Gram Positive Cocci in Clusters  Final Report (19 Jun 2025 21:53):    Growth in anaerobic bottle: Staphylococcus epidermidis    Isolation of Coagulase negative Staphylococcus from single blood culture    sets may represent    contamination. Contact the Microbiology Department at [phone] if    susceptibility testing is needed.    clinically indicated.    Direct identification is available within approximately 3-5    hours either by Blood Panel Multiplexed PCR or Direct    MALDI-TOF. Details: [email or web address]  Organism: Blood Culture PCR (19 Jun 2025 21:53)  Organism: Blood Culture PCR (19 Jun 2025 21:53)                                                    -------------------------------------------------------------  RADIOLOGY:  CXR  Xray Chest 1 View- PORTABLE-Urgent:   ACC: [id number] EXAM:  XR CHEST PORTABLE URGENT 1V   ORDERED BY: LIZA KIM     PROCEDURE DATE:  06/20/2025          INTERPRETATION:  CLINICAL HISTORY: Shortness of breath    COMPARISON: Chest x-ray 6/19/2025..    TECHNIQUE: Portable frontal chest radiograph.    FINDINGS:    Support devices: None.    Cardiac/mediastinum/hilum: Stable.    Lung parenchyma/Pleura: Minimal blunting left costophrenic angle. No   pneumothorax.    Skeleton/soft tissues: Stable.      IMPRESSION:    Minimal blunting left costophrenic angle. No pneumothorax    --- End of Report ---            ROME WOMACK MD; Attending Radiologist  This document has been electronically signed. Jun 20 2025 11:19AM (06-20-25 @ 10:16)      CT                                            --------------------------------------------------------------    PHYSICAL EXAM:  GENERAL: NAD, lying in bed   CHEST/LUNG: decreased breath sounds, on HFNF 60/60   HEART: Regular rate and rhythm; No murmurs, rubs, or gallops  ABDOMEN: Soft, nondistended, tender in LUQ   EXTREMITIES:  No clubbing, cyanosis, or edema  NERVOUS SYSTEM:  A&Ox3

## 2025-06-20 NOTE — SWALLOW BEDSIDE ASSESSMENT ADULT - SWALLOW EVAL: DIAGNOSIS
functional swallow for puree, soft, and thin liquids via small bites and sips. oral impairment with chewables due to missing dentition. and some generalized weakness due to hospitalization

## 2025-06-20 NOTE — CONSULT NOTE ADULT - ATTENDING COMMENTS
Discussed case with primary team.  Reviewed imaging and lab work independently.    Visited pt at bedside. Unable to participate in ROS during visit. BIPAP in place    PE:  General; male, in bed, sleeping during visit  Head; BIPAP in place  Heart: RRR  Lungs; even chest rise, saturating 91% on BIPAP  Abdomen; soft, obese abdomen, minimal distention, non-tender, non-peritoneal, no appreciable lesions    A/P:  79 M with left sided abdominal pain of unclear etiology. Does not have a surgical abdomen during present exam.  - Would recommend a CT of the abdomen/pelvis with IV and PO contrast (prior CT reviewed, unimpressive in terms of intra-abdominal pathology)  - Sx to follow

## 2025-06-20 NOTE — CONSULT NOTE ADULT - ASSESSMENT
ASSESSMENT:  Patient is a 79-year-old male with past medical history of HTN, MDD, schizophrenia, dementia presents to the ED for left lower quadrant abdominal pain associated shortness of breath and cough admitted for URI, blood cultures positive with GPC in pairs. Patient is a poor historian on exam, and only mentions LLQ pain, states no associating symptoms. KUB done 6/20 reveals large colonic fecal burden within the ascending colon and nonspecific bowel gas pattern. Physical exam findings, imaging, and labs as documented above.     PLAN:  - Pending CT A/P   -  -        Above plan discussed with Attending Surgeon  ***  , patient, patient family, and Primary team  06-20-25 @ 17:24

## 2025-06-20 NOTE — PROGRESS NOTE ADULT - ASSESSMENT
79-year-old male with past medical history of HTN, MDD, schizophrenia, dementia presents to the ED for left lower quadrant abdominal pain associated shortness of breath and cough. pt states he has been having this sudden onset of SOB and cough with deep inspiration. States he is an active smoker. Vitals on admission significant for BP of 200/100 and 98% on 2L NC.   RR called overnight for AHRF now on JFNC, upgraded to SDU.    Acute hypoxemic resp failure on HHFNC/ NIV  Possible Aspiration event?  COPD exacerbation  Left side Abd pain  Acute on chronic renal failure  Lactic acidosis  HO HTN, MDD, schizophrenia, dementia     -RR overnight noted, upgraded to SDU  -now on HFNC 50/50, wean as tolerated  -CT abdomen wIV con on admission noted no abd path, questionable PE but CTA cehst with no PE and marthaley chronic airway changes/atalectasis/emphysema  -Still having abd pain localized mostly to left upper quadrant, rest of abdomen soft, nontender and nondistended, does not appear to be in acute abdomen  -Discussed with ID Dr. Steve, yesterday was clinically much worse with severe abd pain, he is concerned for persistent lactic acidosis, repeat CT abdomen with PO contrast   -KUB this morning nonobstructive bowel gas pattern   -trend lactate  -c/w rocephin and flagyl  -bcx with staph epi lacey contaminant per ID, f/u repeat cultures in lab   -keep NPO for now  -gentle IV hydration for now   -dvt ppx heparin

## 2025-06-21 LAB
-  CLINDAMYCIN: SIGNIFICANT CHANGE UP
-  ERYTHROMYCIN: SIGNIFICANT CHANGE UP
-  GENTAMICIN: SIGNIFICANT CHANGE UP
-  OXACILLIN: SIGNIFICANT CHANGE UP
-  PENICILLIN: SIGNIFICANT CHANGE UP
-  RIFAMPIN: SIGNIFICANT CHANGE UP
-  TETRACYCLINE: SIGNIFICANT CHANGE UP
-  TRIMETHOPRIM/SULFAMETHOXAZOLE: SIGNIFICANT CHANGE UP
-  VANCOMYCIN: SIGNIFICANT CHANGE UP
A1C WITH ESTIMATED AVERAGE GLUCOSE RESULT: 6 % — HIGH (ref 4–5.6)
ALBUMIN SERPL ELPH-MCNC: 4.1 G/DL — SIGNIFICANT CHANGE UP (ref 3.5–5.2)
ALP SERPL-CCNC: 66 U/L — SIGNIFICANT CHANGE UP (ref 30–115)
ALT FLD-CCNC: 17 U/L — SIGNIFICANT CHANGE UP (ref 0–41)
ANION GAP SERPL CALC-SCNC: 10 MMOL/L — SIGNIFICANT CHANGE UP (ref 7–14)
APTT BLD: 30 SEC — SIGNIFICANT CHANGE UP (ref 27–39.2)
AST SERPL-CCNC: 21 U/L — SIGNIFICANT CHANGE UP (ref 0–41)
BASOPHILS # BLD AUTO: 0.03 K/UL — SIGNIFICANT CHANGE UP (ref 0–0.2)
BASOPHILS NFR BLD AUTO: 0.2 % — SIGNIFICANT CHANGE UP (ref 0–1)
BILIRUB SERPL-MCNC: 0.3 MG/DL — SIGNIFICANT CHANGE UP (ref 0.2–1.2)
BUN SERPL-MCNC: 36 MG/DL — HIGH (ref 10–20)
CALCIUM SERPL-MCNC: 8.5 MG/DL — SIGNIFICANT CHANGE UP (ref 8.4–10.5)
CHLORIDE SERPL-SCNC: 104 MMOL/L — SIGNIFICANT CHANGE UP (ref 98–110)
CO2 SERPL-SCNC: 22 MMOL/L — SIGNIFICANT CHANGE UP (ref 17–32)
CREAT SERPL-MCNC: 2 MG/DL — HIGH (ref 0.7–1.5)
CULTURE RESULTS: ABNORMAL
EGFR: 34 ML/MIN/1.73M2 — LOW
EGFR: 34 ML/MIN/1.73M2 — LOW
EOSINOPHIL # BLD AUTO: 0 K/UL — SIGNIFICANT CHANGE UP (ref 0–0.7)
EOSINOPHIL NFR BLD AUTO: 0 % — SIGNIFICANT CHANGE UP (ref 0–8)
ESTIMATED AVERAGE GLUCOSE: 126 MG/DL — HIGH (ref 68–114)
GLUCOSE SERPL-MCNC: 129 MG/DL — HIGH (ref 70–99)
HCT VFR BLD CALC: 41.8 % — LOW (ref 42–52)
HGB BLD-MCNC: 13.6 G/DL — LOW (ref 14–18)
IMM GRANULOCYTES NFR BLD AUTO: 1.4 % — HIGH (ref 0.1–0.3)
INR BLD: 0.91 RATIO — SIGNIFICANT CHANGE UP (ref 0.65–1.3)
LACTATE SERPL-SCNC: 1.3 MMOL/L — SIGNIFICANT CHANGE UP (ref 0.7–2)
LACTATE SERPL-SCNC: 2.2 MMOL/L — HIGH (ref 0.7–2)
LYMPHOCYTES # BLD AUTO: 0.84 K/UL — LOW (ref 1.2–3.4)
LYMPHOCYTES # BLD AUTO: 5.8 % — LOW (ref 20.5–51.1)
MAGNESIUM SERPL-MCNC: 2.2 MG/DL — SIGNIFICANT CHANGE UP (ref 1.8–2.4)
MCHC RBC-ENTMCNC: 29.8 PG — SIGNIFICANT CHANGE UP (ref 27–31)
MCHC RBC-ENTMCNC: 32.5 G/DL — SIGNIFICANT CHANGE UP (ref 32–37)
MCV RBC AUTO: 91.7 FL — SIGNIFICANT CHANGE UP (ref 80–94)
METHOD TYPE: SIGNIFICANT CHANGE UP
MONOCYTES # BLD AUTO: 0.95 K/UL — HIGH (ref 0.1–0.6)
MONOCYTES NFR BLD AUTO: 6.6 % — SIGNIFICANT CHANGE UP (ref 1.7–9.3)
NEUTROPHILS # BLD AUTO: 12.39 K/UL — HIGH (ref 1.4–6.5)
NEUTROPHILS NFR BLD AUTO: 86 % — HIGH (ref 42.2–75.2)
NRBC BLD AUTO-RTO: 0 /100 WBCS — SIGNIFICANT CHANGE UP (ref 0–0)
ORGANISM # SPEC MICROSCOPIC CNT: ABNORMAL
ORGANISM # SPEC MICROSCOPIC CNT: SIGNIFICANT CHANGE UP
PHOSPHATE SERPL-MCNC: 4.1 MG/DL — SIGNIFICANT CHANGE UP (ref 2.1–4.9)
PLATELET # BLD AUTO: 210 K/UL — SIGNIFICANT CHANGE UP (ref 130–400)
PMV BLD: 10.9 FL — HIGH (ref 7.4–10.4)
POTASSIUM SERPL-MCNC: 5.4 MMOL/L — HIGH (ref 3.5–5)
POTASSIUM SERPL-SCNC: 5.4 MMOL/L — HIGH (ref 3.5–5)
PROT SERPL-MCNC: 6.3 G/DL — SIGNIFICANT CHANGE UP (ref 6–8)
PROTHROM AB SERPL-ACNC: 10.7 SEC — SIGNIFICANT CHANGE UP (ref 9.95–12.87)
RBC # BLD: 4.56 M/UL — LOW (ref 4.7–6.1)
RBC # FLD: 14.1 % — SIGNIFICANT CHANGE UP (ref 11.5–14.5)
SODIUM SERPL-SCNC: 136 MMOL/L — SIGNIFICANT CHANGE UP (ref 135–146)
SPECIMEN SOURCE: SIGNIFICANT CHANGE UP
WBC # BLD: 14.41 K/UL — HIGH (ref 4.8–10.8)
WBC # FLD AUTO: 14.41 K/UL — HIGH (ref 4.8–10.8)

## 2025-06-21 PROCEDURE — 99233 SBSQ HOSP IP/OBS HIGH 50: CPT

## 2025-06-21 PROCEDURE — 71045 X-RAY EXAM CHEST 1 VIEW: CPT | Mod: 26

## 2025-06-21 PROCEDURE — 99232 SBSQ HOSP IP/OBS MODERATE 35: CPT

## 2025-06-21 PROCEDURE — 74019 RADEX ABDOMEN 2 VIEWS: CPT | Mod: 26

## 2025-06-21 PROCEDURE — 93970 EXTREMITY STUDY: CPT | Mod: 26

## 2025-06-21 PROCEDURE — 99291 CRITICAL CARE FIRST HOUR: CPT

## 2025-06-21 RX ORDER — VANCOMYCIN HCL IN 5 % DEXTROSE 1.5G/250ML
PLASTIC BAG, INJECTION (ML) INTRAVENOUS
Refills: 0 | Status: DISCONTINUED | OUTPATIENT
Start: 2025-06-21 | End: 2025-06-22

## 2025-06-21 RX ORDER — VANCOMYCIN HCL IN 5 % DEXTROSE 1.5G/250ML
1250 PLASTIC BAG, INJECTION (ML) INTRAVENOUS ONCE
Refills: 0 | Status: COMPLETED | OUTPATIENT
Start: 2025-06-21 | End: 2025-06-21

## 2025-06-21 RX ORDER — IPRATROPIUM BROMIDE AND ALBUTEROL SULFATE .5; 2.5 MG/3ML; MG/3ML
3 SOLUTION RESPIRATORY (INHALATION) ONCE
Refills: 0 | Status: COMPLETED | OUTPATIENT
Start: 2025-06-21 | End: 2025-06-21

## 2025-06-21 RX ORDER — LACTULOSE 10 G/15ML
200 SOLUTION ORAL ONCE
Refills: 0 | Status: DISCONTINUED | OUTPATIENT
Start: 2025-06-21 | End: 2025-06-22

## 2025-06-21 RX ORDER — VANCOMYCIN HCL IN 5 % DEXTROSE 1.5G/250ML
1250 PLASTIC BAG, INJECTION (ML) INTRAVENOUS EVERY 24 HOURS
Refills: 0 | Status: DISCONTINUED | OUTPATIENT
Start: 2025-06-22 | End: 2025-06-22

## 2025-06-21 RX ORDER — SODIUM ZIRCONIUM CYCLOSILICATE 5 G/5G
10 POWDER, FOR SUSPENSION ORAL ONCE
Refills: 0 | Status: COMPLETED | OUTPATIENT
Start: 2025-06-21 | End: 2025-06-21

## 2025-06-21 RX ORDER — PIPERACILLIN-TAZO-DEXTROSE,ISO 3.375G/5
3.38 IV SOLUTION, PIGGYBACK PREMIX FROZEN(ML) INTRAVENOUS EVERY 8 HOURS
Refills: 0 | Status: COMPLETED | OUTPATIENT
Start: 2025-06-21 | End: 2025-06-28

## 2025-06-21 RX ORDER — PIPERACILLIN-TAZO-DEXTROSE,ISO 3.375G/5
3.38 IV SOLUTION, PIGGYBACK PREMIX FROZEN(ML) INTRAVENOUS ONCE
Refills: 0 | Status: COMPLETED | OUTPATIENT
Start: 2025-06-21 | End: 2025-06-21

## 2025-06-21 RX ORDER — HYDROMORPHONE/SOD CHLOR,ISO/PF 2 MG/10 ML
0.2 SYRINGE (ML) INJECTION ONCE
Refills: 0 | Status: DISCONTINUED | OUTPATIENT
Start: 2025-06-21 | End: 2025-06-21

## 2025-06-21 RX ORDER — METOCLOPRAMIDE HCL 10 MG
10 TABLET ORAL THREE TIMES A DAY
Refills: 0 | Status: DISCONTINUED | OUTPATIENT
Start: 2025-06-21 | End: 2025-06-25

## 2025-06-21 RX ORDER — FOLIC ACID 1 MG/1
1 TABLET ORAL DAILY
Refills: 0 | Status: DISCONTINUED | OUTPATIENT
Start: 2025-06-21 | End: 2025-06-26

## 2025-06-21 RX ORDER — IPRATROPIUM BROMIDE AND ALBUTEROL SULFATE .5; 2.5 MG/3ML; MG/3ML
3 SOLUTION RESPIRATORY (INHALATION) EVERY 6 HOURS
Refills: 0 | Status: DISCONTINUED | OUTPATIENT
Start: 2025-06-21 | End: 2025-06-23

## 2025-06-21 RX ADMIN — HEPARIN SODIUM 5000 UNIT(S): 1000 INJECTION INTRAVENOUS; SUBCUTANEOUS at 06:09

## 2025-06-21 RX ADMIN — Medication 25 GRAM(S): at 23:00

## 2025-06-21 RX ADMIN — HEPARIN SODIUM 5000 UNIT(S): 1000 INJECTION INTRAVENOUS; SUBCUTANEOUS at 22:59

## 2025-06-21 RX ADMIN — METHYLPREDNISOLONE ACETATE 40 MILLIGRAM(S): 80 INJECTION, SUSPENSION INTRA-ARTICULAR; INTRALESIONAL; INTRAMUSCULAR; SOFT TISSUE at 06:05

## 2025-06-21 RX ADMIN — Medication 0.2 MILLIGRAM(S): at 11:28

## 2025-06-21 RX ADMIN — Medication 0.2 MILLIGRAM(S): at 06:54

## 2025-06-21 RX ADMIN — Medication 0.5 MILLIGRAM(S): at 18:28

## 2025-06-21 RX ADMIN — Medication 0.2 MILLIGRAM(S): at 12:27

## 2025-06-21 RX ADMIN — Medication 0.2 MILLIGRAM(S): at 13:00

## 2025-06-21 RX ADMIN — Medication 10 MILLIGRAM(S): at 22:59

## 2025-06-21 RX ADMIN — Medication 200 GRAM(S): at 10:48

## 2025-06-21 RX ADMIN — Medication 25 GRAM(S): at 16:00

## 2025-06-21 RX ADMIN — Medication 0.2 MILLIGRAM(S): at 07:47

## 2025-06-21 RX ADMIN — Medication 166.67 MILLIGRAM(S): at 12:03

## 2025-06-21 RX ADMIN — Medication 10 MILLIGRAM(S): at 16:01

## 2025-06-21 RX ADMIN — HEPARIN SODIUM 5000 UNIT(S): 1000 INJECTION INTRAVENOUS; SUBCUTANEOUS at 16:00

## 2025-06-21 RX ADMIN — IPRATROPIUM BROMIDE AND ALBUTEROL SULFATE 3 MILLILITER(S): .5; 2.5 SOLUTION RESPIRATORY (INHALATION) at 12:44

## 2025-06-21 RX ADMIN — Medication 10 MILLIGRAM(S): at 16:07

## 2025-06-21 RX ADMIN — Medication 10 MILLIGRAM(S): at 10:52

## 2025-06-21 RX ADMIN — Medication 10 MILLIGRAM(S): at 06:05

## 2025-06-21 RX ADMIN — Medication 1 APPLICATION(S): at 06:14

## 2025-06-21 RX ADMIN — Medication 100 MILLIGRAM(S): at 06:03

## 2025-06-21 RX ADMIN — NICOTINE POLACRILEX 1 PATCH: 4 GUM, CHEWING ORAL at 19:40

## 2025-06-21 RX ADMIN — NICOTINE POLACRILEX 1 PATCH: 4 GUM, CHEWING ORAL at 11:09

## 2025-06-21 RX ADMIN — Medication 0.2 MILLIGRAM(S): at 11:03

## 2025-06-21 RX ADMIN — NICOTINE POLACRILEX 1 PATCH: 4 GUM, CHEWING ORAL at 08:23

## 2025-06-21 RX ADMIN — IPRATROPIUM BROMIDE AND ALBUTEROL SULFATE 3 MILLILITER(S): .5; 2.5 SOLUTION RESPIRATORY (INHALATION) at 15:06

## 2025-06-21 RX ADMIN — NICOTINE POLACRILEX 1 PATCH: 4 GUM, CHEWING ORAL at 12:29

## 2025-06-21 RX ADMIN — IPRATROPIUM BROMIDE AND ALBUTEROL SULFATE 3 MILLILITER(S): .5; 2.5 SOLUTION RESPIRATORY (INHALATION) at 20:20

## 2025-06-21 RX ADMIN — METHYLPREDNISOLONE ACETATE 40 MILLIGRAM(S): 80 INJECTION, SUSPENSION INTRA-ARTICULAR; INTRALESIONAL; INTRAMUSCULAR; SOFT TISSUE at 17:26

## 2025-06-21 NOTE — PROGRESS NOTE ADULT - ASSESSMENT
· Assessment	  79-year-old male with past medical history of HTN, MDD, schizophrenia, dementia presents to the ED for left lower quadrant abdominal pain associated shortness of breath and cough. pt states he has been having this sudden onset of SOB and cough with deep inspiration. States he is an active smoker. Vitals on admission significant for BP of 200/100 and 98% on 2L NC.   RR called overnight for AHRF now on JFNC, upgraded to SDU.    Acute hypoxemic resp failure on HHFNC/ NIV  Possible Aspiration event?  COPD exacerbation  Left side Abd pain  Acute on chronic renal failure  Lactic acidosis  HO HTN, MDD, schizophrenia, dementia     -RR overnight noted, upgraded to SDU  -now on HFNC 50/50, wean as tolerated  -CT abdomen wIV con on admission noted no abd path, questionable PE but CTA cehst with no PE and lacey chronic airway changes/atalectasis/emphysema  -Still having abd pain localized mostly to left upper quadrant, rest of abdomen soft, nontender and nondistended, does not appear to be in acute abdomen  -Discussed with ID Dr. Steve, yesterday was clinically much worse with severe abd pain, he is concerned for persistent lactic acidosis, repeat CT abdomen with PO contrast   -KUB this morning nonobstructive bowel gas pattern   -trend lactate  -c/w rocephin and flagyl  -bcx with staph epi lacey contaminant per ID, f/u repeat cultures in lab   -keep NPO for now  -gentle IV hydration for now   -dvt ppx heparin   79-year-old male with past medical history of HTN, MDD, schizophrenia, dementia presents to the ED for left lower quadrant abdominal pain associated shortness of breath and cough. pt states he has been having this sudden onset of SOB and cough with deep inspiration. States he is an active smoker. Vitals on admission significant for BP of 200/100 and 98% on 2L NC.   RR called overnight for AHRF now on JFNC, upgraded to SDU.    Acute hypoxemic resp failure on HHFNC/ NIV  Aspiration PNA  COPD exacerbation  Left side Abd pain  Acute on chronic renal failure  Lactic acidosis  HO HTN, MDD, schizophrenia, dementia     - s/p RR 6/19, upgraded to SDU  - CTA Chest: Negative for pulmonary emboli. Emphysematous changes. Bibasilar atelectasis. Areas of ground glass attenuation which could reflect air trapping/airway disease.  - CT A/P: no acute abdominal findings  - NIV 4 hrs on/off, alternate with HFNC  - continuing to have abdominal pain, KUB 6/20 showing large fecal burden in ascending colon. per discussion with ID Dr. Steve, recommended CT A/P with PO contrast  - Gen Surg following, further plans awaiting CT A/P imaging  - Still having abd pain localized mostly to left upper quadrant, rest of abdomen soft, nontender and nondistended, does not appear to be in acute abdomen  - transitioned to HFNC today, administer PO contrast to obtain CT A/P  - bowel regimen: miralax, senna  - broaden to vancomycin/zosyn per pulm/CC as CXR reviewed 6/21 showing another aspiration event overnight  - aspiration precautions, HOB 45 degrees. Reglan. Feeding per S/S eval      DVT ppx: heparin subQ  Full Code    Pending/Handoff: CT A/P w/ oral cont: wean O2, surg f/u, ID f/u 79-year-old male with past medical history of HTN, MDD, schizophrenia, dementia presents to the ED for left lower quadrant abdominal pain associated shortness of breath and cough. pt states he has been having this sudden onset of SOB and cough with deep inspiration. States he is an active smoker. Vitals on admission significant for BP of 200/100 and 98% on 2L NC.   RR called overnight for AHRF now on JFNC, upgraded to SDU.    Acute hypoxemic resp failure on HHFNC/ NIV  Aspiration PNA  COPD exacerbation  Left side Abd pain  Acute on chronic renal failure  Lactic acidosis  HO HTN, MDD, schizophrenia, dementia     - s/p RR 6/19, upgraded to SDU  - CTA Chest: Negative for pulmonary emboli. Emphysematous changes. Bibasilar atelectasis. Areas of ground glass attenuation which could reflect air trapping/airway disease.  - CT A/P: no acute abdominal findings  - NIV 4 hrs on/off, alternate with HFNC  - continuing to have abdominal pain, KUB 6/20 showing large fecal burden in ascending colon. per discussion with ID Dr. Steve, recommended CT A/P with PO contrast  - Gen Surg following, further plans awaiting CT A/P imaging  - Still having abd pain localized mostly to left upper quadrant as well as the epigastric area, rest of abdomen soft, nontender does not appear to be in acute abdomen  - CT AP cancelled reschedule when able to take oral contrast.  - bowel regimen: miralax, senna  - broaden to vancomycin/zosyn per pulm/CC as CXR reviewed 6/21 showing another aspiration event overnight  - aspiration precautions, HOB 45 degrees. Reglan. Feeding per S/S eval  - Patient unable to take Oral meds, I transitioned to IV meds where possible.      DVT ppx: heparin subQ  Full Code    Pending/Handoff: CT A/P w/ oral cont: wean O2, surg f/u, ID f/u

## 2025-06-21 NOTE — PROGRESS NOTE ADULT - ATTENDING COMMENTS
IMPRESSION:    Acute hypoxemic resp failure on HHFNC/ NIV  Aspiration PNA  COPD exacerbation  Abd pain CT AP on admission noted  Acute on chronic renal failure  Lactic acidosis improved  HO HTN, MDD, schizophrenia, dementia     Plan as outlined above

## 2025-06-21 NOTE — PROGRESS NOTE ADULT - SUBJECTIVE AND OBJECTIVE BOX
ACUTE CARE SURGERY PROGRESS NOTE     Patient: ANILA HORTON , 79y ([birth year])Male   MRN: [id number]  Location: [address]  Visit: 06-17-25 Inpatient  Date: 06-21-25 @ 05:22        Admitted :06-17-25 (4d)  LOS: 4d    Procedure/Dx/Injuries: COPD exacerbation        Events of past 24 hours:   Patient seen and examined at bedside.   Wean to HFNC from BiPAP.  No acute events overnight. Afebrile, VSS.  >>> <<<>>> <<<>>> <<<>>> <<<>>> <<<>>> <<<>>> <<<>>> <<<>>> <<<>>> <<<    Vitals:   T(F): 96.6 (06-21-25 @ 04:00), Max: 98.4 (06-20-25 @ 08:00)  HR: 79 (06-21-25 @ 04:00)  BP: 119/56 (06-21-25 @ 04:00)  RR: 18 (06-21-25 @ 04:00)  SpO2: 98% (06-21-25 @ 04:00)      PHYSICAL EXAM:  General: On HFNC 50/60  HEENT: NCAT  Cardiac: RRR   Respiratory: Symmetric chest rise and fall on HFNC  Abdomen: Soft, non-distended, tender in LLQ, no rebound   Musculoskeletal: Strength 5/5 BL UE/LE, ROM intact, compartments soft  Skin: Warm/dry, normal color, no jaundice  >>> <<<>>> <<<>>> <<<>>> <<<>>> <<<>>> <<<>>> <<<>>> <<<>>> <<<>>> <<<    Is & Os:   Diet, NPO    Fluids: lactated ringers.: Solution, 500 milliLiter(s) infuse at 100 mL/Hr, Stop After 12 Hours  >>> <<<>>> <<<>>> <<<>>> <<<>>> <<<>>> <<<>>> <<<>>> <<<>>> <<<>>> <<<    MEDICATIONS  (STANDING):  albuterol    90 MICROgram(s) HFA Inhaler 2 Puff(s) Inhalation two times a day  amLODIPine   Tablet 10 milliGRAM(s) Oral daily  atorvastatin 40 milliGRAM(s) Oral at bedtime  buPROPion XL (24-Hour) . 150 milliGRAM(s) Oral daily  cefTRIAXone   IVPB 2000 milliGRAM(s) IV Intermittent every 24 hours  chlorhexidine 2% Cloths 1 Application(s) Topical <User Schedule>  diatrizoate meglumine/diatrizoate sodium. 30 milliLiter(s) Oral once  fluPHENAZine 10 milliGRAM(s) Oral daily  fluticasone propionate/ salmeterol 500-50 MICROgram(s) Diskus 1 Dose(s) Inhalation two times a day  folic acid 1 milliGRAM(s) Oral daily  guaiFENesin Oral Liquid (Sugar-Free) 100 milliGRAM(s) Oral every 6 hours  heparin   Injectable 5000 Unit(s) SubCutaneous every 8 hours  hydrALAZINE Injectable 10 milliGRAM(s) IV Push three times a day  lactated ringers. 500 milliLiter(s) (100 mL/Hr) IV Continuous <Continuous>  memantine 5 milliGRAM(s) Oral at bedtime  methylPREDNISolone sodium succinate Injectable 40 milliGRAM(s) IV Push every 12 hours  metroNIDAZOLE  IVPB 500 milliGRAM(s) IV Intermittent every 8 hours  nicotine -  14 mG/24Hr(s) Patch 1 Patch Transdermal daily  OLANZapine 20 milliGRAM(s) Oral at bedtime  pantoprazole    Tablet 40 milliGRAM(s) Oral before breakfast  polyethylene glycol 3350 17 Gram(s) Oral two times a day  senna 2 Tablet(s) Oral at bedtime  thiamine 100 milliGRAM(s) Oral daily    MEDICATIONS  (PRN):  acetaminophen     Tablet .. 650 milliGRAM(s) Oral every 6 hours PRN Mild Pain (1 - 3), Moderate Pain (4 - 6)  albuterol/ipratropium for Nebulization 3 milliLiter(s) Nebulizer every 6 hours PRN Shortness of Breath and/or Wheezing  LORazepam   Injectable 0.5 milliGRAM(s) IV Push every 8 hours PRN Agitation/anxiety  melatonin 3 milliGRAM(s) Oral at bedtime PRN Insomnia  metoclopramide Injectable 10 milliGRAM(s) IV Push three times a day PRN nausea  ondansetron Injectable 4 milliGRAM(s) IV Push every 8 hours PRN Nausea and/or Vomiting      DVT PROPHYLAXIS: heparin   Injectable 5000 Unit(s) SubCutaneous every 8 hours    GI PROPHYLAXIS: pantoprazole    Tablet 40 milliGRAM(s) Oral before breakfast    ANTIBIOTICS:  cefTRIAXone   IVPB 2000 milliGRAM(s)  metroNIDAZOLE  IVPB 500 milliGRAM(s)      >>> <<<>>> <<<>>> <<<>>> <<<>>> <<<>>> <<<>>> <<<>>> <<<>>> <<<>>> <<<        LAB/STUDIES:  Labs:               14.0   14.86 )-----------( 211      ( 20 Jun 2025 11:25 )             42.6       Auto Immature Granulocyte %: 1.3 % (06-20-25 @ 11:25)    06-20    132[L]  |  100  |  36[H]  ----------------------------<  165[H]  5.0   |  19  |  2.2[H]      Calcium: 8.7 mg/dL (06-20-25 @ 11:25)      LFTs:             6.8  | 0.3  | 20       ------------------[76      ( 20 Jun 2025 11:25 )  4.2  | x    | 21               Lactate, Blood: 2.2 mmol/L (06-21-25 @ 01:18)  Lactate, Blood: 2.8 mmol/L (06-20-25 @ 17:40)  Blood Gas Arterial, Lactate: 4.0 mmol/L (06-20-25 @ 13:22)  Lactate, Blood: 3.0 mmol/L (06-20-25 @ 11:25)  Lactate, Blood: 4.6 mmol/L (06-20-25 @ 05:31)  Blood Gas Arterial, Lactate: 2.6 mmol/L (06-20-25 @ 00:16)  Lactate, Blood: 2.8 mmol/L (06-19-25 @ 22:30)    ABG - ( 20 Jun 2025 13:22 )  pH: 7.36  /  pCO2: 35    /  pO2: 116   / HCO3: 20    / Base Excess: -4.9  /  SaO2: 98.2            ABG - ( 20 Jun 2025 00:16 )  pH: 7.39  /  pCO2: 32    /  pO2: 197   / HCO3: 19    / Base Excess: -4.5  /  SaO2: 99.0              Coags:     10.70  ----< 0.91    ( 19 Jun 2025 22:29 )     25.5                Urinalysis Basic - ( 20 Jun 2025 11:25 )    Color: x / Appearance: x / SG: x / pH: x  Gluc: 165 mg/dL / Ketone: x  / Bili: x / Urobili: x   Blood: x / Protein: x / Nitrite: x   Leuk Esterase: x / RBC: x / WBC x   Sq Epi: x / Non Sq Epi: x / Bacteria: x        Culture - Blood (collected 19 Jun 2025 22:29)  Source: Blood Blood-Peripheral  Preliminary Report (21 Jun 2025 03:02):    No growth at 24 hours    Culture - Blood (collected 19 Jun 2025 07:55)  Source: Blood None  Preliminary Report (20 Jun 2025 17:01):    No growth at 24 hours

## 2025-06-21 NOTE — PROGRESS NOTE ADULT - SUBJECTIVE AND OBJECTIVE BOX
Patient is a 79y old  Male who presents with a chief complaint of SOB (21 Jun 2025 05:22)      Over Night Events:        ROS:     All ROS are negative except HPI         PHYSICAL EXAM    ICU Vital Signs Last 24 Hrs  T(C): 35.9 (21 Jun 2025 04:00), Max: 36.7 (20 Jun 2025 11:26)  T(F): 96.6 (21 Jun 2025 04:00), Max: 98.1 (20 Jun 2025 11:26)  HR: 79 (21 Jun 2025 04:00) (79 - 100)  BP: 119/56 (21 Jun 2025 04:00) (119/56 - 190/86)  BP(mean): 81 (21 Jun 2025 04:00) (81 - 123)  ABP: --  ABP(mean): --  RR: 18 (21 Jun 2025 04:00) (18 - 18)  SpO2: 98% (21 Jun 2025 04:00) (82% - 98%)        CONSTITUTIONAL:  NAD    ENT:   Airway patent,   Mouth with normal mucosa.   No thrush    EYES:   Pupils equal,   Round and reactive to light.    CARDIAC:   Normal rate,   Regular rhythm.    No edema    RESPIRATORY:   No wheezing  Bilateral BS  Normal chest expansion  Not tachypneic,  No use of accessory muscles    GASTROINTESTINAL:  Abdomen soft,   Non-tender,   No guarding,   + BS    MUSCULOSKELETAL:   Range of motion is not limited,  No clubbing, cyanosis    NEUROLOGICAL:   Alert and oriented   No motor  deficits.    SKIN:   Skin normal color for race,   Warm and dry and intact.   No evidence of rash.        06-20-25 @ 07:01  -  06-21-25 @ 07:00  --------------------------------------------------------  IN:    Lactated Ringers: 900 mL  Total IN: 900 mL    OUT:    Voided (mL): 1370 mL  Total OUT: 1370 mL    Total NET: -470 mL          LABS:                            13.6   14.41 )-----------( 210      ( 21 Jun 2025 05:00 )             41.8                                               06-21    136  |  104  |  36[H]  ----------------------------<  129[H]  5.4[H]   |  22  |  2.0[H]    Ca    8.5      21 Jun 2025 05:00  Phos  4.1     06-21  Mg     2.2     06-21    TPro  6.3  /  Alb  4.1  /  TBili  0.3  /  DBili  x   /  AST  21  /  ALT  17  /  AlkPhos  66  06-21      PT/INR - ( 21 Jun 2025 05:00 )   PT: 10.70 sec;   INR: 0.91 ratio         PTT - ( 21 Jun 2025 05:00 )  PTT:30.0 sec                                       Urinalysis Basic - ( 21 Jun 2025 05:00 )    Color: x / Appearance: x / SG: x / pH: x  Gluc: 129 mg/dL / Ketone: x  / Bili: x / Urobili: x   Blood: x / Protein: x / Nitrite: x   Leuk Esterase: x / RBC: x / WBC x   Sq Epi: x / Non Sq Epi: x / Bacteria: x                                                  LIVER FUNCTIONS - ( 21 Jun 2025 05:00 )  Alb: 4.1 g/dL / Pro: 6.3 g/dL / ALK PHOS: 66 U/L / ALT: 17 U/L / AST: 21 U/L / GGT: x                                                  Culture - Blood (collected 19 Jun 2025 22:29)  Source: Blood Blood-Peripheral  Preliminary Report (21 Jun 2025 03:02):    No growth at 24 hours    Culture - Blood (collected 19 Jun 2025 07:55)  Source: Blood None  Preliminary Report (20 Jun 2025 17:01):    No growth at 24 hours                                                                                       ABG - ( 20 Jun 2025 13:22 )  pH, Arterial: 7.36  pH, Blood: x     /  pCO2: 35    /  pO2: 116   / HCO3: 20    / Base Excess: -4.9  /  SaO2: 98.2                MEDICATIONS  (STANDING):  albuterol    90 MICROgram(s) HFA Inhaler 2 Puff(s) Inhalation two times a day  amLODIPine   Tablet 10 milliGRAM(s) Oral daily  atorvastatin 40 milliGRAM(s) Oral at bedtime  buPROPion XL (24-Hour) . 150 milliGRAM(s) Oral daily  cefTRIAXone   IVPB 2000 milliGRAM(s) IV Intermittent every 24 hours  chlorhexidine 2% Cloths 1 Application(s) Topical <User Schedule>  diatrizoate meglumine/diatrizoate sodium. 30 milliLiter(s) Oral once  fluPHENAZine 10 milliGRAM(s) Oral daily  fluticasone propionate/ salmeterol 500-50 MICROgram(s) Diskus 1 Dose(s) Inhalation two times a day  folic acid 1 milliGRAM(s) Oral daily  guaiFENesin Oral Liquid (Sugar-Free) 100 milliGRAM(s) Oral every 6 hours  heparin   Injectable 5000 Unit(s) SubCutaneous every 8 hours  hydrALAZINE Injectable 10 milliGRAM(s) IV Push three times a day  lactated ringers. 500 milliLiter(s) (100 mL/Hr) IV Continuous <Continuous>  memantine 5 milliGRAM(s) Oral at bedtime  methylPREDNISolone sodium succinate Injectable 40 milliGRAM(s) IV Push every 12 hours  metroNIDAZOLE  IVPB 500 milliGRAM(s) IV Intermittent every 8 hours  nicotine -  14 mG/24Hr(s) Patch 1 Patch Transdermal daily  OLANZapine 20 milliGRAM(s) Oral at bedtime  pantoprazole    Tablet 40 milliGRAM(s) Oral before breakfast  polyethylene glycol 3350 17 Gram(s) Oral two times a day  senna 2 Tablet(s) Oral at bedtime  sodium zirconium cyclosilicate 10 Gram(s) Oral once  thiamine 100 milliGRAM(s) Oral daily    MEDICATIONS  (PRN):  acetaminophen     Tablet .. 650 milliGRAM(s) Oral every 6 hours PRN Mild Pain (1 - 3), Moderate Pain (4 - 6)  albuterol/ipratropium for Nebulization 3 milliLiter(s) Nebulizer every 6 hours PRN Shortness of Breath and/or Wheezing  LORazepam   Injectable 0.5 milliGRAM(s) IV Push every 8 hours PRN Agitation/anxiety  melatonin 3 milliGRAM(s) Oral at bedtime PRN Insomnia  metoclopramide Injectable 10 milliGRAM(s) IV Push three times a day PRN nausea  ondansetron Injectable 4 milliGRAM(s) IV Push every 8 hours PRN Nausea and/or Vomiting      New X-rays reviewed:                                                                                  ECHO    CXR interpreted by me:       Patient is a 79y old  Male who presents with a chief complaint of SOB (21 Jun 2025 05:22)      Over Night Events: Remains on NIV.  C/o midline abdominal Pain.        ROS:     All ROS are negative except HPI         PHYSICAL EXAM    ICU Vital Signs Last 24 Hrs  T(C): 35.9 (21 Jun 2025 04:00), Max: 36.7 (20 Jun 2025 11:26)  T(F): 96.6 (21 Jun 2025 04:00), Max: 98.1 (20 Jun 2025 11:26)  HR: 79 (21 Jun 2025 04:00) (79 - 100)  BP: 119/56 (21 Jun 2025 04:00) (119/56 - 190/86)  BP(mean): 81 (21 Jun 2025 04:00) (81 - 123)  ABP: --  ABP(mean): --  RR: 18 (21 Jun 2025 04:00) (18 - 18)  SpO2: 98% (21 Jun 2025 04:00) (82% - 98%)        CONSTITUTIONAL:  NAD    ENT:   On NIV    EYES:   Pupils equal,   Round and reactive to light.    CARDIAC:   Normal rate,   Regular rhythm.    No edema    RESPIRATORY:   Bilateral mechancial BS  Normal chest expansion  Not tachypneic,  No use of accessory muscles    GASTROINTESTINAL:  Abdomen soft,   Epigastric tenderness    MUSCULOSKELETAL:   Range of motion is not limited,  No clubbing, cyanosis    NEUROLOGICAL:   Alert     SKIN:   Skin normal color for race,         06-20-25 @ 07:01  -  06-21-25 @ 07:00  --------------------------------------------------------  IN:    Lactated Ringers: 900 mL  Total IN: 900 mL    OUT:    Voided (mL): 1370 mL  Total OUT: 1370 mL    Total NET: -470 mL          LABS:                            13.6   14.41 )-----------( 210      ( 21 Jun 2025 05:00 )             41.8                                               06-21    136  |  104  |  36[H]  ----------------------------<  129[H]  5.4[H]   |  22  |  2.0[H]    Ca    8.5      21 Jun 2025 05:00  Phos  4.1     06-21  Mg     2.2     06-21    TPro  6.3  /  Alb  4.1  /  TBili  0.3  /  DBili  x   /  AST  21  /  ALT  17  /  AlkPhos  66  06-21      PT/INR - ( 21 Jun 2025 05:00 )   PT: 10.70 sec;   INR: 0.91 ratio         PTT - ( 21 Jun 2025 05:00 )  PTT:30.0 sec                                       Urinalysis Basic - ( 21 Jun 2025 05:00 )    Color: x / Appearance: x / SG: x / pH: x  Gluc: 129 mg/dL / Ketone: x  / Bili: x / Urobili: x   Blood: x / Protein: x / Nitrite: x   Leuk Esterase: x / RBC: x / WBC x   Sq Epi: x / Non Sq Epi: x / Bacteria: x                                                  LIVER FUNCTIONS - ( 21 Jun 2025 05:00 )  Alb: 4.1 g/dL / Pro: 6.3 g/dL / ALK PHOS: 66 U/L / ALT: 17 U/L / AST: 21 U/L / GGT: x                                                  Culture - Blood (collected 19 Jun 2025 22:29)  Source: Blood Blood-Peripheral  Preliminary Report (21 Jun 2025 03:02):    No growth at 24 hours    Culture - Blood (collected 19 Jun 2025 07:55)  Source: Blood None  Preliminary Report (20 Jun 2025 17:01):    No growth at 24 hours                                                                                       ABG - ( 20 Jun 2025 13:22 )  pH, Arterial: 7.36  pH, Blood: x     /  pCO2: 35    /  pO2: 116   / HCO3: 20    / Base Excess: -4.9  /  SaO2: 98.2                MEDICATIONS  (STANDING):  albuterol    90 MICROgram(s) HFA Inhaler 2 Puff(s) Inhalation two times a day  amLODIPine   Tablet 10 milliGRAM(s) Oral daily  atorvastatin 40 milliGRAM(s) Oral at bedtime  buPROPion XL (24-Hour) . 150 milliGRAM(s) Oral daily  cefTRIAXone   IVPB 2000 milliGRAM(s) IV Intermittent every 24 hours  chlorhexidine 2% Cloths 1 Application(s) Topical <User Schedule>  diatrizoate meglumine/diatrizoate sodium. 30 milliLiter(s) Oral once  fluPHENAZine 10 milliGRAM(s) Oral daily  fluticasone propionate/ salmeterol 500-50 MICROgram(s) Diskus 1 Dose(s) Inhalation two times a day  folic acid 1 milliGRAM(s) Oral daily  guaiFENesin Oral Liquid (Sugar-Free) 100 milliGRAM(s) Oral every 6 hours  heparin   Injectable 5000 Unit(s) SubCutaneous every 8 hours  hydrALAZINE Injectable 10 milliGRAM(s) IV Push three times a day  lactated ringers. 500 milliLiter(s) (100 mL/Hr) IV Continuous <Continuous>  memantine 5 milliGRAM(s) Oral at bedtime  methylPREDNISolone sodium succinate Injectable 40 milliGRAM(s) IV Push every 12 hours  metroNIDAZOLE  IVPB 500 milliGRAM(s) IV Intermittent every 8 hours  nicotine -  14 mG/24Hr(s) Patch 1 Patch Transdermal daily  OLANZapine 20 milliGRAM(s) Oral at bedtime  pantoprazole    Tablet 40 milliGRAM(s) Oral before breakfast  polyethylene glycol 3350 17 Gram(s) Oral two times a day  senna 2 Tablet(s) Oral at bedtime  sodium zirconium cyclosilicate 10 Gram(s) Oral once  thiamine 100 milliGRAM(s) Oral daily    MEDICATIONS  (PRN):  acetaminophen     Tablet .. 650 milliGRAM(s) Oral every 6 hours PRN Mild Pain (1 - 3), Moderate Pain (4 - 6)  albuterol/ipratropium for Nebulization 3 milliLiter(s) Nebulizer every 6 hours PRN Shortness of Breath and/or Wheezing  LORazepam   Injectable 0.5 milliGRAM(s) IV Push every 8 hours PRN Agitation/anxiety  melatonin 3 milliGRAM(s) Oral at bedtime PRN Insomnia  metoclopramide Injectable 10 milliGRAM(s) IV Push three times a day PRN nausea  ondansetron Injectable 4 milliGRAM(s) IV Push every 8 hours PRN Nausea and/or Vomiting      New X-rays reviewed:                                                                                  ECHO    CXR interpreted by me:

## 2025-06-21 NOTE — PROGRESS NOTE ADULT - ATTENDING COMMENTS
79-year-old male with past medical history of HTN, MDD, schizophrenia, dementia presents to the ED for left lower quadrant abdominal pain associated shortness of breath and cough. pt states he has been having this sudden onset of SOB and cough with deep inspiration. States he is an active smoker. Vitals on admission significant for BP of 200/100 and 98% on 2L NC.   RR called overnight for AHRF now on JFNC, upgraded to SDU.    #Acute hypoxemic resp failure on HHFNC/ NIV  #Aspiration PNA  #COPD exacerbation  #Left side Abd pain 2/2 possible constipation  #Acute on chronic renal failure  #Lactic acidosis  #HO HTN, MDD, schizophrenia, dementia     - CTA Chest: Negative for pulmonary emboli. Emphysematous changes. Bibasilar atelectasis. Areas of ground glass attenuation which could reflect air trapping/airway disease.  - CT A/P: no acute abdominal findings  - NIV 4 hrs on/off, alternate with HFNC  - continuing to have abdominal pain, KUB 6/20 showing large fecal burden in ascending colon. per discussion with ID Dr. Steve, recommended CT A/P with PO contrast  - Gen Surg following, further plans awaiting CT A/P imaging  - transitioned to HFNC today, administer PO contrast to obtain CT A/P  - broaden to vancomycin/zosyn per pulm/CC as CXR reviewed 6/21 showing another aspiration event overnight  - aspiration precautions, HOB 45 degrees. Reglan. Feeding per S/S eval  - bowel regimen    DVT ppx: heparin subQ  Full Code    Pending/Handoff: CT A/P w/ oral cont: wean O2, surg f/u, ID f/u

## 2025-06-21 NOTE — PROGRESS NOTE ADULT - ASSESSMENT
Patient is a 79-year-old male with past medical history of HTN, MDD, schizophrenia, dementia presents to the ED for left lower quadrant abdominal pain associated shortness of breath and cough admitted for URI, blood cultures positive with GPC in pairs. Patient is a poor historian on exam, and only mentions LLQ pain, states no associating symptoms. KUB done 6/20 reveals large colonic fecal burden within the ascending colon and nonspecific bowel gas pattern. Physical exam findings, imaging, and labs as documented above.     PLAN:  - Pending CT A/P   - Pending attending plan  - Rest per primary team     [phone]

## 2025-06-21 NOTE — PROGRESS NOTE ADULT - SUBJECTIVE AND OBJECTIVE BOX
24H events:    Today is hospital day 4d. This morning patient was seen and examined at bedside, resting comfortably in bed.    No acute or major events overnight.    PAST MEDICAL & SURGICAL HISTORY  Schizophrenia    No significant past surgical history        SOCIAL HISTORY:  Social History:      ALLERGIES:  No Known Allergies      MEDICATIONS:  STANDING MEDICATIONS  albuterol    90 MICROgram(s) HFA Inhaler 2 Puff(s) Inhalation two times a day  albuterol/ipratropium for Nebulization 3 milliLiter(s) Nebulizer every 6 hours  amLODIPine   Tablet 10 milliGRAM(s) Oral daily  atorvastatin 40 milliGRAM(s) Oral at bedtime  buPROPion XL (24-Hour) . 150 milliGRAM(s) Oral daily  chlorhexidine 2% Cloths 1 Application(s) Topical <User Schedule>  diatrizoate meglumine/diatrizoate sodium. 30 milliLiter(s) Oral once  fluPHENAZine 10 milliGRAM(s) Oral daily  fluticasone propionate/ salmeterol 500-50 MICROgram(s) Diskus 1 Dose(s) Inhalation two times a day  folic acid 1 milliGRAM(s) Oral daily  guaiFENesin Oral Liquid (Sugar-Free) 100 milliGRAM(s) Oral every 6 hours  heparin   Injectable 5000 Unit(s) SubCutaneous every 8 hours  hydrALAZINE Injectable 10 milliGRAM(s) IV Push three times a day  HYDROmorphone  Injectable 0.2 milliGRAM(s) IV Push once  lactulose Retention Enema 200 Gram(s) Rectal once  memantine 5 milliGRAM(s) Oral at bedtime  methylPREDNISolone sodium succinate Injectable 40 milliGRAM(s) IV Push every 12 hours  metoclopramide Injectable 10 milliGRAM(s) IV Push three times a day  nicotine -  14 mG/24Hr(s) Patch 1 Patch Transdermal daily  OLANZapine 20 milliGRAM(s) Oral at bedtime  pantoprazole    Tablet 40 milliGRAM(s) Oral before breakfast  piperacillin/tazobactam IVPB.- 3.375 Gram(s) IV Intermittent once  piperacillin/tazobactam IVPB.. 3.375 Gram(s) IV Intermittent every 8 hours  polyethylene glycol 3350 17 Gram(s) Oral two times a day  senna 2 Tablet(s) Oral at bedtime  thiamine 100 milliGRAM(s) Oral daily  vancomycin  IVPB      vancomycin  IVPB 1250 milliGRAM(s) IV Intermittent once    PRN MEDICATIONS  acetaminophen     Tablet .. 650 milliGRAM(s) Oral every 6 hours PRN  LORazepam   Injectable 0.5 milliGRAM(s) IV Push every 8 hours PRN  melatonin 3 milliGRAM(s) Oral at bedtime PRN  ondansetron Injectable 4 milliGRAM(s) IV Push every 8 hours PRN      VITALS:   T(C): 36.2 (06-21-25 @ 11:48), Max: 36.2 (06-21-25 @ 11:48)  HR: 100 (06-21-25 @ 11:48) (79 - 100)  BP: 120/70 (06-21-25 @ 11:48) (119/56 - 146/65)  RR: 20 (06-21-25 @ 11:48) (18 - 20)  SpO2: 95% (06-21-25 @ 11:48) (82% - 98%)  I&O's Summary    20 Jun 2025 07:01 - 21 Jun 2025 07:00  --------------------------------------------------------  IN: 900 mL / OUT: 1370 mL / NET: -470 mL    21 Jun 2025 07:01  -  21 Jun 2025 11:54  --------------------------------------------------------  IN: 0 mL / OUT: 300 mL / NET: -300 mL          PHYSICAL EXAM:  GENERAL: well built, well nourished  HEAD:  Atraumatic, Normocephalic  EYES: EOMI, PERRLA, conjunctiva and sclera clear  NECK: Supple, No JVD  NERVOUS SYSTEM:  Alert & Oriented X3,  motor and  sensory intact  CHEST/LUNG: B/L good air entry; No rales, rhonchi, or wheezing  HEART: S1S2 normal, no S3, Regular rate and rhythm; No murmurs heard  ABDOMEN: Soft, Nontender, Nondistended; Bowel sounds present  EXTREMITIES:  2+ Peripheral Pulses, No edema  SKIN: No rashes or lesions    LABS:                        13.6   14.41 )-----------( 210      ( 21 Jun 2025 05:00 )             41.8     06-21    136  |  104  |  36[H]  ----------------------------<  129[H]  5.4[H]   |  22  |  2.0[H]    Ca    8.5      21 Jun 2025 05:00  Phos  4.1     06-21  Mg     2.2     06-21    TPro  6.3  /  Alb  4.1  /  TBili  0.3  /  DBili  x   /  AST  21  /  ALT  17  /  AlkPhos  66  06-21    PT/INR - ( 21 Jun 2025 05:00 )   PT: 10.70 sec;   INR: 0.91 ratio         PTT - ( 21 Jun 2025 05:00 )  PTT:30.0 sec  Urinalysis Basic - ( 21 Jun 2025 05:00 )    Color: x / Appearance: x / SG: x / pH: x  Gluc: 129 mg/dL / Ketone: x  / Bili: x / Urobili: x   Blood: x / Protein: x / Nitrite: x   Leuk Esterase: x / RBC: x / WBC x   Sq Epi: x / Non Sq Epi: x / Bacteria: x      ABG - ( 20 Jun 2025 13:22 )  pH, Arterial: 7.36  pH, Blood: x     /  pCO2: 35    /  pO2: 116   / HCO3: 20    / Base Excess: -4.9  /  SaO2: 98.2              Lactate, Blood: 1.3 mmol/L (06-21-25 @ 05:00)  Lactate, Blood: 2.2 mmol/L *H* (06-21-25 @ 01:18)  Lactate, Blood: 2.8 mmol/L *H* (06-20-25 @ 17:40)      Culture - Blood (collected 19 Jun 2025 22:29)  Source: Blood Blood-Peripheral  Preliminary Report (21 Jun 2025 03:02):    No growth at 24 hours    Culture - Blood (collected 19 Jun 2025 07:55)  Source: Blood None  Preliminary Report (20 Jun 2025 17:01):    No growth at 24 hours               24H events:    Today is hospital day 4d. This morning patient was seen and examined at bedside, resting comfortably in bed.    No acute or major events overnight.    PAST MEDICAL & SURGICAL HISTORY  Schizophrenia    No significant past surgical history        SOCIAL HISTORY:  Social History:      ALLERGIES:  No Known Allergies      MEDICATIONS:  STANDING MEDICATIONS  albuterol    90 MICROgram(s) HFA Inhaler 2 Puff(s) Inhalation two times a day  albuterol/ipratropium for Nebulization 3 milliLiter(s) Nebulizer every 6 hours  amLODIPine   Tablet 10 milliGRAM(s) Oral daily  atorvastatin 40 milliGRAM(s) Oral at bedtime  buPROPion XL (24-Hour) . 150 milliGRAM(s) Oral daily  chlorhexidine 2% Cloths 1 Application(s) Topical <User Schedule>  diatrizoate meglumine/diatrizoate sodium. 30 milliLiter(s) Oral once  fluPHENAZine 10 milliGRAM(s) Oral daily  fluticasone propionate/ salmeterol 500-50 MICROgram(s) Diskus 1 Dose(s) Inhalation two times a day  folic acid 1 milliGRAM(s) Oral daily  guaiFENesin Oral Liquid (Sugar-Free) 100 milliGRAM(s) Oral every 6 hours  heparin   Injectable 5000 Unit(s) SubCutaneous every 8 hours  hydrALAZINE Injectable 10 milliGRAM(s) IV Push three times a day  HYDROmorphone  Injectable 0.2 milliGRAM(s) IV Push once  lactulose Retention Enema 200 Gram(s) Rectal once  memantine 5 milliGRAM(s) Oral at bedtime  methylPREDNISolone sodium succinate Injectable 40 milliGRAM(s) IV Push every 12 hours  metoclopramide Injectable 10 milliGRAM(s) IV Push three times a day  nicotine -  14 mG/24Hr(s) Patch 1 Patch Transdermal daily  OLANZapine 20 milliGRAM(s) Oral at bedtime  pantoprazole    Tablet 40 milliGRAM(s) Oral before breakfast  piperacillin/tazobactam IVPB.- 3.375 Gram(s) IV Intermittent once  piperacillin/tazobactam IVPB.. 3.375 Gram(s) IV Intermittent every 8 hours  polyethylene glycol 3350 17 Gram(s) Oral two times a day  senna 2 Tablet(s) Oral at bedtime  thiamine 100 milliGRAM(s) Oral daily  vancomycin  IVPB      vancomycin  IVPB 1250 milliGRAM(s) IV Intermittent once    PRN MEDICATIONS  acetaminophen     Tablet .. 650 milliGRAM(s) Oral every 6 hours PRN  LORazepam   Injectable 0.5 milliGRAM(s) IV Push every 8 hours PRN  melatonin 3 milliGRAM(s) Oral at bedtime PRN  ondansetron Injectable 4 milliGRAM(s) IV Push every 8 hours PRN      VITALS:   T(C): 36.2 (06-21-25 @ 11:48), Max: 36.2 (06-21-25 @ 11:48)  HR: 100 (06-21-25 @ 11:48) (79 - 100)  BP: 120/70 (06-21-25 @ 11:48) (119/56 - 146/65)  RR: 20 (06-21-25 @ 11:48) (18 - 20)  SpO2: 95% (06-21-25 @ 11:48) (82% - 98%)  I&O's Summary    20 Jun 2025 07:01  -  21 Jun 2025 07:00  --------------------------------------------------------  IN: 900 mL / OUT: 1370 mL / NET: -470 mL    21 Jun 2025 07:01  -  21 Jun 2025 11:54  --------------------------------------------------------  IN: 0 mL / OUT: 300 mL / NET: -300 mL          PHYSICAL EXAM:  GENERAL: NAD  HEAD:  Atraumatic, Normocephalic  EYES: EOMI, PERRLA, conjunctiva and sclera clear  CHEST/LUNG: decreased breath sounds bilaterally  HEART: Regular rate and rhythm  ABDOMEN: Soft, Nontender, Nondistended; Bowel sounds present  EXTREMITIES:  2+ Peripheral Pulses, No edema  SKIN: No rashes or lesions    LABS:                        13.6   14.41 )-----------( 210      ( 21 Jun 2025 05:00 )             41.8     06-21    136  |  104  |  36[H]  ----------------------------<  129[H]  5.4[H]   |  22  |  2.0[H]    Ca    8.5      21 Jun 2025 05:00  Phos  4.1     06-21  Mg     2.2     06-21    TPro  6.3  /  Alb  4.1  /  TBili  0.3  /  DBili  x   /  AST  21  /  ALT  17  /  AlkPhos  66  06-21    PT/INR - ( 21 Jun 2025 05:00 )   PT: 10.70 sec;   INR: 0.91 ratio         PTT - ( 21 Jun 2025 05:00 )  PTT:30.0 sec  Urinalysis Basic - ( 21 Jun 2025 05:00 )    Color: x / Appearance: x / SG: x / pH: x  Gluc: 129 mg/dL / Ketone: x  / Bili: x / Urobili: x   Blood: x / Protein: x / Nitrite: x   Leuk Esterase: x / RBC: x / WBC x   Sq Epi: x / Non Sq Epi: x / Bacteria: x      ABG - ( 20 Jun 2025 13:22 )  pH, Arterial: 7.36  pH, Blood: x     /  pCO2: 35    /  pO2: 116   / HCO3: 20    / Base Excess: -4.9  /  SaO2: 98.2              Lactate, Blood: 1.3 mmol/L (06-21-25 @ 05:00)  Lactate, Blood: 2.2 mmol/L *H* (06-21-25 @ 01:18)  Lactate, Blood: 2.8 mmol/L *H* (06-20-25 @ 17:40)      Culture - Blood (collected 19 Jun 2025 22:29)  Source: Blood Blood-Peripheral  Preliminary Report (21 Jun 2025 03:02):    No growth at 24 hours    Culture - Blood (collected 19 Jun 2025 07:55)  Source: Blood None  Preliminary Report (20 Jun 2025 17:01):    No growth at 24 hours               24H events:    Today is hospital day 4d. This morning patient was seen and examined at bedside, resting comfortably in bed.    No acute or major events overnight.    PAST MEDICAL & SURGICAL HISTORY  Schizophrenia    No significant past surgical history        SOCIAL HISTORY:  Social History:      ALLERGIES:  No Known Allergies      MEDICATIONS:  STANDING MEDICATIONS  albuterol    90 MICROgram(s) HFA Inhaler 2 Puff(s) Inhalation two times a day  albuterol/ipratropium for Nebulization 3 milliLiter(s) Nebulizer every 6 hours  amLODIPine   Tablet 10 milliGRAM(s) Oral daily  atorvastatin 40 milliGRAM(s) Oral at bedtime  buPROPion XL (24-Hour) . 150 milliGRAM(s) Oral daily  chlorhexidine 2% Cloths 1 Application(s) Topical <User Schedule>  diatrizoate meglumine/diatrizoate sodium. 30 milliLiter(s) Oral once  fluPHENAZine 10 milliGRAM(s) Oral daily  fluticasone propionate/ salmeterol 500-50 MICROgram(s) Diskus 1 Dose(s) Inhalation two times a day  folic acid 1 milliGRAM(s) Oral daily  guaiFENesin Oral Liquid (Sugar-Free) 100 milliGRAM(s) Oral every 6 hours  heparin   Injectable 5000 Unit(s) SubCutaneous every 8 hours  hydrALAZINE Injectable 10 milliGRAM(s) IV Push three times a day  HYDROmorphone  Injectable 0.2 milliGRAM(s) IV Push once  lactulose Retention Enema 200 Gram(s) Rectal once  memantine 5 milliGRAM(s) Oral at bedtime  methylPREDNISolone sodium succinate Injectable 40 milliGRAM(s) IV Push every 12 hours  metoclopramide Injectable 10 milliGRAM(s) IV Push three times a day  nicotine -  14 mG/24Hr(s) Patch 1 Patch Transdermal daily  OLANZapine 20 milliGRAM(s) Oral at bedtime  pantoprazole    Tablet 40 milliGRAM(s) Oral before breakfast  piperacillin/tazobactam IVPB.- 3.375 Gram(s) IV Intermittent once  piperacillin/tazobactam IVPB.. 3.375 Gram(s) IV Intermittent every 8 hours  polyethylene glycol 3350 17 Gram(s) Oral two times a day  senna 2 Tablet(s) Oral at bedtime  thiamine 100 milliGRAM(s) Oral daily  vancomycin  IVPB      vancomycin  IVPB 1250 milliGRAM(s) IV Intermittent once    PRN MEDICATIONS  acetaminophen     Tablet .. 650 milliGRAM(s) Oral every 6 hours PRN  LORazepam   Injectable 0.5 milliGRAM(s) IV Push every 8 hours PRN  melatonin 3 milliGRAM(s) Oral at bedtime PRN  ondansetron Injectable 4 milliGRAM(s) IV Push every 8 hours PRN      VITALS:   T(C): 36.2 (06-21-25 @ 11:48), Max: 36.2 (06-21-25 @ 11:48)  HR: 100 (06-21-25 @ 11:48) (79 - 100)  BP: 120/70 (06-21-25 @ 11:48) (119/56 - 146/65)  RR: 20 (06-21-25 @ 11:48) (18 - 20)  SpO2: 95% (06-21-25 @ 11:48) (82% - 98%)  I&O's Summary    20 Jun 2025 07:01  -  21 Jun 2025 07:00  --------------------------------------------------------  IN: 900 mL / OUT: 1370 mL / NET: -470 mL    21 Jun 2025 07:01  -  21 Jun 2025 11:54  --------------------------------------------------------  IN: 0 mL / OUT: 300 mL / NET: -300 mL          PHYSICAL EXAM:  GENERAL: Alert, follows commands  HEAD:  Atraumatic, Normocephalic  EYES: EOMI, PERRLA, conjunctiva and sclera clear  CHEST/LUNG: decreased breath sounds bilaterally  HEART: Regular rate and rhythm  ABDOMEN: left flank tenderness, epigastric tenderness  EXTREMITIES:  2+ Peripheral Pulses, No edema  SKIN: No rashes or lesions    LABS:                        13.6   14.41 )-----------( 210      ( 21 Jun 2025 05:00 )             41.8     06-21    136  |  104  |  36[H]  ----------------------------<  129[H]  5.4[H]   |  22  |  2.0[H]    Ca    8.5      21 Jun 2025 05:00  Phos  4.1     06-21  Mg     2.2     06-21    TPro  6.3  /  Alb  4.1  /  TBili  0.3  /  DBili  x   /  AST  21  /  ALT  17  /  AlkPhos  66  06-21    PT/INR - ( 21 Jun 2025 05:00 )   PT: 10.70 sec;   INR: 0.91 ratio         PTT - ( 21 Jun 2025 05:00 )  PTT:30.0 sec  Urinalysis Basic - ( 21 Jun 2025 05:00 )    Color: x / Appearance: x / SG: x / pH: x  Gluc: 129 mg/dL / Ketone: x  / Bili: x / Urobili: x   Blood: x / Protein: x / Nitrite: x   Leuk Esterase: x / RBC: x / WBC x   Sq Epi: x / Non Sq Epi: x / Bacteria: x      ABG - ( 20 Jun 2025 13:22 )  pH, Arterial: 7.36  pH, Blood: x     /  pCO2: 35    /  pO2: 116   / HCO3: 20    / Base Excess: -4.9  /  SaO2: 98.2              Lactate, Blood: 1.3 mmol/L (06-21-25 @ 05:00)  Lactate, Blood: 2.2 mmol/L *H* (06-21-25 @ 01:18)  Lactate, Blood: 2.8 mmol/L *H* (06-20-25 @ 17:40)      Culture - Blood (collected 19 Jun 2025 22:29)  Source: Blood Blood-Peripheral  Preliminary Report (21 Jun 2025 03:02):    No growth at 24 hours    Culture - Blood (collected 19 Jun 2025 07:55)  Source: Blood None  Preliminary Report (20 Jun 2025 17:01):    No growth at 24 hours

## 2025-06-21 NOTE — PROGRESS NOTE ADULT - ASSESSMENT
IMPRESSION:    Acute hypoxemic resp failure on HHFNC/ NIV  Possible Aspiration event  COPD exacerbation  Abd pain CT AP on admission noted  Acute on chronic renal failure  Lactic acidosis improved  HO HTN, MDD, schizophrenia, dementia     PLAN:    CNS: Avoid CNS depressant, keep OP meds    HEENT:  Oral care    PULMONARY:  HOB @ 45 degrees, aspiration precaution, HHFNC/ NIV keep SaO2 92 TO 96%, steroids, Neb repeat CXR    CARDIOVASCULAR: Echo noted, BP control, sp lasix, keep equal balance    GI: GI prophylaxis                                          Feeding as tolerated    RENAL:  F/u  lytes.  Correct as needed. accurate I/O, CT noted    INFECTIOUS DISEASE: abx per ID, procal    HEMATOLOGICAL:  DVT prophylaxis. LE doppler    ENDOCRINE:  Follow up FS.  Insulin protocol if needed.    SDU     IMPRESSION:    Acute hypoxemic resp failure on HHFNC/ NIV  Aspiration PNA  COPD exacerbation  Abd pain CT AP on admission noted  Acute on chronic renal failure  Lactic acidosis improved  HO HTN, MDD, schizophrenia, dementia     PLAN:    CNS: Avoid CNS depressant.  CW OP meds    HEENT:  Oral care    PULMONARY:  HOB @ 45 degrees.  Aspiration precaution.  4 hrs on 4 hrs off NIV.  Alternate when HHFNC.  POX target 92-96%.  Solu-medrol 40mg q12.  Nebs q6.  Repeat CXR noted    CARDIOVASCULAR: TTE noted nl EF.  BP control.  DC IVF.  Equal balance.    GI: GI prophylaxis.  Prokinetic: Reglan standing.  Feeding as tolerated per speech.  CT abdomen on hold.    RENAL:  F/u  lytes.  Correct as needed.  Strict Is and Os.      INFECTIOUS DISEASE: Zosyn and Vanco.  FU Cx.  MRSA nares.    HEMATOLOGICAL:  DVT prophylaxis.  LE doppler.    ENDOCRINE:  Follow up FS.  Insulin protocol if needed.    SDU

## 2025-06-22 LAB
ALBUMIN SERPL ELPH-MCNC: 4.2 G/DL — SIGNIFICANT CHANGE UP (ref 3.5–5.2)
ALP SERPL-CCNC: 60 U/L — SIGNIFICANT CHANGE UP (ref 30–115)
ALT FLD-CCNC: 16 U/L — SIGNIFICANT CHANGE UP (ref 0–41)
ANION GAP SERPL CALC-SCNC: 11 MMOL/L — SIGNIFICANT CHANGE UP (ref 7–14)
AST SERPL-CCNC: 29 U/L — SIGNIFICANT CHANGE UP (ref 0–41)
BASOPHILS # BLD AUTO: 0.03 K/UL — SIGNIFICANT CHANGE UP (ref 0–0.2)
BASOPHILS NFR BLD AUTO: 0.2 % — SIGNIFICANT CHANGE UP (ref 0–1)
BILIRUB SERPL-MCNC: 0.6 MG/DL — SIGNIFICANT CHANGE UP (ref 0.2–1.2)
BUN SERPL-MCNC: 43 MG/DL — HIGH (ref 10–20)
CALCIUM SERPL-MCNC: 8.8 MG/DL — SIGNIFICANT CHANGE UP (ref 8.4–10.5)
CHLORIDE SERPL-SCNC: 105 MMOL/L — SIGNIFICANT CHANGE UP (ref 98–110)
CO2 SERPL-SCNC: 23 MMOL/L — SIGNIFICANT CHANGE UP (ref 17–32)
CREAT SERPL-MCNC: 2.3 MG/DL — HIGH (ref 0.7–1.5)
EGFR: 29 ML/MIN/1.73M2 — LOW
EGFR: 29 ML/MIN/1.73M2 — LOW
EOSINOPHIL # BLD AUTO: 0 K/UL — SIGNIFICANT CHANGE UP (ref 0–0.7)
EOSINOPHIL NFR BLD AUTO: 0 % — SIGNIFICANT CHANGE UP (ref 0–8)
GLUCOSE SERPL-MCNC: 111 MG/DL — HIGH (ref 70–99)
HCT VFR BLD CALC: 41.6 % — LOW (ref 42–52)
HGB BLD-MCNC: 13.7 G/DL — LOW (ref 14–18)
IMM GRANULOCYTES NFR BLD AUTO: 1.2 % — HIGH (ref 0.1–0.3)
LYMPHOCYTES # BLD AUTO: 1.1 K/UL — LOW (ref 1.2–3.4)
LYMPHOCYTES # BLD AUTO: 7.5 % — LOW (ref 20.5–51.1)
MAGNESIUM SERPL-MCNC: 2.6 MG/DL — HIGH (ref 1.8–2.4)
MCHC RBC-ENTMCNC: 30 PG — SIGNIFICANT CHANGE UP (ref 27–31)
MCHC RBC-ENTMCNC: 32.9 G/DL — SIGNIFICANT CHANGE UP (ref 32–37)
MCV RBC AUTO: 91.2 FL — SIGNIFICANT CHANGE UP (ref 80–94)
MONOCYTES # BLD AUTO: 1.49 K/UL — HIGH (ref 0.1–0.6)
MONOCYTES NFR BLD AUTO: 10.2 % — HIGH (ref 1.7–9.3)
NEUTROPHILS # BLD AUTO: 11.78 K/UL — HIGH (ref 1.4–6.5)
NEUTROPHILS NFR BLD AUTO: 80.9 % — HIGH (ref 42.2–75.2)
NRBC BLD AUTO-RTO: 0 /100 WBCS — SIGNIFICANT CHANGE UP (ref 0–0)
PHOSPHATE SERPL-MCNC: 5 MG/DL — HIGH (ref 2.1–4.9)
PLATELET # BLD AUTO: 213 K/UL — SIGNIFICANT CHANGE UP (ref 130–400)
PMV BLD: 10.8 FL — HIGH (ref 7.4–10.4)
POTASSIUM SERPL-MCNC: 5 MMOL/L — SIGNIFICANT CHANGE UP (ref 3.5–5)
POTASSIUM SERPL-SCNC: 5 MMOL/L — SIGNIFICANT CHANGE UP (ref 3.5–5)
PROT SERPL-MCNC: 6.5 G/DL — SIGNIFICANT CHANGE UP (ref 6–8)
RBC # BLD: 4.56 M/UL — LOW (ref 4.7–6.1)
RBC # FLD: 14 % — SIGNIFICANT CHANGE UP (ref 11.5–14.5)
SODIUM SERPL-SCNC: 139 MMOL/L — SIGNIFICANT CHANGE UP (ref 135–146)
WBC # BLD: 14.57 K/UL — HIGH (ref 4.8–10.8)
WBC # FLD AUTO: 14.57 K/UL — HIGH (ref 4.8–10.8)

## 2025-06-22 PROCEDURE — 99231 SBSQ HOSP IP/OBS SF/LOW 25: CPT

## 2025-06-22 PROCEDURE — 99233 SBSQ HOSP IP/OBS HIGH 50: CPT

## 2025-06-22 PROCEDURE — 99291 CRITICAL CARE FIRST HOUR: CPT

## 2025-06-22 PROCEDURE — 71045 X-RAY EXAM CHEST 1 VIEW: CPT | Mod: 26

## 2025-06-22 RX ORDER — MAGNESIUM HYDROXIDE 400 MG/5ML
30 SUSPENSION ORAL DAILY
Refills: 0 | Status: DISCONTINUED | OUTPATIENT
Start: 2025-06-22 | End: 2025-07-01

## 2025-06-22 RX ORDER — IPRATROPIUM BROMIDE AND ALBUTEROL SULFATE .5; 2.5 MG/3ML; MG/3ML
3 SOLUTION RESPIRATORY (INHALATION) ONCE
Refills: 0 | Status: COMPLETED | OUTPATIENT
Start: 2025-06-22 | End: 2025-06-22

## 2025-06-22 RX ORDER — LACTULOSE 10 G/15ML
20 SOLUTION ORAL DAILY
Refills: 0 | Status: DISCONTINUED | OUTPATIENT
Start: 2025-06-22 | End: 2025-07-01

## 2025-06-22 RX ADMIN — METHYLPREDNISOLONE ACETATE 40 MILLIGRAM(S): 80 INJECTION, SUSPENSION INTRA-ARTICULAR; INTRALESIONAL; INTRAMUSCULAR; SOFT TISSUE at 05:51

## 2025-06-22 RX ADMIN — LACTULOSE 20 GRAM(S): 10 SOLUTION ORAL at 11:43

## 2025-06-22 RX ADMIN — NICOTINE POLACRILEX 1 PATCH: 4 GUM, CHEWING ORAL at 11:43

## 2025-06-22 RX ADMIN — HEPARIN SODIUM 5000 UNIT(S): 1000 INJECTION INTRAVENOUS; SUBCUTANEOUS at 22:22

## 2025-06-22 RX ADMIN — Medication 10 MILLIGRAM(S): at 05:52

## 2025-06-22 RX ADMIN — NICOTINE POLACRILEX 1 PATCH: 4 GUM, CHEWING ORAL at 19:20

## 2025-06-22 RX ADMIN — NICOTINE POLACRILEX 1 PATCH: 4 GUM, CHEWING ORAL at 08:30

## 2025-06-22 RX ADMIN — Medication 2 TABLET(S): at 22:23

## 2025-06-22 RX ADMIN — HEPARIN SODIUM 5000 UNIT(S): 1000 INJECTION INTRAVENOUS; SUBCUTANEOUS at 13:08

## 2025-06-22 RX ADMIN — Medication 10 MILLIGRAM(S): at 22:23

## 2025-06-22 RX ADMIN — HEPARIN SODIUM 5000 UNIT(S): 1000 INJECTION INTRAVENOUS; SUBCUTANEOUS at 05:52

## 2025-06-22 RX ADMIN — METHYLPREDNISOLONE ACETATE 40 MILLIGRAM(S): 80 INJECTION, SUSPENSION INTRA-ARTICULAR; INTRALESIONAL; INTRAMUSCULAR; SOFT TISSUE at 17:13

## 2025-06-22 RX ADMIN — Medication 100 MILLIGRAM(S): at 12:35

## 2025-06-22 RX ADMIN — Medication 1 DOSE(S): at 09:44

## 2025-06-22 RX ADMIN — MEMANTINE HYDROCHLORIDE 5 MILLIGRAM(S): 21 CAPSULE, EXTENDED RELEASE ORAL at 22:22

## 2025-06-22 RX ADMIN — Medication 25 GRAM(S): at 22:23

## 2025-06-22 RX ADMIN — FOLIC ACID 1 MILLIGRAM(S): 1 TABLET ORAL at 12:37

## 2025-06-22 RX ADMIN — IPRATROPIUM BROMIDE AND ALBUTEROL SULFATE 3 MILLILITER(S): .5; 2.5 SOLUTION RESPIRATORY (INHALATION) at 15:53

## 2025-06-22 RX ADMIN — Medication 10 MILLIGRAM(S): at 11:45

## 2025-06-22 RX ADMIN — Medication 10 MILLIGRAM(S): at 13:07

## 2025-06-22 RX ADMIN — ATORVASTATIN CALCIUM 40 MILLIGRAM(S): 80 TABLET, FILM COATED ORAL at 22:22

## 2025-06-22 RX ADMIN — Medication 10 MILLIGRAM(S): at 05:51

## 2025-06-22 RX ADMIN — Medication 25 GRAM(S): at 13:07

## 2025-06-22 RX ADMIN — IPRATROPIUM BROMIDE AND ALBUTEROL SULFATE 3 MILLILITER(S): .5; 2.5 SOLUTION RESPIRATORY (INHALATION) at 19:52

## 2025-06-22 RX ADMIN — OLANZAPINE 20 MILLIGRAM(S): 10 TABLET ORAL at 22:24

## 2025-06-22 RX ADMIN — POLYETHYLENE GLYCOL 3350 17 GRAM(S): 17 POWDER, FOR SOLUTION ORAL at 17:12

## 2025-06-22 RX ADMIN — IPRATROPIUM BROMIDE AND ALBUTEROL SULFATE 3 MILLILITER(S): .5; 2.5 SOLUTION RESPIRATORY (INHALATION) at 01:14

## 2025-06-22 RX ADMIN — Medication 166.67 MILLIGRAM(S): at 09:17

## 2025-06-22 RX ADMIN — BUPROPION HYDROBROMIDE 150 MILLIGRAM(S): 522 TABLET, EXTENDED RELEASE ORAL at 11:44

## 2025-06-22 RX ADMIN — Medication 1 APPLICATION(S): at 05:52

## 2025-06-22 RX ADMIN — Medication 1 DOSE(S): at 22:45

## 2025-06-22 RX ADMIN — Medication 40 MILLIGRAM(S): at 08:18

## 2025-06-22 RX ADMIN — IPRATROPIUM BROMIDE AND ALBUTEROL SULFATE 3 MILLILITER(S): .5; 2.5 SOLUTION RESPIRATORY (INHALATION) at 07:35

## 2025-06-22 RX ADMIN — Medication 25 GRAM(S): at 05:51

## 2025-06-22 RX ADMIN — IPRATROPIUM BROMIDE AND ALBUTEROL SULFATE 3 MILLILITER(S): .5; 2.5 SOLUTION RESPIRATORY (INHALATION) at 13:17

## 2025-06-22 NOTE — PROGRESS NOTE ADULT - ASSESSMENT
Patient is a 79-year-old male with past medical history of HTN, MDD, schizophrenia, dementia presents to the ED for left lower quadrant abdominal pain associated shortness of breath and cough admitted for URI, blood cultures positive with GPC in pairs. Patient is a poor historian on exam, and only mentions LLQ pain, states no associating symptoms. KUB done 6/20 reveals large colonic fecal burden within the ascending colon and nonspecific bowel gas pattern. Physical exam findings, imaging, and labs as documented above.     PLAN:  - Pending CT A/P   - Rest per primary team     [phone]   Patient is a 79-year-old male with past medical history of HTN, MDD, schizophrenia, dementia presents to the ED for left lower quadrant abdominal pain associated shortness of breath and cough admitted for URI, blood cultures positive with GPC in pairs. Patient is a poor historian on exam, and only mentions LLQ pain, states no associating symptoms. KUB done 6/20 reveals large colonic fecal burden within the ascending colon and nonspecific bowel gas pattern. Physical exam findings, imaging, and labs as documented above.     PLAN:  - Patient without abdominal pain, KUB with normal gas pattern   - Surgery signing off   - If patient has abdominal pain, recommend CTAP and recall surgery  - Rest of care per primary team     [phone]

## 2025-06-22 NOTE — DIETITIAN INITIAL EVALUATION ADULT - ORAL INTAKE PTA/DIET HISTORY
Hx obtained from pt at bedside. Reports good PO intake prior to admission. No food allergies or intolerances. Does not eat pork; otherwise, no Yarsani/cultural food practices. Does not follow any diet at home. Denies chewing/swallowing issues. UBW 200lbs. Denies recent weight changes.

## 2025-06-22 NOTE — PROGRESS NOTE ADULT - ATTENDING COMMENTS
IMPRESSION:    Acute hypoxemic resp failure on HHFNC/ NIV  Aspiration PNA  COPD exacerbation  Abd pain  Acute on chronic renal failure  Lactic acidosis improved  HO HTN, MDD, schizophrenia, dementia     Plan as outlined above

## 2025-06-22 NOTE — DIETITIAN INITIAL EVALUATION ADULT - PERTINENT LABORATORY DATA
06-22    139  |  105  |  43[H]  ----------------------------<  111[H]  5.0   |  23  |  2.3[H]    Ca    8.8      22 Jun 2025 06:09  Phos  5.0     06-22  Mg     2.6     06-22    TPro  6.5  /  Alb  4.2  /  TBili  0.6  /  DBili  x   /  AST  29  /  ALT  16  /  AlkPhos  60  06-22  A1C with Estimated Average Glucose Result: 6.0 % (06-21-25 @ 05:00)  A1C with Estimated Average Glucose Result: 6.1 % (06-18-25 @ 08:16)

## 2025-06-22 NOTE — DIETITIAN INITIAL EVALUATION ADULT - OTHER INFO
pt being managed for left lower quadrant abdominal pain associated shortness of breath and cough admitted for URI, blood cultures positive with GPC in pairs.

## 2025-06-22 NOTE — PROGRESS NOTE ADULT - ATTENDING COMMENTS
Trauma/Acute Care Surgery Attending Note Attestation  Patient was seen and examined bedside.  I reviewed the resident/PA note and agreed with above assessment and plan with following additions and corrections.    T(C): 36.6 (06-22-25 @ 16:20), Max: 36.6 (06-22-25 @ 16:20)  HR: 98 (06-22-25 @ 16:20) (84 - 99)  BP: 137/65 (06-22-25 @ 16:20) (124/63 - 173/64)  RR: 20 (06-22-25 @ 16:20) (18 - 20)  SpO2: 97% (06-22-25 @ 16:20) (94% - 99%)      06-21-25 @ 07:01  -  06-22-25 @ 07:00  --------------------------------------------------------  IN:  Total IN: 0 mL    OUT:    Voided (mL): 1675 mL  Total OUT: 1675 mL    Total NET: -1675 mL      06-22-25 @ 07:01  -  06-22-25 @ 18:58  --------------------------------------------------------  IN:    IV PiggyBack: 350 mL    Oral Fluid: 400 mL  Total IN: 750 mL    OUT:    Voided (mL): 925 mL  Total OUT: 925 mL    Total NET: -175 mL          I independently performed a medically appropriate exam. The exam was notable for                          13.7   14.57 )-----------( 213      ( 22 Jun 2025 06:09 )             41.6     06-22    139  |  105  |  43[H]  ----------------------------<  111[H]  5.0   |  23  |  2.3[H]    Ca 8.8; Mg 2.6[H]; Phos 5.0[H]      ( 22 Jun 2025 06:09 )  Alb: 4.2 g/dL / Pro: 6.5 g/dL / AlkPhos: 60 U/L / ALT: 16 U/L / AST: 29 U/L / GGT:x     / Tbili 0.6 mg/dL/ Dbili x     / Indbili x        PT/INR/PTT - ( 21 Jun 2025 05:00 )   PT: 10.70 sec; INR: 0.91 ratio; PTT 30.0 sec         Lactate, Blood: 1.3 mmol/L (06-21 @ 05:00)  Lactate, Blood: 2.2 mmol/L (06-21 @ 01:18)  Lactate, Blood: 2.8 mmol/L (06-20 @ 17:40)  Blood Gas Arterial, Lactate: 4.0 mmol/L (06-20 @ 13:22)  Lactate, Blood: 3.0 mmol/L (06-20 @ 11:25)        Assessment/Plan:  79y Male     Gabe Antoine MD  Trauma/Acute Care Surgery/Surgical Critical Care Attending Trauma/Acute Care Surgery Attending Note Attestation  Patient was seen and examined bedside on 6/22.  I reviewed the resident/PA note and agreed with above assessment and plan with following additions and corrections.    Reports no abdominal pain. Passing flatus    T(C): 36.6 (06-22-25 @ 16:20), Max: 36.6 (06-22-25 @ 16:20)  HR: 98 (06-22-25 @ 16:20) (84 - 99)  BP: 137/65 (06-22-25 @ 16:20) (124/63 - 173/64)  RR: 20 (06-22-25 @ 16:20) (18 - 20)  SpO2: 97% (06-22-25 @ 16:20) (94% - 99%)      06-21-25 @ 07:01  -  06-22-25 @ 07:00  --------------------------------------------------------  IN:  Total IN: 0 mL    OUT:    Voided (mL): 1675 mL  Total OUT: 1675 mL    Total NET: -1675 mL      06-22-25 @ 07:01  -  06-22-25 @ 18:58  --------------------------------------------------------  IN:    IV PiggyBack: 350 mL    Oral Fluid: 400 mL  Total IN: 750 mL    OUT:    Voided (mL): 925 mL  Total OUT: 925 mL    Total NET: -175 mL    I independently performed a medically appropriate exam. The exam was notable for mild abdominal distention but no abdominal tenderness                        13.7   14.57 )-----------( 213      ( 22 Jun 2025 06:09 )             41.6     06-22    139  |  105  |  43[H]  ----------------------------<  111[H]  5.0   |  23  |  2.3[H]    Ca 8.8; Mg 2.6[H]; Phos 5.0[H]      ( 22 Jun 2025 06:09 )  Alb: 4.2 g/dL / Pro: 6.5 g/dL / AlkPhos: 60 U/L / ALT: 16 U/L / AST: 29 U/L / GGT:x     / Tbili 0.6 mg/dL/ Dbili x     / Indbili x        PT/INR/PTT - ( 21 Jun 2025 05:00 )   PT: 10.70 sec; INR: 0.91 ratio; PTT 30.0 sec       Lactate, Blood: 1.3 mmol/L (06-21 @ 05:00)  Lactate, Blood: 2.2 mmol/L (06-21 @ 01:18)      Assessment/Plan:  Lactic acidosis improving. Abdominal pain resolved. No need for CT A/P at present. Continue treatment with zosyn for aspiration pneumonia. Please recall if patient has recurrent severe abdominal pain.    Gabe Antoine MD  Trauma/Acute Care Surgery/Surgical Critical Care Attending

## 2025-06-22 NOTE — PROGRESS NOTE ADULT - SUBJECTIVE AND OBJECTIVE BOX
Patient is a 79y old  Male who presents with a chief complaint of SOB (22 Jun 2025 01:29)        Over Night Events:  Remains on bipap, not on pressors.          PHYSICAL EXAM    ICU Vital Signs Last 24 Hrs  T(C): 36.3 (22 Jun 2025 08:13), Max: 36.3 (22 Jun 2025 08:13)  T(F): 97.4 (22 Jun 2025 08:13), Max: 97.4 (22 Jun 2025 08:13)  HR: 93 (22 Jun 2025 08:13) (84 - 100)  BP: 149/75 (22 Jun 2025 08:13) (120/70 - 173/64)  BP(mean): 94 (22 Jun 2025 08:13) (86 - 97)  ABP: --  ABP(mean): --  RR: 20 (22 Jun 2025 08:13) (18 - 20)  SpO2: 96% (22 Jun 2025 08:13) (90% - 99%)    O2 Parameters below as of 22 Jun 2025 08:13  Patient On (Oxygen Delivery Method): BiPAP/CPAP            CONSTITUTIONAL:  ill appearing    ENT:   Airway patent,   Mouth with normal mucosa.     EYES:   Pupils equal,   Round and reactive to light.    CARDIAC:   Normal rate,   Regular rhythm.    No edema      RESPIRATORY:   No wheezing  Bilateral BS  Normal chest expansion      GASTROINTESTINAL:  Abdomen soft,   Non-tender,   No guarding,   + BS    MUSCULOSKELETAL:   No clubbing, cyanosis    NEUROLOGICAL:   Alert     SKIN:   Skin normal color for race,   Warm and dry and intact.   No evidence of rash.    06-21-25 @ 07:01  -  06-22-25 @ 07:00  --------------------------------------------------------  IN:  Total IN: 0 mL    OUT:    Voided (mL): 1675 mL  Total OUT: 1675 mL    Total NET: -1675 mL      06-22-25 @ 07:01  -  06-22-25 @ 10:01  --------------------------------------------------------  IN:    IV PiggyBack: 250 mL  Total IN: 250 mL    OUT:  Total OUT: 0 mL    Total NET: 250 mL          LABS:                            13.7   14.57 )-----------( 213      ( 22 Jun 2025 06:09 )             41.6                                               06-22    139  |  105  |  43[H]  ----------------------------<  111[H]  5.0   |  23  |  2.3[H]    Ca    8.8      22 Jun 2025 06:09  Phos  5.0     06-22  Mg     2.6     06-22    TPro  6.5  /  Alb  4.2  /  TBili  0.6  /  DBili  x   /  AST  29  /  ALT  16  /  AlkPhos  60  06-22      PT/INR - ( 21 Jun 2025 05:00 )   PT: 10.70 sec;   INR: 0.91 ratio         PTT - ( 21 Jun 2025 05:00 )  PTT:30.0 sec                                       Urinalysis Basic - ( 22 Jun 2025 06:09 )    Color: x / Appearance: x / SG: x / pH: x  Gluc: 111 mg/dL / Ketone: x  / Bili: x / Urobili: x   Blood: x / Protein: x / Nitrite: x   Leuk Esterase: x / RBC: x / WBC x   Sq Epi: x / Non Sq Epi: x / Bacteria: x                                                  LIVER FUNCTIONS - ( 22 Jun 2025 06:09 )  Alb: 4.2 g/dL / Pro: 6.5 g/dL / ALK PHOS: 60 U/L / ALT: 16 U/L / AST: 29 U/L / GGT: x                                                  Culture - Blood (collected 21 Jun 2025 05:00)  Source: Blood Blood-Venous  Preliminary Report (22 Jun 2025 10:01):    No growth at 24 hours    Culture - Blood (collected 20 Jun 2025 05:31)  Source: Blood None  Preliminary Report (21 Jun 2025 14:01):    No growth at 24 hours    Culture - Blood (collected 19 Jun 2025 22:29)  Source: Blood Blood-Peripheral  Preliminary Report (22 Jun 2025 03:02):    No growth at 48 Hours                                                                                       ABG - ( 20 Jun 2025 13:22 )  pH, Arterial: 7.36  pH, Blood: x     /  pCO2: 35    /  pO2: 116   / HCO3: 20    / Base Excess: -4.9  /  SaO2: 98.2                MEDICATIONS  (STANDING):  albuterol    90 MICROgram(s) HFA Inhaler 2 Puff(s) Inhalation two times a day  albuterol/ipratropium for Nebulization 3 milliLiter(s) Nebulizer every 6 hours  amLODIPine   Tablet 10 milliGRAM(s) Oral daily  atorvastatin 40 milliGRAM(s) Oral at bedtime  buPROPion XL (24-Hour) . 150 milliGRAM(s) Oral daily  chlorhexidine 2% Cloths 1 Application(s) Topical <User Schedule>  diatrizoate meglumine/diatrizoate sodium. 30 milliLiter(s) Oral once  fluPHENAZine 10 milliGRAM(s) Oral daily  fluticasone propionate/ salmeterol 500-50 MICROgram(s) Diskus 1 Dose(s) Inhalation two times a day  folic acid Injectable 1 milliGRAM(s) IV Push daily  heparin   Injectable 5000 Unit(s) SubCutaneous every 8 hours  hydrALAZINE Injectable 10 milliGRAM(s) IV Push three times a day  lactulose Retention Enema 200 Gram(s) Rectal once  memantine 5 milliGRAM(s) Oral at bedtime  methylPREDNISolone sodium succinate Injectable 40 milliGRAM(s) IV Push every 12 hours  metoclopramide Injectable 10 milliGRAM(s) IV Push three times a day  nicotine -  14 mG/24Hr(s) Patch 1 Patch Transdermal daily  OLANZapine 20 milliGRAM(s) Oral at bedtime  pantoprazole  Injectable 40 milliGRAM(s) IV Push with breakfast  piperacillin/tazobactam IVPB.. 3.375 Gram(s) IV Intermittent every 8 hours  polyethylene glycol 3350 17 Gram(s) Oral two times a day  senna 2 Tablet(s) Oral at bedtime  thiamine Injectable 100 milliGRAM(s) IV Push daily  vancomycin  IVPB      vancomycin  IVPB 1250 milliGRAM(s) IV Intermittent every 24 hours    MEDICATIONS  (PRN):  acetaminophen     Tablet .. 650 milliGRAM(s) Oral every 6 hours PRN Mild Pain (1 - 3), Moderate Pain (4 - 6)  LORazepam   Injectable 0.5 milliGRAM(s) IV Push every 8 hours PRN Agitation/anxiety  melatonin 3 milliGRAM(s) Oral at bedtime PRN Insomnia  ondansetron Injectable 4 milliGRAM(s) IV Push every 8 hours PRN Nausea and/or Vomiting      New X-rays noted

## 2025-06-22 NOTE — DIETITIAN INITIAL EVALUATION ADULT - ENERGY INTAKE
Reports good PO intake prior to being made NPO due to bipap.   RN states pt being trialed for HFNC - tolerance and subsequent diet resumption pending evaluation.

## 2025-06-22 NOTE — DIETITIAN INITIAL EVALUATION ADULT - PERTINENT MEDS FT
MEDICATIONS  (STANDING):  albuterol    90 MICROgram(s) HFA Inhaler 2 Puff(s) Inhalation two times a day  albuterol/ipratropium for Nebulization 3 milliLiter(s) Nebulizer every 6 hours  albuterol/ipratropium for Nebulization 3 milliLiter(s) Nebulizer once  amLODIPine   Tablet 10 milliGRAM(s) Oral daily  atorvastatin 40 milliGRAM(s) Oral at bedtime  buPROPion XL (24-Hour) . 150 milliGRAM(s) Oral daily  chlorhexidine 2% Cloths 1 Application(s) Topical <User Schedule>  diatrizoate meglumine/diatrizoate sodium. 30 milliLiter(s) Oral once  fluPHENAZine 10 milliGRAM(s) Oral daily  fluticasone propionate/ salmeterol 500-50 MICROgram(s) Diskus 1 Dose(s) Inhalation two times a day  folic acid Injectable 1 milliGRAM(s) IV Push daily  heparin   Injectable 5000 Unit(s) SubCutaneous every 8 hours  hydrALAZINE Injectable 10 milliGRAM(s) IV Push three times a day  lactulose Syrup 20 Gram(s) Oral daily  memantine 5 milliGRAM(s) Oral at bedtime  methylPREDNISolone sodium succinate Injectable 40 milliGRAM(s) IV Push every 12 hours  metoclopramide Injectable 10 milliGRAM(s) IV Push three times a day  nicotine -  14 mG/24Hr(s) Patch 1 Patch Transdermal daily  OLANZapine 20 milliGRAM(s) Oral at bedtime  pantoprazole  Injectable 40 milliGRAM(s) IV Push with breakfast  piperacillin/tazobactam IVPB.. 3.375 Gram(s) IV Intermittent every 8 hours  polyethylene glycol 3350 17 Gram(s) Oral two times a day  senna 2 Tablet(s) Oral at bedtime  thiamine Injectable 100 milliGRAM(s) IV Push daily    MEDICATIONS  (PRN):  acetaminophen     Tablet .. 650 milliGRAM(s) Oral every 6 hours PRN Mild Pain (1 - 3), Moderate Pain (4 - 6)  LORazepam   Injectable 0.5 milliGRAM(s) IV Push every 8 hours PRN Agitation/anxiety  magnesium hydroxide Suspension 30 milliLiter(s) Oral daily PRN Constipation  melatonin 3 milliGRAM(s) Oral at bedtime PRN Insomnia  ondansetron Injectable 4 milliGRAM(s) IV Push every 8 hours PRN Nausea and/or Vomiting

## 2025-06-22 NOTE — PROGRESS NOTE ADULT - ASSESSMENT
IMPRESSION:    Acute hypoxemic resp failure on HHFNC/ NIV  Aspiration PNA  COPD exacerbation  Abd pain CT AP on admission noted  Acute on chronic renal failure  Lactic acidosis improved  HO HTN, MDD, schizophrenia, dementia     PLAN:    CNS: Avoid CNS depressant.  CW OP meds    HEENT:  Oral care    PULMONARY:  HOB @ 45 degrees.  Aspiration precaution.  4 hrs on 4 hrs off NIV.  Alternate when HHFNC.  POX target 92-96%.  Solu-medrol 40mg q12.  Nebs q6.  Repeat CXR noted    CARDIOVASCULAR: TTE noted nl EF.  BP control.  Equal balance. Lactate improved    GI: GI prophylaxis.  Prokinetic: Reglan standing. Enemas, Feeding as tolerated per speech. Surgery eval noted.    RENAL:  F/u  lytes.  Correct as needed.  Strict Is and Os.      INFECTIOUS DISEASE: Zosyn.  FU Cx.  MRSA nares negative.    HEMATOLOGICAL:  DVT prophylaxis.  LE doppler.    ENDOCRINE:  Follow up FS.  Insulin protocol if needed.    SDU IMPRESSION:    Acute hypoxemic resp failure on HHFNC/ NIV  Aspiration PNA  COPD exacerbation  Abd pain  Acute on chronic renal failure  Lactic acidosis improved  HO HTN, MDD, schizophrenia, dementia     PLAN:    CNS: Avoid CNS depressant.  CW OP meds    HEENT:  Oral care    PULMONARY:  HOB @ 45 degrees.  Aspiration precaution.  4 hrs on 4 hrs off NIV.  Alternate when HHFNC.  POX target 92-96%.  Solu-medrol 40mg q12.  Nebs q6.  Repeat CXR noted.  Might need MV     CARDIOVASCULAR: TTE noted nl EF.  BP control.  Equal balance. Lactate improved    GI: GI prophylaxis.  Prokinetic: Reglan standing. Enemas, Feeding as tolerated per speech. Surgery eval noted.    RENAL:  F/u  lytes.  Correct as needed.  Strict Is and Os.      INFECTIOUS DISEASE: Zosyn.  FU Cx.  MRSA nares negative.    HEMATOLOGICAL:  DVT prophylaxis.  LE doppler.    ENDOCRINE:  Follow up FS.  Insulin protocol if needed.    SDU

## 2025-06-22 NOTE — PROGRESS NOTE ADULT - SUBJECTIVE AND OBJECTIVE BOX
ANILA HORTON 79y Male  MRN#: [id number]     SUBJECTIVE  Patient is a 79y old Male who presents with a chief complaint of SOB (22 Jun 2025 01:29)    Interval/Overnight Events:    Today is hospital day 5d, and this morning he is lying in bed without distress.   No acute overnight events.     OBJECTIVE  PAST MEDICAL & SURGICAL HISTORY  Schizophrenia    No significant past surgical history      ALLERGIES:  No Known Allergies    MEDICATIONS:  STANDING MEDICATIONS  albuterol    90 MICROgram(s) HFA Inhaler 2 Puff(s) Inhalation two times a day  albuterol/ipratropium for Nebulization 3 milliLiter(s) Nebulizer every 6 hours  amLODIPine   Tablet 10 milliGRAM(s) Oral daily  atorvastatin 40 milliGRAM(s) Oral at bedtime  buPROPion XL (24-Hour) . 150 milliGRAM(s) Oral daily  chlorhexidine 2% Cloths 1 Application(s) Topical <User Schedule>  diatrizoate meglumine/diatrizoate sodium. 30 milliLiter(s) Oral once  fluPHENAZine 10 milliGRAM(s) Oral daily  fluticasone propionate/ salmeterol 500-50 MICROgram(s) Diskus 1 Dose(s) Inhalation two times a day  folic acid Injectable 1 milliGRAM(s) IV Push daily  heparin   Injectable 5000 Unit(s) SubCutaneous every 8 hours  hydrALAZINE Injectable 10 milliGRAM(s) IV Push three times a day  lactulose Syrup 20 Gram(s) Oral daily  memantine 5 milliGRAM(s) Oral at bedtime  methylPREDNISolone sodium succinate Injectable 40 milliGRAM(s) IV Push every 12 hours  metoclopramide Injectable 10 milliGRAM(s) IV Push three times a day  nicotine -  14 mG/24Hr(s) Patch 1 Patch Transdermal daily  OLANZapine 20 milliGRAM(s) Oral at bedtime  pantoprazole  Injectable 40 milliGRAM(s) IV Push with breakfast  piperacillin/tazobactam IVPB.. 3.375 Gram(s) IV Intermittent every 8 hours  polyethylene glycol 3350 17 Gram(s) Oral two times a day  senna 2 Tablet(s) Oral at bedtime  thiamine Injectable 100 milliGRAM(s) IV Push daily  vancomycin  IVPB      vancomycin  IVPB 1250 milliGRAM(s) IV Intermittent every 24 hours    PRN MEDICATIONS  acetaminophen     Tablet .. 650 milliGRAM(s) Oral every 6 hours PRN  LORazepam   Injectable 0.5 milliGRAM(s) IV Push every 8 hours PRN  magnesium hydroxide Suspension 30 milliLiter(s) Oral daily PRN  melatonin 3 milliGRAM(s) Oral at bedtime PRN  ondansetron Injectable 4 milliGRAM(s) IV Push every 8 hours PRN    HOME MEDICATIONS  Home Medications:  Advair Diskus 250 mcg-50 mcg/inh inhalation powder: 1 puff(s) inhaled 2 times a day (17 Jun 2025 19:02)  Benicar 40 mg oral tablet: 1 tab(s) orally once a day (17 Jun 2025 19:02)  buPROPion 150 mg/24 hours (XL) oral tablet, extended release: 1 tab(s) orally once a day (17 Jun 2025 19:02)  fluPHENAZine 10 mg oral tablet: 1 tab(s) orally once a day (17 Jun 2025 19:02)  folic acid 0.4 mg oral tablet: 1 tab(s) orally once a day (17 Jun 2025 19:02)  lisinopril 20 mg oral tablet: 1 tab(s) orally once a day (17 Jun 2025 19:02)  loratadine 10 mg oral tablet: 1 tab(s) orally once a day (at bedtime) (17 Jun 2025 19:02)  Namenda 5 mg oral tablet: 1 tab(s) orally once a day (at bedtime) (17 Jun 2025 19:02)  Norvasc 5 mg oral tablet: 1 tab(s) orally once a day (17 Jun 2025 19:02)  OLANZapine 20 mg oral tablet: 1 tab(s) orally once a day (at bedtime) (17 Jun 2025 19:02)  omeprazole 40 mg oral delayed release capsule: 1 cap(s) orally once a day (17 Jun 2025 19:02)  thiamine 100 mg oral tablet: 1 tab(s) orally once a day (17 Jun 2025 19:02)      LABS:                        13.7   14.57 )-----------( 213      ( 22 Jun 2025 06:09 )             41.6     06-22    139  |  105  |  43[H]  ----------------------------<  111[H]  5.0   |  23  |  2.3[H]    Ca    8.8      22 Jun 2025 06:09  Phos  5.0     06-22  Mg     2.6     06-22    TPro  6.5  /  Alb  4.2  /  TBili  0.6  /  DBili  x   /  AST  29  /  ALT  16  /  AlkPhos  60  06-22    LIVER FUNCTIONS - ( 22 Jun 2025 06:09 )  Alb: 4.2 g/dL / Pro: 6.5 g/dL / ALK PHOS: 60 U/L / ALT: 16 U/L / AST: 29 U/L / GGT: x           PT/INR - ( 21 Jun 2025 05:00 )   PT: 10.70 sec;   INR: 0.91 ratio         PTT - ( 21 Jun 2025 05:00 )  PTT:30.0 sec  Urinalysis Basic - ( 22 Jun 2025 06:09 )    Color: x / Appearance: x / SG: x / pH: x  Gluc: 111 mg/dL / Ketone: x  / Bili: x / Urobili: x   Blood: x / Protein: x / Nitrite: x   Leuk Esterase: x / RBC: x / WBC x   Sq Epi: x / Non Sq Epi: x / Bacteria: x      ABG - ( 20 Jun 2025 13:22 )  pH, Arterial: 7.36  pH, Blood: x     /  pCO2: 35    /  pO2: 116   / HCO3: 20    / Base Excess: -4.9  /  SaO2: 98.2                  Culture - Blood (collected 21 Jun 2025 05:00)  Source: Blood Blood-Venous  Preliminary Report (22 Jun 2025 10:01):    No growth at 24 hours    Culture - Blood (collected 20 Jun 2025 05:31)  Source: Blood None  Preliminary Report (21 Jun 2025 14:01):    No growth at 24 hours    Culture - Blood (collected 19 Jun 2025 22:29)  Source: Blood Blood-Peripheral  Preliminary Report (22 Jun 2025 03:02):    No growth at 48 Hours          CAPILLARY BLOOD GLUCOSE          PHYSICAL EXAM:  T(C): 36.3 (06-22-25 @ 08:13), Max: 36.3 (06-22-25 @ 08:13)  HR: 93 (06-22-25 @ 08:13) (84 - 100)  BP: 149/75 (06-22-25 @ 08:13) (120/70 - 173/64)  RR: 20 (06-22-25 @ 08:13) (18 - 20)  SpO2: 96% (06-22-25 @ 08:13) (90% - 99%)    GENERAL: NAD  HEAD:  Atraumatic, Normocephalic  EYES: EOMI, PERRLA, conjunctiva and sclera clear  CHEST/LUNG: decreased breath sounds bilaterally  HEART: Regular rate and rhythm  ABDOMEN: left flank tenderness, epigastric tenderness  EXTREMITIES:  2+ Peripheral Pulses, No edema  SKIN: No rashes or lesions    ADMISSION SUMMARY  Patient is a 79y old Male who presents with a chief complaint of SOB (22 Jun 2025 01:29)

## 2025-06-22 NOTE — DIETITIAN INITIAL EVALUATION ADULT - NUTRITIONGOAL OUTCOME1
Pt will meet >50% estimated needs in 4 days  high risk Pt will meet >50% estimated needs in 5-7 days  mod risk

## 2025-06-22 NOTE — PROGRESS NOTE ADULT - SUBJECTIVE AND OBJECTIVE BOX
GENERAL SURGERY PROGRESS NOTE    Patient: ANILA HORTON , 79y ([birth year])Male   MRN: [id number]  Location: [address]  Visit: 06-17-25 Inpatient  Date: 06-22-25 @ 01:30    Hospital Day #:  Post-Op Day #:    Procedure/Dx/Injuries:    Events of past 24 hours:    PAST MEDICAL & SURGICAL HISTORY:  Schizophrenia      No significant past surgical history          Vitals:   T(F): 96.8 (06-21-25 @ 20:05), Max: 97.1 (06-21-25 @ 11:48)  HR: 88 (06-21-25 @ 20:21)  BP: 143/66 (06-21-25 @ 20:05)  RR: 18 (06-21-25 @ 20:05)  SpO2: 95% (06-21-25 @ 20:45)      Diet, NPO:   Except Medications      Fluids:     I & O's:    06-20-25 @ 07:01  -  06-21-25 @ 07:00  --------------------------------------------------------  IN:    Lactated Ringers: 900 mL  Total IN: 900 mL    OUT:    Voided (mL): 1370 mL  Total OUT: 1370 mL    Total NET: -470 mL        Bowel Movement: : [] YES [] NO  Flatus: : [] YES [] NO    PHYSICAL EXAM:  General: NAD, AAOx3, calm and cooperative  HEENT: NCAT, FAN, EOMI, Trachea ML, Neck supple  Cardiac: RRR S1, S2, no Murmurs, rubs or gallops  Respiratory: CTAB, normal respiratory effort, breath sounds equal BL, no wheeze, rhonchi or crackles  Abdomen: Soft, non-distended, non-tender, no rebound, no guarding. +BS.  Rectal: Good tone, +stool, no blood, no ketan-anal masses/lesions, no fistulas, fissures, hemorrhoids  Musculoskeletal: Strength 5/5 BL UE/LE, ROM intact, compartments soft  Neuro: Sensation grossly intact and equal throughout, no focal deficits  Vascular: Pulses 2+ throughout, extremities well perfused  Skin: Warm/dry, normal color, no jaundice  Incision/wound: healing well, dressings in place, clean, dry and intact    MEDICATIONS  (STANDING):  albuterol    90 MICROgram(s) HFA Inhaler 2 Puff(s) Inhalation two times a day  albuterol/ipratropium for Nebulization 3 milliLiter(s) Nebulizer every 6 hours  amLODIPine   Tablet 10 milliGRAM(s) Oral daily  atorvastatin 40 milliGRAM(s) Oral at bedtime  buPROPion XL (24-Hour) . 150 milliGRAM(s) Oral daily  chlorhexidine 2% Cloths 1 Application(s) Topical <User Schedule>  diatrizoate meglumine/diatrizoate sodium. 30 milliLiter(s) Oral once  fluPHENAZine 10 milliGRAM(s) Oral daily  fluticasone propionate/ salmeterol 500-50 MICROgram(s) Diskus 1 Dose(s) Inhalation two times a day  folic acid Injectable 1 milliGRAM(s) IV Push daily  heparin   Injectable 5000 Unit(s) SubCutaneous every 8 hours  hydrALAZINE Injectable 10 milliGRAM(s) IV Push three times a day  lactulose Retention Enema 200 Gram(s) Rectal once  memantine 5 milliGRAM(s) Oral at bedtime  methylPREDNISolone sodium succinate Injectable 40 milliGRAM(s) IV Push every 12 hours  metoclopramide Injectable 10 milliGRAM(s) IV Push three times a day  nicotine -  14 mG/24Hr(s) Patch 1 Patch Transdermal daily  OLANZapine 20 milliGRAM(s) Oral at bedtime  pantoprazole  Injectable 40 milliGRAM(s) IV Push with breakfast  piperacillin/tazobactam IVPB.. 3.375 Gram(s) IV Intermittent every 8 hours  polyethylene glycol 3350 17 Gram(s) Oral two times a day  senna 2 Tablet(s) Oral at bedtime  thiamine Injectable 100 milliGRAM(s) IV Push daily  vancomycin  IVPB      vancomycin  IVPB 1250 milliGRAM(s) IV Intermittent every 24 hours    MEDICATIONS  (PRN):  acetaminophen     Tablet .. 650 milliGRAM(s) Oral every 6 hours PRN Mild Pain (1 - 3), Moderate Pain (4 - 6)  LORazepam   Injectable 0.5 milliGRAM(s) IV Push every 8 hours PRN Agitation/anxiety  melatonin 3 milliGRAM(s) Oral at bedtime PRN Insomnia  ondansetron Injectable 4 milliGRAM(s) IV Push every 8 hours PRN Nausea and/or Vomiting      DVT PROPHYLAXIS: heparin   Injectable 5000 Unit(s) SubCutaneous every 8 hours    GI PROPHYLAXIS: pantoprazole  Injectable 40 milliGRAM(s) IV Push with breakfast  ANTIBIOTICS:  piperacillin/tazobactam IVPB.. 3.375 Gram(s)  vancomycin  IVPB    vancomycin  IVPB 1250 milliGRAM(s)    LAB/STUDIES:  Labs:  CAPILLARY BLOOD GLUCOSE                        13.6   14.41 )-----------( 210      ( 21 Jun 2025 05:00 )             41.8       Auto Immature Granulocyte %: 1.4 % (06-21-25 @ 05:00)    06-21    136  |  104  |  36[H]  ----------------------------<  129[H]  5.4[H]   |  22  |  2.0[H]      Calcium: 8.5 mg/dL (06-21-25 @ 05:00)      LFTs:             6.3  | 0.3  | 21       ------------------[66      ( 21 Jun 2025 05:00 )  4.1  | x    | 17           Lactate, Blood: 1.3 mmol/L (06-21-25 @ 05:00)  Lactate, Blood: 2.2 mmol/L (06-21-25 @ 01:18)  Lactate, Blood: 2.8 mmol/L (06-20-25 @ 17:40)  Blood Gas Arterial, Lactate: 4.0 mmol/L (06-20-25 @ 13:22)  Lactate, Blood: 3.0 mmol/L (06-20-25 @ 11:25)  Lactate, Blood: 4.6 mmol/L (06-20-25 @ 05:31)  Blood Gas Arterial, Lactate: 2.6 mmol/L (06-20-25 @ 00:16)  Lactate, Blood: 2.8 mmol/L (06-19-25 @ 22:30)    ABG - ( 20 Jun 2025 13:22 )  pH: 7.36  /  pCO2: 35    /  pO2: 116   / HCO3: 20    / Base Excess: -4.9  /  SaO2: 98.2            ABG - ( 20 Jun 2025 00:16 )  pH: 7.39  /  pCO2: 32    /  pO2: 197   / HCO3: 19    / Base Excess: -4.5  /  SaO2: 99.0        Coags:     10.70  ----< 0.91    ( 21 Jun 2025 05:00 )     30.0     Culture - Blood (collected 20 Jun 2025 05:31)  Source: Blood None  Preliminary Report (21 Jun 2025 14:01):    No growth at 24 hours    Culture - Blood (collected 19 Jun 2025 22:29)  Source: Blood Blood-Peripheral  Preliminary Report (21 Jun 2025 03:02):    No growth at 24 hours    Culture - Blood (collected 19 Jun 2025 07:55)  Source: Blood None  Preliminary Report (21 Jun 2025 17:01):    No growth at 48 Hours    IMAGING:  < from: Xray Abdomen 2 View PORTABLE -Urgent (Xray Abdomen 2 View PORTABLE -Urgent .) (06.21.25 @ 09:43) >  IMPRESSION: Normal bowel gas pattern GENERAL SURGERY PROGRESS NOTE    Patient: ANILA HORTON , 79y ([birth year])Male   MRN: [id number]  Location: [address]  Visit: 06-17-25 Inpatient  Date: 06-22-25 @ 01:30    Events of past 24 hours: patient seen and examined at bedside. on BIPAP 60/6, resting comfortably.     PAST MEDICAL & SURGICAL HISTORY:  Schizophrenia      No significant past surgical history          Vitals:   T(F): 96.8 (06-21-25 @ 20:05), Max: 97.1 (06-21-25 @ 11:48)  HR: 88 (06-21-25 @ 20:21)  BP: 143/66 (06-21-25 @ 20:05)  RR: 18 (06-21-25 @ 20:05)  SpO2: 95% (06-21-25 @ 20:45)      Diet, NPO:   Except Medications      Fluids:     I & O's:    06-20-25 @ 07:01  -  06-21-25 @ 07:00  --------------------------------------------------------  IN:    Lactated Ringers: 900 mL  Total IN: 900 mL    OUT:    Voided (mL): 1370 mL  Total OUT: 1370 mL    Total NET: -470 mL        Bowel Movement: : [] YES [] NO  Flatus: : [] YES [] NO    PHYSICAL EXAM:  General: NAD, AAOx3, calm and cooperative  HEENT: NCAT, FAN, EOMI, Trachea ML, Neck supple  Cardiac: RRR S1, S2, no Murmurs, rubs or gallops  Respiratory: CTAB, normal respiratory effort, breath sounds equal BL, no wheeze, rhonchi or crackles  Abdomen: Soft, non-distended, non-tender, no rebound, no guarding. +BS.  Rectal: Good tone, +stool, no blood, no ketan-anal masses/lesions, no fistulas, fissures, hemorrhoids  Musculoskeletal: Strength 5/5 BL UE/LE, ROM intact, compartments soft  Neuro: Sensation grossly intact and equal throughout, no focal deficits  Vascular: Pulses 2+ throughout, extremities well perfused  Skin: Warm/dry, normal color, no jaundice  Incision/wound: healing well, dressings in place, clean, dry and intact    MEDICATIONS  (STANDING):  albuterol    90 MICROgram(s) HFA Inhaler 2 Puff(s) Inhalation two times a day  albuterol/ipratropium for Nebulization 3 milliLiter(s) Nebulizer every 6 hours  amLODIPine   Tablet 10 milliGRAM(s) Oral daily  atorvastatin 40 milliGRAM(s) Oral at bedtime  buPROPion XL (24-Hour) . 150 milliGRAM(s) Oral daily  chlorhexidine 2% Cloths 1 Application(s) Topical <User Schedule>  diatrizoate meglumine/diatrizoate sodium. 30 milliLiter(s) Oral once  fluPHENAZine 10 milliGRAM(s) Oral daily  fluticasone propionate/ salmeterol 500-50 MICROgram(s) Diskus 1 Dose(s) Inhalation two times a day  folic acid Injectable 1 milliGRAM(s) IV Push daily  heparin   Injectable 5000 Unit(s) SubCutaneous every 8 hours  hydrALAZINE Injectable 10 milliGRAM(s) IV Push three times a day  lactulose Retention Enema 200 Gram(s) Rectal once  memantine 5 milliGRAM(s) Oral at bedtime  methylPREDNISolone sodium succinate Injectable 40 milliGRAM(s) IV Push every 12 hours  metoclopramide Injectable 10 milliGRAM(s) IV Push three times a day  nicotine -  14 mG/24Hr(s) Patch 1 Patch Transdermal daily  OLANZapine 20 milliGRAM(s) Oral at bedtime  pantoprazole  Injectable 40 milliGRAM(s) IV Push with breakfast  piperacillin/tazobactam IVPB.. 3.375 Gram(s) IV Intermittent every 8 hours  polyethylene glycol 3350 17 Gram(s) Oral two times a day  senna 2 Tablet(s) Oral at bedtime  thiamine Injectable 100 milliGRAM(s) IV Push daily  vancomycin  IVPB      vancomycin  IVPB 1250 milliGRAM(s) IV Intermittent every 24 hours    MEDICATIONS  (PRN):  acetaminophen     Tablet .. 650 milliGRAM(s) Oral every 6 hours PRN Mild Pain (1 - 3), Moderate Pain (4 - 6)  LORazepam   Injectable 0.5 milliGRAM(s) IV Push every 8 hours PRN Agitation/anxiety  melatonin 3 milliGRAM(s) Oral at bedtime PRN Insomnia  ondansetron Injectable 4 milliGRAM(s) IV Push every 8 hours PRN Nausea and/or Vomiting      DVT PROPHYLAXIS: heparin   Injectable 5000 Unit(s) SubCutaneous every 8 hours    GI PROPHYLAXIS: pantoprazole  Injectable 40 milliGRAM(s) IV Push with breakfast  ANTIBIOTICS:  piperacillin/tazobactam IVPB.. 3.375 Gram(s)  vancomycin  IVPB    vancomycin  IVPB 1250 milliGRAM(s)    LAB/STUDIES:  Labs:  CAPILLARY BLOOD GLUCOSE                        13.6   14.41 )-----------( 210      ( 21 Jun 2025 05:00 )             41.8       Auto Immature Granulocyte %: 1.4 % (06-21-25 @ 05:00)    06-21    136  |  104  |  36[H]  ----------------------------<  129[H]  5.4[H]   |  22  |  2.0[H]      Calcium: 8.5 mg/dL (06-21-25 @ 05:00)      LFTs:             6.3  | 0.3  | 21       ------------------[66      ( 21 Jun 2025 05:00 )  4.1  | x    | 17           Lactate, Blood: 1.3 mmol/L (06-21-25 @ 05:00)  Lactate, Blood: 2.2 mmol/L (06-21-25 @ 01:18)  Lactate, Blood: 2.8 mmol/L (06-20-25 @ 17:40)  Blood Gas Arterial, Lactate: 4.0 mmol/L (06-20-25 @ 13:22)  Lactate, Blood: 3.0 mmol/L (06-20-25 @ 11:25)  Lactate, Blood: 4.6 mmol/L (06-20-25 @ 05:31)  Blood Gas Arterial, Lactate: 2.6 mmol/L (06-20-25 @ 00:16)  Lactate, Blood: 2.8 mmol/L (06-19-25 @ 22:30)    ABG - ( 20 Jun 2025 13:22 )  pH: 7.36  /  pCO2: 35    /  pO2: 116   / HCO3: 20    / Base Excess: -4.9  /  SaO2: 98.2            ABG - ( 20 Jun 2025 00:16 )  pH: 7.39  /  pCO2: 32    /  pO2: 197   / HCO3: 19    / Base Excess: -4.5  /  SaO2: 99.0        Coags:     10.70  ----< 0.91    ( 21 Jun 2025 05:00 )     30.0     Culture - Blood (collected 20 Jun 2025 05:31)  Source: Blood None  Preliminary Report (21 Jun 2025 14:01):    No growth at 24 hours    Culture - Blood (collected 19 Jun 2025 22:29)  Source: Blood Blood-Peripheral  Preliminary Report (21 Jun 2025 03:02):    No growth at 24 hours    Culture - Blood (collected 19 Jun 2025 07:55)  Source: Blood None  Preliminary Report (21 Jun 2025 17:01):    No growth at 48 Hours    IMAGING:  < from: Xray Abdomen 2 View PORTABLE -Urgent (Xray Abdomen 2 View PORTABLE -Urgent .) (06.21.25 @ 09:43) >  IMPRESSION: Normal bowel gas pattern GENERAL SURGERY PROGRESS NOTE    Patient: ANILA HORTON , 79y ([birth year])Male   MRN: [id number]  Location: [address]  Visit: 06-17-25 Inpatient  Date: 06-22-25 @ 01:30    Events of past 24 hours: patient seen and examined at bedside. on BIPAP 60/6, resting comfortably. Denies abdominal pain. No bowel movement. Refusing enema.     PAST MEDICAL & SURGICAL HISTORY:  Schizophrenia      No significant past surgical history          Vitals:   T(F): 96.8 (06-21-25 @ 20:05), Max: 97.1 (06-21-25 @ 11:48)  HR: 88 (06-21-25 @ 20:21)  BP: 143/66 (06-21-25 @ 20:05)  RR: 18 (06-21-25 @ 20:05)  SpO2: 95% (06-21-25 @ 20:45)      Diet, NPO:   Except Medications      Fluids:     I & O's:    06-20-25 @ 07:01  -  06-21-25 @ 07:00  --------------------------------------------------------  IN:    Lactated Ringers: 900 mL  Total IN: 900 mL    OUT:    Voided (mL): 1370 mL  Total OUT: 1370 mL    Total NET: -470 mL      PHYSICAL EXAM:  General: NAD, AAOx3, calm and cooperative  Respiratory: BiPAP 60/6  Abdomen: Soft, mildly-distended, non-tender, no rebound, no guarding  Vascular: Extremities warm and well perfused  Skin: Warm/dry, normal color, no jaundice      MEDICATIONS  (STANDING):  albuterol    90 MICROgram(s) HFA Inhaler 2 Puff(s) Inhalation two times a day  albuterol/ipratropium for Nebulization 3 milliLiter(s) Nebulizer every 6 hours  amLODIPine   Tablet 10 milliGRAM(s) Oral daily  atorvastatin 40 milliGRAM(s) Oral at bedtime  buPROPion XL (24-Hour) . 150 milliGRAM(s) Oral daily  chlorhexidine 2% Cloths 1 Application(s) Topical <User Schedule>  diatrizoate meglumine/diatrizoate sodium. 30 milliLiter(s) Oral once  fluPHENAZine 10 milliGRAM(s) Oral daily  fluticasone propionate/ salmeterol 500-50 MICROgram(s) Diskus 1 Dose(s) Inhalation two times a day  folic acid Injectable 1 milliGRAM(s) IV Push daily  heparin   Injectable 5000 Unit(s) SubCutaneous every 8 hours  hydrALAZINE Injectable 10 milliGRAM(s) IV Push three times a day  lactulose Retention Enema 200 Gram(s) Rectal once  memantine 5 milliGRAM(s) Oral at bedtime  methylPREDNISolone sodium succinate Injectable 40 milliGRAM(s) IV Push every 12 hours  metoclopramide Injectable 10 milliGRAM(s) IV Push three times a day  nicotine -  14 mG/24Hr(s) Patch 1 Patch Transdermal daily  OLANZapine 20 milliGRAM(s) Oral at bedtime  pantoprazole  Injectable 40 milliGRAM(s) IV Push with breakfast  piperacillin/tazobactam IVPB.. 3.375 Gram(s) IV Intermittent every 8 hours  polyethylene glycol 3350 17 Gram(s) Oral two times a day  senna 2 Tablet(s) Oral at bedtime  thiamine Injectable 100 milliGRAM(s) IV Push daily  vancomycin  IVPB      vancomycin  IVPB 1250 milliGRAM(s) IV Intermittent every 24 hours    MEDICATIONS  (PRN):  acetaminophen     Tablet .. 650 milliGRAM(s) Oral every 6 hours PRN Mild Pain (1 - 3), Moderate Pain (4 - 6)  LORazepam   Injectable 0.5 milliGRAM(s) IV Push every 8 hours PRN Agitation/anxiety  melatonin 3 milliGRAM(s) Oral at bedtime PRN Insomnia  ondansetron Injectable 4 milliGRAM(s) IV Push every 8 hours PRN Nausea and/or Vomiting      DVT PROPHYLAXIS: heparin   Injectable 5000 Unit(s) SubCutaneous every 8 hours    GI PROPHYLAXIS: pantoprazole  Injectable 40 milliGRAM(s) IV Push with breakfast  ANTIBIOTICS:  piperacillin/tazobactam IVPB.. 3.375 Gram(s)  vancomycin  IVPB    vancomycin  IVPB 1250 milliGRAM(s)    LAB/STUDIES:  Labs:  CAPILLARY BLOOD GLUCOSE                        13.6   14.41 )-----------( 210      ( 21 Jun 2025 05:00 )             41.8       Auto Immature Granulocyte %: 1.4 % (06-21-25 @ 05:00)    06-21    136  |  104  |  36[H]  ----------------------------<  129[H]  5.4[H]   |  22  |  2.0[H]      Calcium: 8.5 mg/dL (06-21-25 @ 05:00)      LFTs:             6.3  | 0.3  | 21       ------------------[66      ( 21 Jun 2025 05:00 )  4.1  | x    | 17           Lactate, Blood: 1.3 mmol/L (06-21-25 @ 05:00)  Lactate, Blood: 2.2 mmol/L (06-21-25 @ 01:18)  Lactate, Blood: 2.8 mmol/L (06-20-25 @ 17:40)  Blood Gas Arterial, Lactate: 4.0 mmol/L (06-20-25 @ 13:22)  Lactate, Blood: 3.0 mmol/L (06-20-25 @ 11:25)  Lactate, Blood: 4.6 mmol/L (06-20-25 @ 05:31)  Blood Gas Arterial, Lactate: 2.6 mmol/L (06-20-25 @ 00:16)  Lactate, Blood: 2.8 mmol/L (06-19-25 @ 22:30)    ABG - ( 20 Jun 2025 13:22 )  pH: 7.36  /  pCO2: 35    /  pO2: 116   / HCO3: 20    / Base Excess: -4.9  /  SaO2: 98.2            ABG - ( 20 Jun 2025 00:16 )  pH: 7.39  /  pCO2: 32    /  pO2: 197   / HCO3: 19    / Base Excess: -4.5  /  SaO2: 99.0        Coags:     10.70  ----< 0.91    ( 21 Jun 2025 05:00 )     30.0     Culture - Blood (collected 20 Jun 2025 05:31)  Source: Blood None  Preliminary Report (21 Jun 2025 14:01):    No growth at 24 hours    Culture - Blood (collected 19 Jun 2025 22:29)  Source: Blood Blood-Peripheral  Preliminary Report (21 Jun 2025 03:02):    No growth at 24 hours    Culture - Blood (collected 19 Jun 2025 07:55)  Source: Blood None  Preliminary Report (21 Jun 2025 17:01):    No growth at 48 Hours    IMAGING:  < from: Xray Abdomen 2 View PORTABLE -Urgent (Xray Abdomen 2 View PORTABLE -Urgent .) (06.21.25 @ 09:43) >  IMPRESSION: Normal bowel gas pattern

## 2025-06-22 NOTE — PROGRESS NOTE ADULT - ASSESSMENT
79-year-old male with past medical history of HTN, MDD, schizophrenia, dementia presents to the ED for left lower quadrant abdominal pain associated shortness of breath and cough. pt states he has been having this sudden onset of SOB and cough with deep inspiration. States he is an active smoker. Vitals on admission significant for BP of 200/100 and 98% on 2L NC.   RR called overnight for AHRF now on JFNC, upgraded to SDU.    #Acute hypoxemic resp failure on HHFNC/ NIV  #Aspiration PNA  #COPD exacerbation  #Left side Abd pain 2/2 constipation  #Acute on chronic renal failure  #Lactic acidosis  #HO HTN, MDD, schizophrenia, dementia     - CTA Chest: Negative for pulmonary emboli. Emphysematous changes. Bibasilar atelectasis. Areas of ground glass attenuation which could reflect air trapping/airway disease.  - CT A/P: no acute abdominal findings  - NIV 4 hrs on/off, alternate with HFNC  - continuing to have abdominal pain, KUB 6/20 showing large fecal burden in ascending colon. per discussion with ID Dr. Steve, recommended CT A/P with PO contrast  - Gen Surg following, no further recs as abdominal pain improved.   - transitioned to HFNC  - MRSA negative, d/c vanc. C/w zosyn per pulm/CC due to CXR 6/21s showing aspiration event overnight  - aspiration precautions, HOB 45 degrees. Reglan. Feeding per S/S eval  - bowel regimen, tap water enemas    DVT ppx: heparin subQ  Full Code    Pending/Handoff: ID f/u, wean O2 79-year-old male with past medical history of HTN, MDD, schizophrenia, dementia presents to the ED for left lower quadrant abdominal pain associated shortness of breath and cough. pt states he has been having this sudden onset of SOB and cough with deep inspiration. States he is an active smoker. Vitals on admission significant for BP of 200/100 and 98% on 2L NC.   RR called overnight for AHRF now on JFNC, upgraded to SDU.    #Acute hypoxemic resp failure on HHFNC/ NIV  #Aspiration PNA  #COPD exacerbation  #Left side Abd pain 2/2 constipation  #Acute on chronic renal failure  #Lactic acidosis  #HO HTN, MDD, schizophrenia, dementia     - CTA Chest: Negative for pulmonary emboli. Emphysematous changes. Bibasilar atelectasis. Areas of ground glass attenuation which could reflect air trapping/airway disease.  - CT A/P: no acute abdominal findings  - MRSA negative, d/c vanc. C/w zosyn per pulm/CC due to CXR 6/21s showing aspiration event overnight  - c/w solumedrol 40mg IV q12hrs, nebs q6hrs standing  - NIV 4 hrs on/off, alternate with HFNC  - continuing to have abdominal pain, KUB 6/20 showing large fecal burden in ascending colon. per discussion with ID Dr. Steve, recommended CT A/P with PO contrast  - Gen Surg following, no further recs as abdominal pain improved.   - aspiration precautions, HOB 45 degrees. Reglan. Feeding per S/S eval  - bowel regimen, tap water enemas    DVT ppx: heparin subQ  Full Code    Pending/Handoff: ID f/u, wean O2 Adult

## 2025-06-23 LAB
ALBUMIN SERPL ELPH-MCNC: 4.1 G/DL — SIGNIFICANT CHANGE UP (ref 3.5–5.2)
ALP SERPL-CCNC: 54 U/L — SIGNIFICANT CHANGE UP (ref 30–115)
ALT FLD-CCNC: 15 U/L — SIGNIFICANT CHANGE UP (ref 0–41)
ANION GAP SERPL CALC-SCNC: 13 MMOL/L — SIGNIFICANT CHANGE UP (ref 7–14)
AST SERPL-CCNC: 22 U/L — SIGNIFICANT CHANGE UP (ref 0–41)
BASOPHILS # BLD AUTO: 0.03 K/UL — SIGNIFICANT CHANGE UP (ref 0–0.2)
BASOPHILS NFR BLD AUTO: 0.2 % — SIGNIFICANT CHANGE UP (ref 0–1)
BILIRUB SERPL-MCNC: 0.5 MG/DL — SIGNIFICANT CHANGE UP (ref 0.2–1.2)
BUN SERPL-MCNC: 39 MG/DL — HIGH (ref 10–20)
CALCIUM SERPL-MCNC: 8.5 MG/DL — SIGNIFICANT CHANGE UP (ref 8.4–10.5)
CHLORIDE SERPL-SCNC: 104 MMOL/L — SIGNIFICANT CHANGE UP (ref 98–110)
CO2 SERPL-SCNC: 21 MMOL/L — SIGNIFICANT CHANGE UP (ref 17–32)
CREAT SERPL-MCNC: 2.1 MG/DL — HIGH (ref 0.7–1.5)
EGFR: 32 ML/MIN/1.73M2 — LOW
EGFR: 32 ML/MIN/1.73M2 — LOW
EOSINOPHIL # BLD AUTO: 0 K/UL — SIGNIFICANT CHANGE UP (ref 0–0.7)
EOSINOPHIL NFR BLD AUTO: 0 % — SIGNIFICANT CHANGE UP (ref 0–8)
GLUCOSE SERPL-MCNC: 132 MG/DL — HIGH (ref 70–99)
HCT VFR BLD CALC: 40.6 % — LOW (ref 42–52)
HGB BLD-MCNC: 13.1 G/DL — LOW (ref 14–18)
IMM GRANULOCYTES NFR BLD AUTO: 2 % — HIGH (ref 0.1–0.3)
LACTATE SERPL-SCNC: 1.3 MMOL/L — SIGNIFICANT CHANGE UP (ref 0.7–2)
LYMPHOCYTES # BLD AUTO: 1.03 K/UL — LOW (ref 1.2–3.4)
LYMPHOCYTES # BLD AUTO: 7.6 % — LOW (ref 20.5–51.1)
MAGNESIUM SERPL-MCNC: 2.6 MG/DL — HIGH (ref 1.8–2.4)
MCHC RBC-ENTMCNC: 29.4 PG — SIGNIFICANT CHANGE UP (ref 27–31)
MCHC RBC-ENTMCNC: 32.3 G/DL — SIGNIFICANT CHANGE UP (ref 32–37)
MCV RBC AUTO: 91.2 FL — SIGNIFICANT CHANGE UP (ref 80–94)
MONOCYTES # BLD AUTO: 1.22 K/UL — HIGH (ref 0.1–0.6)
MONOCYTES NFR BLD AUTO: 9 % — SIGNIFICANT CHANGE UP (ref 1.7–9.3)
NEUTROPHILS # BLD AUTO: 11.07 K/UL — HIGH (ref 1.4–6.5)
NEUTROPHILS NFR BLD AUTO: 81.2 % — HIGH (ref 42.2–75.2)
NRBC BLD AUTO-RTO: 0 /100 WBCS — SIGNIFICANT CHANGE UP (ref 0–0)
PHOSPHATE SERPL-MCNC: 4 MG/DL — SIGNIFICANT CHANGE UP (ref 2.1–4.9)
PLATELET # BLD AUTO: 195 K/UL — SIGNIFICANT CHANGE UP (ref 130–400)
PMV BLD: 10.7 FL — HIGH (ref 7.4–10.4)
POTASSIUM SERPL-MCNC: 4.7 MMOL/L — SIGNIFICANT CHANGE UP (ref 3.5–5)
POTASSIUM SERPL-SCNC: 4.7 MMOL/L — SIGNIFICANT CHANGE UP (ref 3.5–5)
PROT SERPL-MCNC: 6.2 G/DL — SIGNIFICANT CHANGE UP (ref 6–8)
RBC # BLD: 4.45 M/UL — LOW (ref 4.7–6.1)
RBC # FLD: 14 % — SIGNIFICANT CHANGE UP (ref 11.5–14.5)
SODIUM SERPL-SCNC: 138 MMOL/L — SIGNIFICANT CHANGE UP (ref 135–146)
WBC # BLD: 13.62 K/UL — HIGH (ref 4.8–10.8)
WBC # FLD AUTO: 13.62 K/UL — HIGH (ref 4.8–10.8)

## 2025-06-23 PROCEDURE — 99291 CRITICAL CARE FIRST HOUR: CPT

## 2025-06-23 PROCEDURE — 99233 SBSQ HOSP IP/OBS HIGH 50: CPT

## 2025-06-23 RX ORDER — TAMSULOSIN HYDROCHLORIDE 0.4 MG/1
0.4 CAPSULE ORAL AT BEDTIME
Refills: 0 | Status: DISCONTINUED | OUTPATIENT
Start: 2025-06-23 | End: 2025-07-01

## 2025-06-23 RX ORDER — IOHEXOL 350 MG/ML
30 INJECTION, SOLUTION INTRAVENOUS ONCE
Refills: 0 | Status: COMPLETED | OUTPATIENT
Start: 2025-06-23 | End: 2025-06-23

## 2025-06-23 RX ORDER — IPRATROPIUM BROMIDE AND ALBUTEROL SULFATE .5; 2.5 MG/3ML; MG/3ML
3 SOLUTION RESPIRATORY (INHALATION) EVERY 6 HOURS
Refills: 0 | Status: DISCONTINUED | OUTPATIENT
Start: 2025-06-23 | End: 2025-07-01

## 2025-06-23 RX ORDER — METHYLPREDNISOLONE ACETATE 80 MG/ML
40 INJECTION, SUSPENSION INTRA-ARTICULAR; INTRALESIONAL; INTRAMUSCULAR; SOFT TISSUE EVERY 24 HOURS
Refills: 0 | Status: DISCONTINUED | OUTPATIENT
Start: 2025-06-24 | End: 2025-06-26

## 2025-06-23 RX ADMIN — MEMANTINE HYDROCHLORIDE 5 MILLIGRAM(S): 21 CAPSULE, EXTENDED RELEASE ORAL at 22:38

## 2025-06-23 RX ADMIN — ATORVASTATIN CALCIUM 40 MILLIGRAM(S): 80 TABLET, FILM COATED ORAL at 21:12

## 2025-06-23 RX ADMIN — Medication 40 MILLIGRAM(S): at 08:17

## 2025-06-23 RX ADMIN — Medication 10 MILLIGRAM(S): at 05:30

## 2025-06-23 RX ADMIN — Medication 1 DOSE(S): at 08:17

## 2025-06-23 RX ADMIN — Medication 90 MILLILITER(S): at 10:11

## 2025-06-23 RX ADMIN — AMLODIPINE BESYLATE 10 MILLIGRAM(S): 10 TABLET ORAL at 05:30

## 2025-06-23 RX ADMIN — Medication 10 MILLIGRAM(S): at 13:01

## 2025-06-23 RX ADMIN — IPRATROPIUM BROMIDE AND ALBUTEROL SULFATE 3 MILLILITER(S): .5; 2.5 SOLUTION RESPIRATORY (INHALATION) at 08:04

## 2025-06-23 RX ADMIN — FOLIC ACID 1 MILLIGRAM(S): 1 TABLET ORAL at 11:47

## 2025-06-23 RX ADMIN — Medication 25 GRAM(S): at 21:09

## 2025-06-23 RX ADMIN — HEPARIN SODIUM 5000 UNIT(S): 1000 INJECTION INTRAVENOUS; SUBCUTANEOUS at 13:00

## 2025-06-23 RX ADMIN — OLANZAPINE 20 MILLIGRAM(S): 10 TABLET ORAL at 21:12

## 2025-06-23 RX ADMIN — Medication 1 APPLICATION(S): at 05:36

## 2025-06-23 RX ADMIN — Medication 2 TABLET(S): at 21:12

## 2025-06-23 RX ADMIN — Medication 25 GRAM(S): at 05:29

## 2025-06-23 RX ADMIN — METHYLPREDNISOLONE ACETATE 40 MILLIGRAM(S): 80 INJECTION, SUSPENSION INTRA-ARTICULAR; INTRALESIONAL; INTRAMUSCULAR; SOFT TISSUE at 05:30

## 2025-06-23 RX ADMIN — Medication 1 DOSE(S): at 21:11

## 2025-06-23 RX ADMIN — Medication 10 MILLIGRAM(S): at 21:13

## 2025-06-23 RX ADMIN — NICOTINE POLACRILEX 1 PATCH: 4 GUM, CHEWING ORAL at 20:00

## 2025-06-23 RX ADMIN — LACTULOSE 20 GRAM(S): 10 SOLUTION ORAL at 11:48

## 2025-06-23 RX ADMIN — Medication 10 MILLIGRAM(S): at 21:14

## 2025-06-23 RX ADMIN — Medication 10 MILLIGRAM(S): at 11:48

## 2025-06-23 RX ADMIN — NICOTINE POLACRILEX 1 PATCH: 4 GUM, CHEWING ORAL at 11:49

## 2025-06-23 RX ADMIN — HEPARIN SODIUM 5000 UNIT(S): 1000 INJECTION INTRAVENOUS; SUBCUTANEOUS at 21:12

## 2025-06-23 RX ADMIN — BUPROPION HYDROBROMIDE 150 MILLIGRAM(S): 522 TABLET, EXTENDED RELEASE ORAL at 11:48

## 2025-06-23 RX ADMIN — Medication 10 MILLIGRAM(S): at 17:01

## 2025-06-23 RX ADMIN — HEPARIN SODIUM 5000 UNIT(S): 1000 INJECTION INTRAVENOUS; SUBCUTANEOUS at 05:31

## 2025-06-23 RX ADMIN — NICOTINE POLACRILEX 1 PATCH: 4 GUM, CHEWING ORAL at 11:00

## 2025-06-23 RX ADMIN — TAMSULOSIN HYDROCHLORIDE 0.4 MILLIGRAM(S): 0.4 CAPSULE ORAL at 21:12

## 2025-06-23 RX ADMIN — Medication 100 MILLIGRAM(S): at 11:48

## 2025-06-23 RX ADMIN — Medication 25 GRAM(S): at 13:02

## 2025-06-23 RX ADMIN — NICOTINE POLACRILEX 1 PATCH: 4 GUM, CHEWING ORAL at 07:00

## 2025-06-23 NOTE — SWALLOW BEDSIDE ASSESSMENT ADULT - SWALLOW EVAL: DIAGNOSIS
+overt s/s aspiration for thins. + toleration observed without overt symptoms of penetration/aspiration for Mildly thick via small sips.

## 2025-06-23 NOTE — PROGRESS NOTE ADULT - SUBJECTIVE AND OBJECTIVE BOX
24H events:    Today is hospital day 6d. This morning patient was seen and examined at bedside on HFNC and NIV. He complains of dry cough and L-sided abdominal pain. He was seen by speech pathology who noted he was able to take in thick medium consistency.  No acute or major events overnight.    PAST MEDICAL & SURGICAL HISTORY  Schizophrenia    No significant past surgical history        SOCIAL HISTORY:  Social History:      ALLERGIES:  No Known Allergies      MEDICATIONS:  STANDING MEDICATIONS  albuterol    90 MICROgram(s) HFA Inhaler 2 Puff(s) Inhalation two times a day  amLODIPine   Tablet 10 milliGRAM(s) Oral daily  atorvastatin 40 milliGRAM(s) Oral at bedtime  buPROPion XL (24-Hour) . 150 milliGRAM(s) Oral daily  chlorhexidine 2% Cloths 1 Application(s) Topical <User Schedule>  dicyclomine 10 milliGRAM(s) Oral three times a day before meals  fluPHENAZine 10 milliGRAM(s) Oral daily  fluticasone propionate/ salmeterol 500-50 MICROgram(s) Diskus 1 Dose(s) Inhalation two times a day  folic acid Injectable 1 milliGRAM(s) IV Push daily  heparin   Injectable 5000 Unit(s) SubCutaneous every 8 hours  hydrALAZINE Injectable 10 milliGRAM(s) IV Push three times a day  iohexol 300 mG (iodine)/mL Oral Solution 30 milliLiter(s) Oral once  lactulose Syrup 20 Gram(s) Oral daily  memantine 5 milliGRAM(s) Oral at bedtime  metoclopramide Injectable 10 milliGRAM(s) IV Push three times a day  nicotine -  14 mG/24Hr(s) Patch 1 Patch Transdermal daily  OLANZapine 20 milliGRAM(s) Oral at bedtime  pantoprazole  Injectable 40 milliGRAM(s) IV Push with breakfast  piperacillin/tazobactam IVPB.. 3.375 Gram(s) IV Intermittent every 8 hours  polyethylene glycol 3350 17 Gram(s) Oral two times a day  senna 2 Tablet(s) Oral at bedtime  sodium chloride 0.9%. 1000 milliLiter(s) IV Continuous <Continuous>  tamsulosin 0.4 milliGRAM(s) Oral at bedtime  thiamine Injectable 100 milliGRAM(s) IV Push daily    PRN MEDICATIONS  acetaminophen     Tablet .. 650 milliGRAM(s) Oral every 6 hours PRN  albuterol/ipratropium for Nebulization 3 milliLiter(s) Nebulizer every 6 hours PRN  LORazepam   Injectable 0.5 milliGRAM(s) IV Push every 8 hours PRN  magnesium hydroxide Suspension 30 milliLiter(s) Oral daily PRN  melatonin 3 milliGRAM(s) Oral at bedtime PRN  ondansetron Injectable 4 milliGRAM(s) IV Push every 8 hours PRN      VITALS:   T(C): 36.4 (06-23-25 @ 11:30), Max: 36.7 (06-22-25 @ 20:10)  HR: 81 (06-23-25 @ 11:30) (80 - 107)  BP: 133/65 (06-23-25 @ 11:30) (130/64 - 142/63)  RR: 20 (06-23-25 @ 11:30) (20 - 20)  SpO2: 96% (06-23-25 @ 11:30) (96% - 99%)  I&O's Summary    22 Jun 2025 07:01  -  23 Jun 2025 07:00  --------------------------------------------------------  IN: 750 mL / OUT: 925 mL / NET: -175 mL    23 Jun 2025 07:01  -  23 Jun 2025 15:42  --------------------------------------------------------  IN: 640 mL / OUT: 350 mL / NET: 290 mL          PHYSICAL EXAM:  GENERAL: well built, well nourished  NERVOUS SYSTEM:  Alert & Oriented X3,  motor and  sensory intact  CHEST/LUNG: B/L labored breathing; No rales, rhonchi, or wheezing  HEART: S1S2 normal, no S3, Regular rate and rhythm; No murmurs heard  ABDOMEN: Nondistended, tender along L side, Bowel sounds present  EXTREMITIES:  2+ Peripheral Pulses, No edema  SKIN: No rashes or lesions    LABS:                        13.1   13.62 )-----------( 195      ( 23 Jun 2025 05:12 )             40.6     06-23    138  |  104  |  39[H]  ----------------------------<  132[H]  4.7   |  21  |  2.1[H]    Ca    8.5      23 Jun 2025 05:12  Phos  4.0     06-23  Mg     2.6     06-23    TPro  6.2  /  Alb  4.1  /  TBili  0.5  /  DBili  x   /  AST  22  /  ALT  15  /  AlkPhos  54  06-23      Urinalysis Basic - ( 23 Jun 2025 05:12 )    Color: x / Appearance: x / SG: x / pH: x  Gluc: 132 mg/dL / Ketone: x  / Bili: x / Urobili: x   Blood: x / Protein: x / Nitrite: x   Leuk Esterase: x / RBC: x / WBC x   Sq Epi: x / Non Sq Epi: x / Bacteria: x        Lactate, Blood: 1.3 mmol/L (06-23-25 @ 11:03)      Culture - Blood (collected 22 Jun 2025 06:09)  Source: Blood None  Preliminary Report (23 Jun 2025 13:02):    No growth at 24 hours    Culture - Blood (collected 21 Jun 2025 05:00)  Source: Blood Blood-Venous  Preliminary Report (23 Jun 2025 10:01):    No growth at 48 Hours               24H events:    Today is hospital day 6d. This morning patient was seen and examined at bedside on HFNC and NIV. He complains of dry cough and L-sided abdominal pain. He was seen by speech pathology who noted he was able to take in thick liquid consistency.  No acute or major events overnight.    PAST MEDICAL & SURGICAL HISTORY  Schizophrenia    No significant past surgical history        SOCIAL HISTORY:  Social History:      ALLERGIES:  No Known Allergies      MEDICATIONS:  STANDING MEDICATIONS  albuterol    90 MICROgram(s) HFA Inhaler 2 Puff(s) Inhalation two times a day  amLODIPine   Tablet 10 milliGRAM(s) Oral daily  atorvastatin 40 milliGRAM(s) Oral at bedtime  buPROPion XL (24-Hour) . 150 milliGRAM(s) Oral daily  chlorhexidine 2% Cloths 1 Application(s) Topical <User Schedule>  dicyclomine 10 milliGRAM(s) Oral three times a day before meals  fluPHENAZine 10 milliGRAM(s) Oral daily  fluticasone propionate/ salmeterol 500-50 MICROgram(s) Diskus 1 Dose(s) Inhalation two times a day  folic acid Injectable 1 milliGRAM(s) IV Push daily  heparin   Injectable 5000 Unit(s) SubCutaneous every 8 hours  hydrALAZINE Injectable 10 milliGRAM(s) IV Push three times a day  iohexol 300 mG (iodine)/mL Oral Solution 30 milliLiter(s) Oral once  lactulose Syrup 20 Gram(s) Oral daily  memantine 5 milliGRAM(s) Oral at bedtime  metoclopramide Injectable 10 milliGRAM(s) IV Push three times a day  nicotine -  14 mG/24Hr(s) Patch 1 Patch Transdermal daily  OLANZapine 20 milliGRAM(s) Oral at bedtime  pantoprazole  Injectable 40 milliGRAM(s) IV Push with breakfast  piperacillin/tazobactam IVPB.. 3.375 Gram(s) IV Intermittent every 8 hours  polyethylene glycol 3350 17 Gram(s) Oral two times a day  senna 2 Tablet(s) Oral at bedtime  sodium chloride 0.9%. 1000 milliLiter(s) IV Continuous <Continuous>  tamsulosin 0.4 milliGRAM(s) Oral at bedtime  thiamine Injectable 100 milliGRAM(s) IV Push daily    PRN MEDICATIONS  acetaminophen     Tablet .. 650 milliGRAM(s) Oral every 6 hours PRN  albuterol/ipratropium for Nebulization 3 milliLiter(s) Nebulizer every 6 hours PRN  LORazepam   Injectable 0.5 milliGRAM(s) IV Push every 8 hours PRN  magnesium hydroxide Suspension 30 milliLiter(s) Oral daily PRN  melatonin 3 milliGRAM(s) Oral at bedtime PRN  ondansetron Injectable 4 milliGRAM(s) IV Push every 8 hours PRN      VITALS:   T(C): 36.4 (06-23-25 @ 11:30), Max: 36.7 (06-22-25 @ 20:10)  HR: 81 (06-23-25 @ 11:30) (80 - 107)  BP: 133/65 (06-23-25 @ 11:30) (130/64 - 142/63)  RR: 20 (06-23-25 @ 11:30) (20 - 20)  SpO2: 96% (06-23-25 @ 11:30) (96% - 99%)  I&O's Summary    22 Jun 2025 07:01  -  23 Jun 2025 07:00  --------------------------------------------------------  IN: 750 mL / OUT: 925 mL / NET: -175 mL    23 Jun 2025 07:01  -  23 Jun 2025 15:42  --------------------------------------------------------  IN: 640 mL / OUT: 350 mL / NET: 290 mL          PHYSICAL EXAM:  GENERAL: well built, well nourished  NERVOUS SYSTEM:  Alert & Oriented X3,  motor and  sensory intact  CHEST/LUNG: B/L labored breathing; No rales, rhonchi, or wheezing  HEART: S1S2 normal, no S3, Regular rate and rhythm; No murmurs heard  ABDOMEN: Nondistended, tender along L side, Bowel sounds present  EXTREMITIES:  2+ Peripheral Pulses, No edema  SKIN: No rashes or lesions    LABS:                        13.1   13.62 )-----------( 195      ( 23 Jun 2025 05:12 )             40.6     06-23    138  |  104  |  39[H]  ----------------------------<  132[H]  4.7   |  21  |  2.1[H]    Ca    8.5      23 Jun 2025 05:12  Phos  4.0     06-23  Mg     2.6     06-23    TPro  6.2  /  Alb  4.1  /  TBili  0.5  /  DBili  x   /  AST  22  /  ALT  15  /  AlkPhos  54  06-23      Urinalysis Basic - ( 23 Jun 2025 05:12 )    Color: x / Appearance: x / SG: x / pH: x  Gluc: 132 mg/dL / Ketone: x  / Bili: x / Urobili: x   Blood: x / Protein: x / Nitrite: x   Leuk Esterase: x / RBC: x / WBC x   Sq Epi: x / Non Sq Epi: x / Bacteria: x        Lactate, Blood: 1.3 mmol/L (06-23-25 @ 11:03)      Culture - Blood (collected 22 Jun 2025 06:09)  Source: Blood None  Preliminary Report (23 Jun 2025 13:02):    No growth at 24 hours    Culture - Blood (collected 21 Jun 2025 05:00)  Source: Blood Blood-Venous  Preliminary Report (23 Jun 2025 10:01):    No growth at 48 Hours

## 2025-06-23 NOTE — PROGRESS NOTE ADULT - SUBJECTIVE AND OBJECTIVE BOX
ANILA HORTON  79y, Male    All available historical data reviewed    OVERNIGHT EVENTS:  no fevers  abd pain decreased  HFNC    ROS:  General: Denies rigors, nightsweats  HEENT: Denies headache, rhinorrhea, sore throat, eye pain  CV: Denies CP, palpitations  PULM: Denies wheezing, hemoptysis  GI: Denies hematemesis, hematochezia, melena  : Denies discharge, hematuria  MSK: Denies arthralgias, myalgias  SKIN: Denies rash, lesions  NEURO:  weakness  PSYCH: Denies depression, anxiety    VITALS:  T(F): 97.6, Max: 98 (06-22-25 @ 20:10)  HR: 81  BP: 133/65  RR: 20Vital Signs Last 24 Hrs  T(C): 36.4 (23 Jun 2025 11:30), Max: 36.7 (22 Jun 2025 20:10)  T(F): 97.6 (23 Jun 2025 11:30), Max: 98 (22 Jun 2025 20:10)  HR: 81 (23 Jun 2025 11:30) (80 - 107)  BP: 133/65 (23 Jun 2025 11:30) (130/64 - 142/63)  BP(mean): 91 (23 Jun 2025 11:30) (80 - 93)  RR: 20 (23 Jun 2025 11:30) (20 - 20)  SpO2: 96% (23 Jun 2025 11:30) (96% - 99%)    Parameters below as of 23 Jun 2025 11:30  Patient On (Oxygen Delivery Method): nasal cannula, high flow        TESTS & MEASUREMENTS:                        13.1   13.62 )-----------( 195      ( 23 Jun 2025 05:12 )             40.6     06-23    138  |  104  |  39[H]  ----------------------------<  132[H]  4.7   |  21  |  2.1[H]    Ca    8.5      23 Jun 2025 05:12  Phos  4.0     06-23  Mg     2.6     06-23    TPro  6.2  /  Alb  4.1  /  TBili  0.5  /  DBili  x   /  AST  22  /  ALT  15  /  AlkPhos  54  06-23    LIVER FUNCTIONS - ( 23 Jun 2025 05:12 )  Alb: 4.1 g/dL / Pro: 6.2 g/dL / ALK PHOS: 54 U/L / ALT: 15 U/L / AST: 22 U/L / GGT: x             Culture - Blood (collected 06-22-25 @ 06:09)  Source: Blood None  Preliminary Report (06-23-25 @ 13:02):    No growth at 24 hours    Culture - Blood (collected 06-21-25 @ 05:00)  Source: Blood Blood-Venous  Preliminary Report (06-23-25 @ 10:01):    No growth at 48 Hours    Culture - Blood (collected 06-20-25 @ 05:31)  Source: Blood None  Preliminary Report (06-23-25 @ 14:01):    No growth at 72 Hours    Culture - Blood (collected 06-19-25 @ 22:29)  Source: Blood Blood-Peripheral  Preliminary Report (06-23-25 @ 03:01):    No growth at 72 Hours    Culture - Blood (collected 06-19-25 @ 07:55)  Source: Blood None  Preliminary Report (06-22-25 @ 17:00):    No growth at 72 Hours    Culture - Blood (collected 06-17-25 @ 20:43)  Source: Blood Blood-Peripheral  Gram Stain (06-19-25 @ 05:52):    Growth in anaerobic bottle: Gram Positive Cocci in Clusters  Final Report (06-21-25 @ 18:00):    Growth in anaerobic bottle: Staphylococcus hominis  Organism: Staphylococcus hominis (06-21-25 @ 18:00)  Organism: Staphylococcus hominis (06-21-25 @ 18:00)      -  Clindamycin: S <=0.25      -  Oxacillin: S <=0.25      -  Gentamicin: S <=4 Should not be used as monotherapy      -  Vancomycin: S <=0.25      -  Tetracycline: S <=4      Method Type: SOLEDAD      -  Penicillin: R >2      -  Rifampin: S <=1 Should not be used as monotherapy      -  Erythromycin: R >4      -  Trimethoprim/Sulfamethoxazole: S <=0.5/9.5    Culture - Blood (collected 06-17-25 @ 20:34)  Source: Blood Blood-Peripheral  Gram Stain (06-19-25 @ 01:42):    Growth in anaerobic bottle: Gram Positive Cocci in Clusters  Final Report (06-19-25 @ 21:53):    Growth in anaerobic bottle: Staphylococcus epidermidis    Isolation of Coagulase negative Staphylococcus from single blood culture    sets may represent    contamination. Contact the Microbiology Department at [phone] if    susceptibility testing is needed.    clinically indicated.    Direct identification is available within approximately 3-5    hours either by Blood Panel Multiplexed PCR or Direct    MALDI-TOF. Details: [email or web address]  Organism: Blood Culture PCR (06-19-25 @ 21:53)  Organism: Blood Culture PCR (06-19-25 @ 21:53)      -  Staphylococcus epidermidis: Detec      Method Type: PCR    Urinalysis with Rflx Culture (collected 06-17-25 @ 09:26)      Urinalysis Basic - ( 23 Jun 2025 05:12 )    Color: x / Appearance: x / SG: x / pH: x  Gluc: 132 mg/dL / Ketone: x  / Bili: x / Urobili: x   Blood: x / Protein: x / Nitrite: x   Leuk Esterase: x / RBC: x / WBC x   Sq Epi: x / Non Sq Epi: x / Bacteria: x          Social History:  Tobacco Use: No  Alcohol Use: No  Drug Use: No    RADIOLOGY & ADDITIONAL TESTS:  Personal review of radiological diagnostics performed  Echo and EKG results noted when applicable.     MEDICATIONS:  acetaminophen     Tablet .. 650 milliGRAM(s) Oral every 6 hours PRN  albuterol    90 MICROgram(s) HFA Inhaler 2 Puff(s) Inhalation two times a day  albuterol/ipratropium for Nebulization 3 milliLiter(s) Nebulizer every 6 hours PRN  amLODIPine   Tablet 10 milliGRAM(s) Oral daily  atorvastatin 40 milliGRAM(s) Oral at bedtime  buPROPion XL (24-Hour) . 150 milliGRAM(s) Oral daily  chlorhexidine 2% Cloths 1 Application(s) Topical <User Schedule>  dicyclomine 10 milliGRAM(s) Oral three times a day before meals  fluPHENAZine 10 milliGRAM(s) Oral daily  fluticasone propionate/ salmeterol 500-50 MICROgram(s) Diskus 1 Dose(s) Inhalation two times a day  folic acid Injectable 1 milliGRAM(s) IV Push daily  heparin   Injectable 5000 Unit(s) SubCutaneous every 8 hours  hydrALAZINE Injectable 10 milliGRAM(s) IV Push three times a day  iohexol 300 mG (iodine)/mL Oral Solution 30 milliLiter(s) Oral once  lactulose Syrup 20 Gram(s) Oral daily  LORazepam   Injectable 0.5 milliGRAM(s) IV Push every 8 hours PRN  magnesium hydroxide Suspension 30 milliLiter(s) Oral daily PRN  melatonin 3 milliGRAM(s) Oral at bedtime PRN  memantine 5 milliGRAM(s) Oral at bedtime  metoclopramide Injectable 10 milliGRAM(s) IV Push three times a day  nicotine -  14 mG/24Hr(s) Patch 1 Patch Transdermal daily  OLANZapine 20 milliGRAM(s) Oral at bedtime  ondansetron Injectable 4 milliGRAM(s) IV Push every 8 hours PRN  pantoprazole  Injectable 40 milliGRAM(s) IV Push with breakfast  piperacillin/tazobactam IVPB.. 3.375 Gram(s) IV Intermittent every 8 hours  polyethylene glycol 3350 17 Gram(s) Oral two times a day  senna 2 Tablet(s) Oral at bedtime  sodium chloride 0.9%. 1000 milliLiter(s) IV Continuous <Continuous>  tamsulosin 0.4 milliGRAM(s) Oral at bedtime  thiamine Injectable 100 milliGRAM(s) IV Push daily      ANTIBIOTICS:  piperacillin/tazobactam IVPB.. 3.375 Gram(s) IV Intermittent every 8 hours

## 2025-06-23 NOTE — PROGRESS NOTE ADULT - ASSESSMENT
79-year-old male with past medical history of HTN, MDD, schizophrenia, dementia presents to the ED for left lower quadrant abdominal pain associated shortness of breath and cough. pt states he has been having this sudden onset of SOB and cough with deep inspiration. States he is an active smoker. Vitals on admission significant for BP of 200/100 and 98% on 2L NC.   RR called overnight for AHRF now on JFNC, upgraded to SDU.    #Acute hypoxemic resp failure on HHFNC/ NIV  #Aspiration PNA  #COPD exacerbation  #Left side Abd pain 2/2 constipation  #Acute on chronic renal failure  #Lactic acidosis  #HO HTN, MDD, schizophrenia, dementia     - CTA Chest: Negative for pulmonary emboli. Emphysematous changes. Bibasilar atelectasis. Areas of ground glass attenuation which could reflect air trapping/airway disease.  - CT A/P: no acute abdominal findings  - MRSA negative, d/c vanc. C/w zosyn per pulm/CC due to CXR 6/21s showing aspiration event overnight  - c/w solumedrol 40mg IV q12hrs, nebs q6hrs standing  - NIV 4 hrs on/off, alternate with HFNC  - continuing to have abdominal pain, KUB 6/20 showing large fecal burden in ascending colon. per discussion with ID Dr. Steve, recommended CT A/P with PO contrast  - Gen Surg following, no further recs as abdominal pain improved.   - aspiration precautions, HOB 45 degrees. Reglan. Feeding per S/S eval  - bowel regimen, tap water enemas    #MISC  -DVT ppx: heparin  -GI ppx: protonix   -DIET: DASH  -Activity: AAT  -Code Status: Full   -DIspo: SDU    -Pending: CTAP, ID f/u   79-year-old male with past medical history of HTN, MDD, schizophrenia, dementia presents to the ED for left lower quadrant abdominal pain associated shortness of breath and cough. pt states he has been having this sudden onset of SOB and cough with deep inspiration. States he is an active smoker. Vitals on admission significant for BP of 200/100 and 98% on 2L NC.   RR called overnight for AHRF now on JFNC, upgraded to SDU.    #Acute hypoxemic resp failure on HHFNC/ NIV  #Aspiration PNA  #COPD exacerbation  #Left side Abd pain 2/2 constipation  #Acute on chronic renal failure  #Lactic acidosis  #HO HTN, MDD, schizophrenia, dementia   - CTA Chest: Negative for pulmonary emboli. Emphysematous changes. Bibasilar atelectasis. Areas of ground glass attenuation which could reflect air trapping/airway disease.  - MRSA negative, d/c vanc. C/w zosyn per pulm/CC due to CXR 6/21s showing aspiration event overnight  - c/w solumedrol 40mg IV q12hrs, nebs q6hrs standing  - NIV 4 hrs on/off, alternate with HFNC  - continuing to have abdominal pain, KUB 6/20 showing large fecal burden in ascending colon. per discussion with ID Dr. Steve, recommended CT A/P with PO contrast  - Gen Surg following, no further recs as abdominal pain improved.   - aspiration precautions, HOB 45 degrees. Reglan. Feeding per S/S eval  - bowel regimen, tap water enemas  - Get CT AP      #MISC  -DVT ppx: heparin  -GI ppx: protonix   -DIET: DASH  -Activity: AAT  -Code Status: Full   -DIspo: SDU    -Pending: CT AP, c/w abx and f/u with ID, c/w lactulose for constipation

## 2025-06-23 NOTE — PROGRESS NOTE ADULT - ASSESSMENT
Detail Level: Detailed IMPRESSION:    Acute hypoxemic respiratory failure.    COPD exacerbation  Acute on chronic renal failure  Lactic acidosis improved  HO HTN, MDD, schizophrenia, dementia     PLAN:    CNS: Avoid CNS depressant.  CW OP meds    HEENT:  Oral care    PULMONARY:  HOB @ 45 degrees.  Aspiration precaution.  NIV during sleep and PRN during the day.  Wean O2 as tolerated.  Keep POX target 92-96%.  Solumedrol 40mg q24.  Nebs q6 PRN.  CT chest NC      CARDIOVASCULAR: TTE noted nl EF.  BP control.  Equal balance. Lactate improved    GI: GI prophylaxis.  Prokinetic: as needed.  Feeding as tolerated per speech. Surgery eval noted.    RENAL:  F/u  lytes.  Correct as needed.  Strict Is and Os.      INFECTIOUS DISEASE:  Finish Zosyn course.  FU Cx.  MRSA nares negative.    HEMATOLOGICAL:  DVT prophylaxis.  LE doppler negative.    ENDOCRINE:  Follow up FS.  Insulin protocol if needed.    SDU Detail Level: Simple

## 2025-06-23 NOTE — PROGRESS NOTE ADULT - ASSESSMENT
· Assessment	  79-year-old male with past medical history of HTN, MDD, schizophrenia, dementia presents to the ED for left lower quadrant abdominal pain associated shortness of breath and cough. pt states he has been having this sudden onset of SOB and cough with deep inspiration. States he is an active smoker. Denies any fever, chest pain, nausea, vomiting, diarrhea, dysuria, hemoptysis, palpitations, lightheadedness, Sick contacts.     ID consulted for diagnostic work up and antimicrobial treatment of bacteremia  Status of condition: critical     IMPRESSION/RECOMMENDATIONS  Immunosuppression/Immunosenescence ( above age 60 yrs there is a exponential decline in immunity which could result in poor clinical outcomes.  Hypoxic respiratory failure . Bipap   6/17 CT : possibly RLL PE  Peritonitis sresolved  6/22 CXR left blunted : ( Independent interpretation of test : resolving bacterial PNA  NO Left sided PE  No VZV  Possibly diverticulitis with perforation  Lactate 4.2 > more suggestive of GI etiology  WBC 6.0>18.2>13.6  6/17 BCx 2/2 CoNS : not a true pathogen and will no treat  6/18 Nares ORSA NG  6/18 COVID 19/ Influenza/ RSV NG.   6/19 ECHO : no vegetations      < from: CT Angio Chest PE Protocol w/ IV Cont (06.17.25 @ 12:33) >  Negative for pulmonary emboli  Emphysematous changes. Bibasilar atelectasis. Areas of groundglass attenuation which could reflect air trapping/airway disease.  < end of copied text >    < from: US Kidney and Bladder (06.18.25 @ 18:27) >  No hydronephrosis.      HTN  MDD  Schizophrenia  dementia    -Off loading to prevent pressure sores and preventive measures to avoid aspiration  -continue with Zosyn 3.375 gm iv q8h over 3h  -duration/type/po vs iv ABx not clear at this point and will make recommendaitons based on further clinical and microbiological information.     Discussion of management/test results( independently interpretated by me ) /antibiotic regimen  with external/primary medical ICU team. Dr Raymond

## 2025-06-23 NOTE — PROGRESS NOTE ADULT - SUBJECTIVE AND OBJECTIVE BOX
Patient is a 79y old  Male who presents with a chief complaint of SOB (22 Jun 2025 11:35)        Over Night Events:  Remains critically ill on HFNCo2.          ROS:     All ROS are negative except HPI         PHYSICAL EXAM    ICU Vital Signs Last 24 Hrs  T(C): 36.6 (23 Jun 2025 04:00), Max: 36.7 (22 Jun 2025 20:10)  T(F): 97.9 (23 Jun 2025 04:00), Max: 98 (22 Jun 2025 20:10)  HR: 80 (23 Jun 2025 04:00) (80 - 107)  BP: 142/63 (23 Jun 2025 04:00) (132/56 - 149/75)  BP(mean): 91 (23 Jun 2025 04:00) (80 - 94)  ABP: --  ABP(mean): --  RR: 20 (23 Jun 2025 04:00) (20 - 20)  SpO2: 98% (23 Jun 2025 04:00) (94% - 99%)    O2 Parameters below as of 22 Jun 2025 20:30  Patient On (Oxygen Delivery Method): nasal cannula, high flow  O2 Flow (L/min): 50  O2 Concentration (%): 40        CONSTITUTIONAL:  NAD    ENT:   Airway patent,   Mouth with normal mucosa.       EYES:   Pupils equal,   Round and reactive to light.    CARDIAC:   Normal rate,   Regular rhythm.      RESPIRATORY:   No wheezing  Bilateral BS  Normal chest expansion  Not tachypneic,  No use of accessory muscles    GASTROINTESTINAL:  Abdomen soft,   Non-tender,   No guarding,   + BS    MUSCULOSKELETAL:   No clubbing, cyanosis    NEUROLOGICAL:   Alert   No motor  deficits.    SKIN:   Skin normal color for race,   Warm and dry   No evidence of rash.        06-22-25 @ 07:01  -  06-23-25 @ 07:00  --------------------------------------------------------  IN:    IV PiggyBack: 350 mL    Oral Fluid: 400 mL  Total IN: 750 mL    OUT:    Voided (mL): 925 mL  Total OUT: 925 mL    Total NET: -175 mL          LABS:                            13.1   13.62 )-----------( 195      ( 23 Jun 2025 05:12 )             40.6                                               06-23    138  |  104  |  39[H]  ----------------------------<  132[H]  4.7   |  21  |  2.1[H]    Creatinine Trend  BUN 39, Cr 2.1, (06-23-25 @ 05:12)  Creatinine Trend  BUN 43, Cr 2.3, (06-22-25 @ 06:09)  Creatinine Trend  BUN 36, Cr 2.0, (06-21-25 @ 05:00)  Creatinine Trend  BUN 36, Cr 2.2, (06-20-25 @ 11:25)  Creatinine Trend  BUN 30, Cr 2.2, (06-19-25 @ 22:30)  Creatinine Trend  BUN 29, Cr 2.2, (06-19-25 @ 07:55)  Creatinine Trend  BUN 21, Cr 2.1, (06-18-25 @ 08:16)      Ca    8.5      23 Jun 2025 05:12  Phos  4.0     06-23  Mg     2.6     06-23    TPro  6.2  /  Alb  4.1  /  TBili  0.5  /  DBili  x   /  AST  22  /  ALT  15  /  AlkPhos  54  06-23                                             Urinalysis Basic - ( 23 Jun 2025 05:12 )    Color: x / Appearance: x / SG: x / pH: x  Gluc: 132 mg/dL / Ketone: x  / Bili: x / Urobili: x   Blood: x / Protein: x / Nitrite: x   Leuk Esterase: x / RBC: x / WBC x   Sq Epi: x / Non Sq Epi: x / Bacteria: x                                                  LIVER FUNCTIONS - ( 23 Jun 2025 05:12 )  Alb: 4.1 g/dL / Pro: 6.2 g/dL / ALK PHOS: 54 U/L / ALT: 15 U/L / AST: 22 U/L / GGT: x                                                  Culture - Blood (collected 21 Jun 2025 05:00)  Source: Blood Blood-Venous  Preliminary Report (22 Jun 2025 10:01):    No growth at 24 hours                                                                                           MEDICATIONS  (STANDING):  albuterol    90 MICROgram(s) HFA Inhaler 2 Puff(s) Inhalation two times a day  albuterol/ipratropium for Nebulization 3 milliLiter(s) Nebulizer every 6 hours  amLODIPine   Tablet 10 milliGRAM(s) Oral daily  atorvastatin 40 milliGRAM(s) Oral at bedtime  buPROPion XL (24-Hour) . 150 milliGRAM(s) Oral daily  chlorhexidine 2% Cloths 1 Application(s) Topical <User Schedule>  diatrizoate meglumine/diatrizoate sodium. 30 milliLiter(s) Oral once  fluPHENAZine 10 milliGRAM(s) Oral daily  fluticasone propionate/ salmeterol 500-50 MICROgram(s) Diskus 1 Dose(s) Inhalation two times a day  folic acid Injectable 1 milliGRAM(s) IV Push daily  heparin   Injectable 5000 Unit(s) SubCutaneous every 8 hours  hydrALAZINE Injectable 10 milliGRAM(s) IV Push three times a day  lactulose Syrup 20 Gram(s) Oral daily  memantine 5 milliGRAM(s) Oral at bedtime  methylPREDNISolone sodium succinate Injectable 40 milliGRAM(s) IV Push every 12 hours  metoclopramide Injectable 10 milliGRAM(s) IV Push three times a day  nicotine -  14 mG/24Hr(s) Patch 1 Patch Transdermal daily  OLANZapine 20 milliGRAM(s) Oral at bedtime  pantoprazole  Injectable 40 milliGRAM(s) IV Push with breakfast  piperacillin/tazobactam IVPB.. 3.375 Gram(s) IV Intermittent every 8 hours  polyethylene glycol 3350 17 Gram(s) Oral two times a day  senna 2 Tablet(s) Oral at bedtime  thiamine Injectable 100 milliGRAM(s) IV Push daily    MEDICATIONS  (PRN):  acetaminophen     Tablet .. 650 milliGRAM(s) Oral every 6 hours PRN Mild Pain (1 - 3), Moderate Pain (4 - 6)  LORazepam   Injectable 0.5 milliGRAM(s) IV Push every 8 hours PRN Agitation/anxiety  magnesium hydroxide Suspension 30 milliLiter(s) Oral daily PRN Constipation  melatonin 3 milliGRAM(s) Oral at bedtime PRN Insomnia  ondansetron Injectable 4 milliGRAM(s) IV Push every 8 hours PRN Nausea and/or Vomiting      New X-rays reviewed:                                                                                  ECHO

## 2025-06-23 NOTE — PROGRESS NOTE ADULT - SUBJECTIVE AND OBJECTIVE BOX
ANILA HORTON 79y Male  MRN#: [id number]     SUBJECTIVE  79-year-old male with a history of hypertension, major depressive disorder, schizophrenia, and dementia presented to the ED with sudden left lower quadrant abdominal pain, shortness of breath, and cough; suspected COPD/asthma exacerbation secondary to acute bacterial bronchitis although procal has been negative), (noted on CT chest showing emphysematous changes, atelectasis, and ground-glass lesions, ruled out PE), He was treated with increased steroids (IV Solumedrol), nebulizers, inhalers, and antibiotics (Rocephin, azithromycin initially) . He was found to have gram-positive bacteremia prompting an ID consult, treated with  Rocephin and Flagyl, ID noted that blood cultures grew coagulase-negative staphylococci likely not requiring treatment. ID also suspects Acute sharp abd pain on admission and lactate on admission is possibly diverticulitis, with possible perforation, r/o vascular etiology. Therefore recommended Ct scan of the abdomen with PO/IV contrast.   His hypertension, improved on home medications and he had a creatinine of 2.1 from 1.6, suspected KRYSTINA on CKD stage 3 vs progression of CKD, ruled out hydronephrosis, treated with fluids.   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  Subsequently, a rapid response was called for hypoxia (desaturations to the 80s) during the abdominal CT scan to evaluate GI source, he received Solumedrol 125 mg IV, improved with high-flow nasal cannula and then BiPAP, had an elevated lactate, unremarkable ABG and was cleared for transfer to SDU after additional labs and blood cultures were obtained by this provider, with trial of lasix, pt required n continuos BiPAP.   Now pt clinically improved, comfortable on high flow oxygen.     Interval/Overnight Events:    Today is hospital day 6d, and this morning he is lying in bed without distress, c/o left sided upper abdominal pain, overall feels better.   No acute overnight events.     OBJECTIVE  PAST MEDICAL & SURGICAL HISTORY  Schizophrenia    No significant past surgical history      ALLERGIES:  No Known Allergies    HOME MEDICATIONS  Home Medications:  Advair Diskus 250 mcg-50 mcg/inh inhalation powder: 1 puff(s) inhaled 2 times a day (17 Jun 2025 19:02)  Benicar 40 mg oral tablet: 1 tab(s) orally once a day (17 Jun 2025 19:02)  buPROPion 150 mg/24 hours (XL) oral tablet, extended release: 1 tab(s) orally once a day (17 Jun 2025 19:02)  fluPHENAZine 10 mg oral tablet: 1 tab(s) orally once a day (17 Jun 2025 19:02)  folic acid 0.4 mg oral tablet: 1 tab(s) orally once a day (17 Jun 2025 19:02)  lisinopril 20 mg oral tablet: 1 tab(s) orally once a day (17 Jun 2025 19:02)  loratadine 10 mg oral tablet: 1 tab(s) orally once a day (at bedtime) (17 Jun 2025 19:02)  Namenda 5 mg oral tablet: 1 tab(s) orally once a day (at bedtime) (17 Jun 2025 19:02)  Norvasc 5 mg oral tablet: 1 tab(s) orally once a day (17 Jun 2025 19:02)  OLANZapine 20 mg oral tablet: 1 tab(s) orally once a day (at bedtime) (17 Jun 2025 19:02)  omeprazole 40 mg oral delayed release capsule: 1 cap(s) orally once a day (17 Jun 2025 19:02)  thiamine 100 mg oral tablet: 1 tab(s) orally once a day (17 Jun 2025 19:02)    Vital Signs Last 24 Hrs  T(C): 36.4 (23 Jun 2025 11:30), Max: 36.7 (22 Jun 2025 20:10)  T(F): 97.6 (23 Jun 2025 11:30), Max: 98 (22 Jun 2025 20:10)  HR: 81 (23 Jun 2025 11:30) (80 - 107)  BP: 133/65 (23 Jun 2025 11:30) (130/64 - 142/63)  BP(mean): 91 (23 Jun 2025 11:30) (80 - 93)  RR: 20 (23 Jun 2025 11:30) (20 - 20)  SpO2: 96% (23 Jun 2025 11:30) (96% - 99%)    Parameters below as of 23 Jun 2025 11:30  Patient On (Oxygen Delivery Method): nasal cannula, high flow        PHYSICAL EXAM:  GENERAL: NAD  HEAD:  Atraumatic, Normocephalic  EYES: EOMI, PERRLA, conjunctiva and sclera clear  CHEST/LUNG: decreased breath sounds bilaterally  HEART: Regular rate and rhythm  ABDOMEN: left flank tenderness, epigastric tenderness, no rebound, no guarding   EXTREMITIES:  2+ Peripheral Pulses, No edema  SKIN: No rashes or lesions      LABS:                                   13.1   13.62 )-----------( 195      ( 23 Jun 2025 05:12 )             40.6   06-23    138  |  104  |  39[H]  ----------------------------<  132[H]  4.7   |  21  |  2.1[H]    Ca    8.5      23 Jun 2025 05:12  Phos  4.0     06-23  Mg     2.6     06-23    TPro  6.2  /  Alb  4.1  /  TBili  0.5  /  DBili  x   /  AST  22  /  ALT  15  /  AlkPhos  54  06-23      LIVER FUNCTIONS - ( 22 Jun 2025 06:09 )  Alb: 4.2 g/dL / Pro: 6.5 g/dL / ALK PHOS: 60 U/L / ALT: 16 U/L / AST: 29 U/L / GGT: x           PT/INR - ( 21 Jun 2025 05:00 )   PT: 10.70 sec;   INR: 0.91 ratio         PTT - ( 21 Jun 2025 05:00 )  PTT:30.0 sec  Urinalysis Basic - ( 22 Jun 2025 06:09 )    Color: x / Appearance: x / SG: x / pH: x  Gluc: 111 mg/dL / Ketone: x  / Bili: x / Urobili: x   Blood: x / Protein: x / Nitrite: x   Leuk Esterase: x / RBC: x / WBC x   Sq Epi: x / Non Sq Epi: x / Bacteria: x      ABG - ( 20 Jun 2025 13:22 )  pH, Arterial: 7.36  pH, Blood: x     /  pCO2: 35    /  pO2: 116   / HCO3: 20    / Base Excess: -4.9  /  SaO2: 98.2          Culture - Blood (collected 21 Jun 2025 05:00)  Source: Blood Blood-Venous  Preliminary Report (22 Jun 2025 10:01):    No growth at 24 hours    Culture - Blood (collected 20 Jun 2025 05:31)  Source: Blood None  Preliminary Report (21 Jun 2025 14:01):    No growth at 24 hours    Culture - Blood (collected 19 Jun 2025 22:29)  Source: Blood Blood-Peripheral  Preliminary Report (22 Jun 2025 03:02):    No growth at 48 Hours    RADIOLOGY:   < from: Xray Chest 1 View- PORTABLE-Urgent (Xray Chest 1 View- PORTABLE-Urgent .) (06.22.25 @ 10:27) >  IMPRESSION:    Bilateral lower lung zone opacities.    < end of copied text >  < from: [facility] Duplex Lower Ext Vein Scan, Bilat (06.21.25 @ 19:02) >  IMPRESSION:  No evidence of deep venous thrombosis in either lower extremity.    < end of copied text >  < from: CT Angio Chest PE Protocol w/ IV Cont (06.17.25 @ 12:33) >  IMPRESSION:  Negative for pulmonary emboli    Emphysematous changes. Bibasilar atelectasis. Areas of groundglass   attenuation which could reflect air trapping/airway disease.    < end of copied text >  < from: US Kidney and Bladder (06.18.25 @ 18:27) >  IMPRESSION:  No hydronephrosis.    Grossly unchanged from CT scan performed one day prior.    < end of copied text >  < from: Xray Kidney Ureter Bladder (06.20.25 @ 13:48) >  Findings/  impression:    Large colonic fecal burden within the ascending colon. Otherwise   nonspecific bowel gas pattern. Recommend correlation with scheduled CT   abdomen. Additional findings without significant change    < end of copied text >  < from: TTE Echo Complete w/o Contrast w/ Doppler (06.19.25 @ 11:31) >  CONCLUSIONS:     1. Left ventricular wall thickness is normal. Left ventricular systolic   function is normal with an ejection fraction visually estimated at 60 to   65 %. There are no regional wall motion abnormalities seen.   2. Normal left ventricular diastolic function, with normal left   ventricular filling pressure.   3. Normal right ventricular cavity size, with normal wall thickness, and   normal right ventricular systolic function.   4. Normal left and right atrial size.   5. No significant valvular disease.   6. No pericardial effusion seen.    MEDICATIONS  (STANDING):  albuterol    90 MICROgram(s) HFA Inhaler 2 Puff(s) Inhalation two times a day  amLODIPine   Tablet 10 milliGRAM(s) Oral daily  atorvastatin 40 milliGRAM(s) Oral at bedtime  buPROPion XL (24-Hour) . 150 milliGRAM(s) Oral daily  chlorhexidine 2% Cloths 1 Application(s) Topical <User Schedule>  dicyclomine 10 milliGRAM(s) Oral three times a day before meals  fluPHENAZine 10 milliGRAM(s) Oral daily  fluticasone propionate/ salmeterol 500-50 MICROgram(s) Diskus 1 Dose(s) Inhalation two times a day  folic acid Injectable 1 milliGRAM(s) IV Push daily  heparin   Injectable 5000 Unit(s) SubCutaneous every 8 hours  hydrALAZINE Injectable 10 milliGRAM(s) IV Push three times a day  iohexol 300 mG (iodine)/mL Oral Solution 30 milliLiter(s) Oral once  lactulose Syrup 20 Gram(s) Oral daily  memantine 5 milliGRAM(s) Oral at bedtime  metoclopramide Injectable 10 milliGRAM(s) IV Push three times a day  nicotine -  14 mG/24Hr(s) Patch 1 Patch Transdermal daily  OLANZapine 20 milliGRAM(s) Oral at bedtime  pantoprazole  Injectable 40 milliGRAM(s) IV Push with breakfast  piperacillin/tazobactam IVPB.. 3.375 Gram(s) IV Intermittent every 8 hours  polyethylene glycol 3350 17 Gram(s) Oral two times a day  senna 2 Tablet(s) Oral at bedtime  sodium chloride 0.9%. 1000 milliLiter(s) (90 mL/Hr) IV Continuous <Continuous>  tamsulosin 0.4 milliGRAM(s) Oral at bedtime  thiamine Injectable 100 milliGRAM(s) IV Push daily    MEDICATIONS  (PRN):  acetaminophen     Tablet .. 650 milliGRAM(s) Oral every 6 hours PRN Mild Pain (1 - 3), Moderate Pain (4 - 6)  albuterol/ipratropium for Nebulization 3 milliLiter(s) Nebulizer every 6 hours PRN Shortness of Breath and/or Wheezing  LORazepam   Injectable 0.5 milliGRAM(s) IV Push every 8 hours PRN Agitation/anxiety  magnesium hydroxide Suspension 30 milliLiter(s) Oral daily PRN Constipation  melatonin 3 milliGRAM(s) Oral at bedtime PRN Insomnia  ondansetron Injectable 4 milliGRAM(s) IV Push every 8 hours PRN Nausea and/or Vomiting

## 2025-06-24 LAB
ALBUMIN SERPL ELPH-MCNC: 3.9 G/DL — SIGNIFICANT CHANGE UP (ref 3.5–5.2)
ALP SERPL-CCNC: 65 U/L — SIGNIFICANT CHANGE UP (ref 30–115)
ALT FLD-CCNC: 19 U/L — SIGNIFICANT CHANGE UP (ref 0–41)
AMYLASE P1 CFR SERPL: 267 U/L — HIGH (ref 25–115)
ANION GAP SERPL CALC-SCNC: 12 MMOL/L — SIGNIFICANT CHANGE UP (ref 7–14)
AST SERPL-CCNC: 25 U/L — SIGNIFICANT CHANGE UP (ref 0–41)
BASOPHILS # BLD AUTO: 0.06 K/UL — SIGNIFICANT CHANGE UP (ref 0–0.2)
BASOPHILS NFR BLD AUTO: 0.4 % — SIGNIFICANT CHANGE UP (ref 0–1)
BILIRUB SERPL-MCNC: 0.5 MG/DL — SIGNIFICANT CHANGE UP (ref 0.2–1.2)
BUN SERPL-MCNC: 34 MG/DL — HIGH (ref 10–20)
CALCIUM SERPL-MCNC: 8.3 MG/DL — LOW (ref 8.4–10.5)
CHLORIDE SERPL-SCNC: 107 MMOL/L — SIGNIFICANT CHANGE UP (ref 98–110)
CO2 SERPL-SCNC: 20 MMOL/L — SIGNIFICANT CHANGE UP (ref 17–32)
CREAT SERPL-MCNC: 2.3 MG/DL — HIGH (ref 0.7–1.5)
CULTURE RESULTS: SIGNIFICANT CHANGE UP
EGFR: 29 ML/MIN/1.73M2 — LOW
EGFR: 29 ML/MIN/1.73M2 — LOW
EOSINOPHIL # BLD AUTO: 0 K/UL — SIGNIFICANT CHANGE UP (ref 0–0.7)
EOSINOPHIL NFR BLD AUTO: 0 % — SIGNIFICANT CHANGE UP (ref 0–8)
GAS PNL BLDA: SIGNIFICANT CHANGE UP
GLUCOSE SERPL-MCNC: 125 MG/DL — HIGH (ref 70–99)
HCT VFR BLD CALC: 42.4 % — SIGNIFICANT CHANGE UP (ref 42–52)
HGB BLD-MCNC: 13.9 G/DL — LOW (ref 14–18)
IMM GRANULOCYTES NFR BLD AUTO: 2.3 % — HIGH (ref 0.1–0.3)
LIDOCAIN IGE QN: 39 U/L — SIGNIFICANT CHANGE UP (ref 7–60)
LYMPHOCYTES # BLD AUTO: 1.09 K/UL — LOW (ref 1.2–3.4)
LYMPHOCYTES # BLD AUTO: 6.9 % — LOW (ref 20.5–51.1)
MAGNESIUM SERPL-MCNC: 2.3 MG/DL — SIGNIFICANT CHANGE UP (ref 1.8–2.4)
MCHC RBC-ENTMCNC: 30 PG — SIGNIFICANT CHANGE UP (ref 27–31)
MCHC RBC-ENTMCNC: 32.8 G/DL — SIGNIFICANT CHANGE UP (ref 32–37)
MCV RBC AUTO: 91.4 FL — SIGNIFICANT CHANGE UP (ref 80–94)
MONOCYTES # BLD AUTO: 1 K/UL — HIGH (ref 0.1–0.6)
MONOCYTES NFR BLD AUTO: 6.3 % — SIGNIFICANT CHANGE UP (ref 1.7–9.3)
NEUTROPHILS # BLD AUTO: 13.34 K/UL — HIGH (ref 1.4–6.5)
NEUTROPHILS NFR BLD AUTO: 84.1 % — HIGH (ref 42.2–75.2)
NRBC BLD AUTO-RTO: 0 /100 WBCS — SIGNIFICANT CHANGE UP (ref 0–0)
PLATELET # BLD AUTO: 221 K/UL — SIGNIFICANT CHANGE UP (ref 130–400)
PMV BLD: 10.7 FL — HIGH (ref 7.4–10.4)
POTASSIUM SERPL-MCNC: 4.6 MMOL/L — SIGNIFICANT CHANGE UP (ref 3.5–5)
POTASSIUM SERPL-SCNC: 4.6 MMOL/L — SIGNIFICANT CHANGE UP (ref 3.5–5)
PROT SERPL-MCNC: 6.2 G/DL — SIGNIFICANT CHANGE UP (ref 6–8)
RBC # BLD: 4.64 M/UL — LOW (ref 4.7–6.1)
RBC # FLD: 13.7 % — SIGNIFICANT CHANGE UP (ref 11.5–14.5)
SODIUM SERPL-SCNC: 139 MMOL/L — SIGNIFICANT CHANGE UP (ref 135–146)
SPECIMEN SOURCE: SIGNIFICANT CHANGE UP
WBC # BLD: 15.86 K/UL — HIGH (ref 4.8–10.8)
WBC # FLD AUTO: 15.86 K/UL — HIGH (ref 4.8–10.8)

## 2025-06-24 PROCEDURE — 71045 X-RAY EXAM CHEST 1 VIEW: CPT | Mod: 26

## 2025-06-24 PROCEDURE — 74176 CT ABD & PELVIS W/O CONTRAST: CPT | Mod: 26

## 2025-06-24 PROCEDURE — 99291 CRITICAL CARE FIRST HOUR: CPT

## 2025-06-24 PROCEDURE — 71250 CT THORAX DX C-: CPT | Mod: 26

## 2025-06-24 PROCEDURE — 99233 SBSQ HOSP IP/OBS HIGH 50: CPT

## 2025-06-24 RX ORDER — IOHEXOL 350 MG/ML
30 INJECTION, SOLUTION INTRAVENOUS ONCE
Refills: 0 | Status: COMPLETED | OUTPATIENT
Start: 2025-06-24 | End: 2025-06-24

## 2025-06-24 RX ORDER — LIDOCAINE HYDROCHLORIDE 20 MG/ML
1 JELLY TOPICAL DAILY
Refills: 0 | Status: DISCONTINUED | OUTPATIENT
Start: 2025-06-24 | End: 2025-07-01

## 2025-06-24 RX ORDER — CYCLOBENZAPRINE HYDROCHLORIDE 15 MG/1
5 CAPSULE, EXTENDED RELEASE ORAL THREE TIMES A DAY
Refills: 0 | Status: DISCONTINUED | OUTPATIENT
Start: 2025-06-24 | End: 2025-07-01

## 2025-06-24 RX ADMIN — Medication 10 MILLIGRAM(S): at 12:31

## 2025-06-24 RX ADMIN — ATORVASTATIN CALCIUM 40 MILLIGRAM(S): 80 TABLET, FILM COATED ORAL at 22:11

## 2025-06-24 RX ADMIN — IPRATROPIUM BROMIDE AND ALBUTEROL SULFATE 3 MILLILITER(S): .5; 2.5 SOLUTION RESPIRATORY (INHALATION) at 02:54

## 2025-06-24 RX ADMIN — HEPARIN SODIUM 5000 UNIT(S): 1000 INJECTION INTRAVENOUS; SUBCUTANEOUS at 13:17

## 2025-06-24 RX ADMIN — NICOTINE POLACRILEX 1 PATCH: 4 GUM, CHEWING ORAL at 12:33

## 2025-06-24 RX ADMIN — Medication 10 MILLIGRAM(S): at 13:18

## 2025-06-24 RX ADMIN — IOHEXOL 30 MILLILITER(S): 350 INJECTION, SOLUTION INTRAVENOUS at 10:52

## 2025-06-24 RX ADMIN — Medication 10 MILLIGRAM(S): at 12:41

## 2025-06-24 RX ADMIN — Medication 1 APPLICATION(S): at 05:38

## 2025-06-24 RX ADMIN — HEPARIN SODIUM 5000 UNIT(S): 1000 INJECTION INTRAVENOUS; SUBCUTANEOUS at 05:37

## 2025-06-24 RX ADMIN — FOLIC ACID 1 MILLIGRAM(S): 1 TABLET ORAL at 13:17

## 2025-06-24 RX ADMIN — METHYLPREDNISOLONE ACETATE 40 MILLIGRAM(S): 80 INJECTION, SUSPENSION INTRA-ARTICULAR; INTRALESIONAL; INTRAMUSCULAR; SOFT TISSUE at 02:44

## 2025-06-24 RX ADMIN — Medication 10 MILLIGRAM(S): at 05:38

## 2025-06-24 RX ADMIN — Medication 10 MILLIGRAM(S): at 22:08

## 2025-06-24 RX ADMIN — Medication 1 DOSE(S): at 07:57

## 2025-06-24 RX ADMIN — HEPARIN SODIUM 5000 UNIT(S): 1000 INJECTION INTRAVENOUS; SUBCUTANEOUS at 22:09

## 2025-06-24 RX ADMIN — Medication 0.5 MILLIGRAM(S): at 03:21

## 2025-06-24 RX ADMIN — OLANZAPINE 20 MILLIGRAM(S): 10 TABLET ORAL at 22:14

## 2025-06-24 RX ADMIN — Medication 10 MILLIGRAM(S): at 13:17

## 2025-06-24 RX ADMIN — NICOTINE POLACRILEX 1 PATCH: 4 GUM, CHEWING ORAL at 07:45

## 2025-06-24 RX ADMIN — TAMSULOSIN HYDROCHLORIDE 0.4 MILLIGRAM(S): 0.4 CAPSULE ORAL at 22:11

## 2025-06-24 RX ADMIN — Medication 0.5 MILLIGRAM(S): at 13:16

## 2025-06-24 RX ADMIN — Medication 100 MILLIGRAM(S): at 13:16

## 2025-06-24 RX ADMIN — Medication 25 GRAM(S): at 05:39

## 2025-06-24 RX ADMIN — Medication 10 MILLIGRAM(S): at 22:09

## 2025-06-24 RX ADMIN — Medication 25 GRAM(S): at 14:04

## 2025-06-24 RX ADMIN — Medication 10 MILLIGRAM(S): at 05:37

## 2025-06-24 RX ADMIN — AMLODIPINE BESYLATE 10 MILLIGRAM(S): 10 TABLET ORAL at 05:37

## 2025-06-24 RX ADMIN — LIDOCAINE HYDROCHLORIDE 1 PATCH: 20 JELLY TOPICAL at 12:44

## 2025-06-24 RX ADMIN — BUPROPION HYDROBROMIDE 150 MILLIGRAM(S): 522 TABLET, EXTENDED RELEASE ORAL at 12:31

## 2025-06-24 RX ADMIN — MEMANTINE HYDROCHLORIDE 5 MILLIGRAM(S): 21 CAPSULE, EXTENDED RELEASE ORAL at 22:11

## 2025-06-24 RX ADMIN — LIDOCAINE HYDROCHLORIDE 1 PATCH: 20 JELLY TOPICAL at 17:58

## 2025-06-24 RX ADMIN — Medication 10 MILLIGRAM(S): at 17:57

## 2025-06-24 RX ADMIN — NICOTINE POLACRILEX 1 PATCH: 4 GUM, CHEWING ORAL at 17:57

## 2025-06-24 RX ADMIN — Medication 25 GRAM(S): at 22:11

## 2025-06-24 RX ADMIN — Medication 40 MILLIGRAM(S): at 07:54

## 2025-06-24 NOTE — SWALLOW BEDSIDE ASSESSMENT ADULT - SWALLOW EVAL: RECOMMENDED FEEDING/EATING TECHNIQUES
allow for swallow between intakes/maintain upright posture during/after eating for 30 mins/small sips/bites

## 2025-06-24 NOTE — PROGRESS NOTE ADULT - SUBJECTIVE AND OBJECTIVE BOX
ANILA HORTON 79y Male  MRN#: [id number]     SUBJECTIVE  79-year-old male with a history of hypertension, major depressive disorder, schizophrenia, and dementia presented to the ED with sudden left lower quadrant abdominal pain, shortness of breath, and cough; suspected COPD/asthma exacerbation secondary to acute bacterial bronchitis although procal has been negative), (noted on CT chest showing emphysematous changes, atelectasis, and ground-glass lesions, ruled out PE), He was treated with increased steroids (IV Solumedrol), nebulizers, inhalers, and antibiotics (Rocephin, azithromycin initially) . He was found to have gram-positive bacteremia prompting an ID consult, treated with  Rocephin and Flagyl, ID noted that blood cultures grew coagulase-negative staphylococci likely not requiring treatment. ID also suspects Acute sharp abd pain on admission and lactate on admission is possibly diverticulitis, with possible perforation, r/o vascular etiology. Therefore recommended Ct scan of the abdomen with PO/IV contrast.   His hypertension, improved on home medications and he had a creatinine of 2.1 from 1.6, suspected KRYSTINA on CKD stage 3 vs progression of CKD, ruled out hydronephrosis, treated with fluids.   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  Subsequently, a rapid response was called for hypoxia (desaturations to the 80s) during the abdominal CT scan to evaluate GI source, he received Solumedrol 125 mg IV, improved with high-flow nasal cannula and then BiPAP, had an elevated lactate, unremarkable ABG and was cleared for transfer to SDU after additional labs and blood cultures were obtained by this provider, with trial of lasix, pt required n continuos BiPAP.   Now pt clinically improved, comfortable on high flow oxygen.     Interval/Overnight Events:    Today is hospital day 7d, this morning pt in mild distress ,  c/o left sided upper abdominal pain, asking for pain medication.   No acute overnight events.     OBJECTIVE  PAST MEDICAL & SURGICAL HISTORY  Schizophrenia    No significant past surgical history      ALLERGIES:  No Known Allergies    HOME MEDICATIONS  Home Medications:  Advair Diskus 250 mcg-50 mcg/inh inhalation powder: 1 puff(s) inhaled 2 times a day (17 Jun 2025 19:02)  Benicar 40 mg oral tablet: 1 tab(s) orally once a day (17 Jun 2025 19:02)  buPROPion 150 mg/24 hours (XL) oral tablet, extended release: 1 tab(s) orally once a day (17 Jun 2025 19:02)  fluPHENAZine 10 mg oral tablet: 1 tab(s) orally once a day (17 Jun 2025 19:02)  folic acid 0.4 mg oral tablet: 1 tab(s) orally once a day (17 Jun 2025 19:02)  lisinopril 20 mg oral tablet: 1 tab(s) orally once a day (17 Jun 2025 19:02)  loratadine 10 mg oral tablet: 1 tab(s) orally once a day (at bedtime) (17 Jun 2025 19:02)  Namenda 5 mg oral tablet: 1 tab(s) orally once a day (at bedtime) (17 Jun 2025 19:02)  Norvasc 5 mg oral tablet: 1 tab(s) orally once a day (17 Jun 2025 19:02)  OLANZapine 20 mg oral tablet: 1 tab(s) orally once a day (at bedtime) (17 Jun 2025 19:02)  omeprazole 40 mg oral delayed release capsule: 1 cap(s) orally once a day (17 Jun 2025 19:02)  thiamine 100 mg oral tablet: 1 tab(s) orally once a day (17 Jun 2025 19:02)    Vital Signs Last 24 Hrs  T(C): 37.1 (24 Jun 2025 16:00), Max: 37.1 (24 Jun 2025 16:00)  T(F): 98.7 (24 Jun 2025 16:00), Max: 98.7 (24 Jun 2025 16:00)  HR: 83 (24 Jun 2025 16:00) (83 - 111)  BP: 108/59 (24 Jun 2025 16:00) (108/59 - 143/78)  BP(mean): 78 (24 Jun 2025 16:00) (75 - 105)  RR: 21 (24 Jun 2025 16:00) (19 - 21)  SpO2: 99% (24 Jun 2025 16:00) (92% - 100%)    Parameters below as of 24 Jun 2025 16:00  Patient On (Oxygen Delivery Method): nasal cannula, high flow  O2 Flow (L/min): 50  O2 Concentration (%): 60    PHYSICAL EXAM:  GENERAL: NAD  HEAD:  Atraumatic, Normocephalic  EYES: EOMI, PERRLA, conjunctiva and sclera clear  CHEST/LUNG: decreased breath sounds bilaterally  HEART: Regular rate and rhythm  ABDOMEN: left flank tenderness, epigastric tenderness, no rebound, no guarding   EXTREMITIES:  2+ Peripheral Pulses, No edema  SKIN: No rashes or lesions      LABS:                                   13.9   15.86 )-----------( 221      ( 24 Jun 2025 05:30 )             42.4   06-24    139  |  107  |  34[H]  ----------------------------<  125[H]  4.6   |  20  |  2.3[H]    Ca    8.3[L]      24 Jun 2025 05:30  Phos  4.0     06-23  Mg     2.3     06-24    TPro  6.2  /  Alb  3.9  /  TBili  0.5  /  DBili  x   /  AST  25  /  ALT  19  /  AlkPhos  65  06-24        LIVER FUNCTIONS - ( 22 Jun 2025 06:09 )  Alb: 4.2 g/dL / Pro: 6.5 g/dL / ALK PHOS: 60 U/L / ALT: 16 U/L / AST: 29 U/L / GGT: x           PT/INR - ( 21 Jun 2025 05:00 )   PT: 10.70 sec;   INR: 0.91 ratio         PTT - ( 21 Jun 2025 05:00 )  PTT:30.0 sec  Urinalysis Basic - ( 22 Jun 2025 06:09 )    Color: x / Appearance: x / SG: x / pH: x  Gluc: 111 mg/dL / Ketone: x  / Bili: x / Urobili: x   Blood: x / Protein: x / Nitrite: x   Leuk Esterase: x / RBC: x / WBC x   Sq Epi: x / Non Sq Epi: x / Bacteria: x      ABG - ( 20 Jun 2025 13:22 )  pH, Arterial: 7.36  pH, Blood: x     /  pCO2: 35    /  pO2: 116   / HCO3: 20    / Base Excess: -4.9  /  SaO2: 98.2          Culture - Blood (collected 21 Jun 2025 05:00)  Source: Blood Blood-Venous  Preliminary Report (22 Jun 2025 10:01):    No growth at 24 hours    Culture - Blood (collected 20 Jun 2025 05:31)  Source: Blood None  Preliminary Report (21 Jun 2025 14:01):    No growth at 24 hours    Culture - Blood (collected 19 Jun 2025 22:29)  Source: Blood Blood-Peripheral  Preliminary Report (22 Jun 2025 03:02):    No growth at 48 Hours    RADIOLOGY:   < from: Xray Chest 1 View- PORTABLE-Urgent (Xray Chest 1 View- PORTABLE-Urgent .) (06.22.25 @ 10:27) >  IMPRESSION:    Bilateral lower lung zone opacities.    < end of copied text >  < from: [facility] Duplex Lower Ext Vein Scan, Bilat (06.21.25 @ 19:02) >  IMPRESSION:  No evidence of deep venous thrombosis in either lower extremity.    < end of copied text >  < from: CT Angio Chest PE Protocol w/ IV Cont (06.17.25 @ 12:33) >  IMPRESSION:  Negative for pulmonary emboli    Emphysematous changes. Bibasilar atelectasis. Areas of groundglass   attenuation which could reflect air trapping/airway disease.    < end of copied text >  < from: US Kidney and Bladder (06.18.25 @ 18:27) >  IMPRESSION:  No hydronephrosis.    Grossly unchanged from CT scan performed one day prior.    < end of copied text >  < from: Xray Kidney Ureter Bladder (06.20.25 @ 13:48) >  Findings/  impression:    Large colonic fecal burden within the ascending colon. Otherwise   nonspecific bowel gas pattern. Recommend correlation with scheduled CT   abdomen. Additional findings without significant change    < end of copied text >  < from: TTE Echo Complete w/o Contrast w/ Doppler (06.19.25 @ 11:31) >  CONCLUSIONS:     1. Left ventricular wall thickness is normal. Left ventricular systolic   function is normal with an ejection fraction visually estimated at 60 to   65 %. There are no regional wall motion abnormalities seen.   2. Normal left ventricular diastolic function, with normal left   ventricular filling pressure.   3. Normal right ventricular cavity size, with normal wall thickness, and   normal right ventricular systolic function.   4. Normal left and right atrial size.   5. No significant valvular disease.   6. No pericardial effusion seen.    < from: CT Abdomen and Pelvis w/ Oral Cont (06.24.25 @ 13:43) >    IMPRESSION:    No significant change from recent CT. No CT evidence of acute abdominal   pathology.        MEDICATIONS  (STANDING):  albuterol    90 MICROgram(s) HFA Inhaler 2 Puff(s) Inhalation two times a day  amLODIPine   Tablet 10 milliGRAM(s) Oral daily  atorvastatin 40 milliGRAM(s) Oral at bedtime  buPROPion XL (24-Hour) . 150 milliGRAM(s) Oral daily  chlorhexidine 2% Cloths 1 Application(s) Topical <User Schedule>  dicyclomine 10 milliGRAM(s) Oral three times a day before meals  fluPHENAZine 10 milliGRAM(s) Oral daily  fluticasone propionate/ salmeterol 500-50 MICROgram(s) Diskus 1 Dose(s) Inhalation two times a day  folic acid Injectable 1 milliGRAM(s) IV Push daily  heparin   Injectable 5000 Unit(s) SubCutaneous every 8 hours  hydrALAZINE Injectable 10 milliGRAM(s) IV Push three times a day  lactulose Syrup 20 Gram(s) Oral daily  lidocaine   4% Patch 1 Patch Transdermal daily  memantine 5 milliGRAM(s) Oral at bedtime  methylPREDNISolone sodium succinate Injectable 40 milliGRAM(s) IV Push every 24 hours  metoclopramide Injectable 10 milliGRAM(s) IV Push three times a day  nicotine -  14 mG/24Hr(s) Patch 1 Patch Transdermal daily  OLANZapine 20 milliGRAM(s) Oral at bedtime  pantoprazole  Injectable 40 milliGRAM(s) IV Push with breakfast  piperacillin/tazobactam IVPB.. 3.375 Gram(s) IV Intermittent every 8 hours  polyethylene glycol 3350 17 Gram(s) Oral two times a day  senna 2 Tablet(s) Oral at bedtime  tamsulosin 0.4 milliGRAM(s) Oral at bedtime  thiamine Injectable 100 milliGRAM(s) IV Push daily    MEDICATIONS  (PRN):  albuterol/ipratropium for Nebulization 3 milliLiter(s) Nebulizer every 6 hours PRN Shortness of Breath and/or Wheezing  cyclobenzaprine 5 milliGRAM(s) Oral three times a day PRN Muscle Spasm  LORazepam   Injectable 0.5 milliGRAM(s) IV Push every 8 hours PRN Agitation/anxiety  magnesium hydroxide Suspension 30 milliLiter(s) Oral daily PRN Constipation  melatonin 3 milliGRAM(s) Oral at bedtime PRN Insomnia  ondansetron Injectable 4 milliGRAM(s) IV Push every 8 hours PRN Nausea and/or Vomiting  oxycodone    5 mG/acetaminophen 325 mG 1 Tablet(s) Oral every 6 hours PRN Severe Pain (7 - 10)

## 2025-06-24 NOTE — PROGRESS NOTE ADULT - SUBJECTIVE AND OBJECTIVE BOX
Patient is a 79y old  Male who presents with a chief complaint of SOB (23 Jun 2025 16:31)        Over Night Events:        ROS:     All ROS are negative except HPI         PHYSICAL EXAM    ICU Vital Signs Last 24 Hrs  T(C): 36.2 (24 Jun 2025 04:00), Max: 36.6 (23 Jun 2025 08:05)  T(F): 97.1 (24 Jun 2025 04:00), Max: 97.9 (23 Jun 2025 08:05)  HR: 106 (24 Jun 2025 04:00) (81 - 111)  BP: 134/69 (24 Jun 2025 04:00) (115/55 - 139/70)  BP(mean): 88 (24 Jun 2025 04:00) (75 - 91)  ABP: --  ABP(mean): --  RR: 20 (24 Jun 2025 04:00) (20 - 20)  SpO2: 95% (24 Jun 2025 05:25) (92% - 98%)    O2 Parameters below as of 24 Jun 2025 05:25  Patient On (Oxygen Delivery Method): nasal cannula, high flow  O2 Flow (L/min): 40  O2 Concentration (%): 80        CONSTITUTIONAL:  NAD    ENT:   Airway patent,   Mouth with normal mucosa.     EYES:   Pupils equal,   Round and reactive to light.    CARDIAC:   Normal rate,   Regular rhythm.        RESPIRATORY:   No wheezing  Bilateral BS  Normal chest expansion  Not tachypneic,  No use of accessory muscles    GASTROINTESTINAL:  Abdomen soft,   Non-tender,   No guarding,   + BS    MUSCULOSKELETAL:   No clubbing, cyanosis    NEUROLOGICAL:   Alert and oriented   No motor  deficits.    SKIN:   Skin normal color for race,   Warm and dry  No evidence of rash.          06-23-25 @ 07:01  -  06-24-25 @ 07:00  --------------------------------------------------------  IN:    IV PiggyBack: 100 mL    sodium chloride 0.9%: 1980 mL  Total IN: 2080 mL    OUT:    Voided (mL): 550 mL  Total OUT: 550 mL    Total NET: 1530 mL          LABS:                            13.9   15.86 )-----------( 221      ( 24 Jun 2025 05:30 )             42.4                                               06-24    139  |  107  |  34[H]  ----------------------------<  125[H]  4.6   |  20  |  2.3[H]    Creatinine Trend  BUN 34, Cr 2.3, (06-24-25 @ 05:30)  Creatinine Trend  BUN 39, Cr 2.1, (06-23-25 @ 05:12)  Creatinine Trend  BUN 43, Cr 2.3, (06-22-25 @ 06:09)  Creatinine Trend  BUN 36, Cr 2.0, (06-21-25 @ 05:00)  Creatinine Trend  BUN 36, Cr 2.2, (06-20-25 @ 11:25)  Creatinine Trend  BUN 30, Cr 2.2, (06-19-25 @ 22:30)  Creatinine Trend  BUN 29, Cr 2.2, (06-19-25 @ 07:55)      Ca    8.3[L]      24 Jun 2025 05:30  Phos  4.0     06-23  Mg     2.3     06-24    TPro  6.2  /  Alb  3.9  /  TBili  0.5  /  DBili  x   /  AST  25  /  ALT  19  /  AlkPhos  65  06-24                                             Urinalysis Basic - ( 24 Jun 2025 05:30 )    Color: x / Appearance: x / SG: x / pH: x  Gluc: 125 mg/dL / Ketone: x  / Bili: x / Urobili: x   Blood: x / Protein: x / Nitrite: x   Leuk Esterase: x / RBC: x / WBC x   Sq Epi: x / Non Sq Epi: x / Bacteria: x                                                  LIVER FUNCTIONS - ( 24 Jun 2025 05:30 )  Alb: 3.9 g/dL / Pro: 6.2 g/dL / ALK PHOS: 65 U/L / ALT: 19 U/L / AST: 25 U/L / GGT: x                                                  Culture - Blood (collected 22 Jun 2025 06:09)  Source: Blood None  Preliminary Report (23 Jun 2025 13:02):    No growth at 24 hours                                                                                           MEDICATIONS  (STANDING):  albuterol    90 MICROgram(s) HFA Inhaler 2 Puff(s) Inhalation two times a day  amLODIPine   Tablet 10 milliGRAM(s) Oral daily  atorvastatin 40 milliGRAM(s) Oral at bedtime  buPROPion XL (24-Hour) . 150 milliGRAM(s) Oral daily  chlorhexidine 2% Cloths 1 Application(s) Topical <User Schedule>  dicyclomine 10 milliGRAM(s) Oral three times a day before meals  fluPHENAZine 10 milliGRAM(s) Oral daily  fluticasone propionate/ salmeterol 500-50 MICROgram(s) Diskus 1 Dose(s) Inhalation two times a day  folic acid Injectable 1 milliGRAM(s) IV Push daily  heparin   Injectable 5000 Unit(s) SubCutaneous every 8 hours  hydrALAZINE Injectable 10 milliGRAM(s) IV Push three times a day  lactulose Syrup 20 Gram(s) Oral daily  memantine 5 milliGRAM(s) Oral at bedtime  methylPREDNISolone sodium succinate Injectable 40 milliGRAM(s) IV Push every 24 hours  metoclopramide Injectable 10 milliGRAM(s) IV Push three times a day  nicotine -  14 mG/24Hr(s) Patch 1 Patch Transdermal daily  OLANZapine 20 milliGRAM(s) Oral at bedtime  pantoprazole  Injectable 40 milliGRAM(s) IV Push with breakfast  piperacillin/tazobactam IVPB.. 3.375 Gram(s) IV Intermittent every 8 hours  polyethylene glycol 3350 17 Gram(s) Oral two times a day  senna 2 Tablet(s) Oral at bedtime  sodium chloride 0.9%. 1000 milliLiter(s) (90 mL/Hr) IV Continuous <Continuous>  tamsulosin 0.4 milliGRAM(s) Oral at bedtime  thiamine Injectable 100 milliGRAM(s) IV Push daily    MEDICATIONS  (PRN):  acetaminophen     Tablet .. 650 milliGRAM(s) Oral every 6 hours PRN Mild Pain (1 - 3), Moderate Pain (4 - 6)  albuterol/ipratropium for Nebulization 3 milliLiter(s) Nebulizer every 6 hours PRN Shortness of Breath and/or Wheezing  LORazepam   Injectable 0.5 milliGRAM(s) IV Push every 8 hours PRN Agitation/anxiety  magnesium hydroxide Suspension 30 milliLiter(s) Oral daily PRN Constipation  melatonin 3 milliGRAM(s) Oral at bedtime PRN Insomnia  ondansetron Injectable 4 milliGRAM(s) IV Push every 8 hours PRN Nausea and/or Vomiting      New X-rays reviewed:                                                                                  ECHO    CXR interpreted by me:  No acute infiltrates

## 2025-06-24 NOTE — SWALLOW BEDSIDE ASSESSMENT ADULT - COMMENTS
#Acute resp 2/2 suspected COpd/Asthma exacerbation 2/2 Acute bacterial Bronchitis   - CT w biapical pleural thickening w paraseptal emphysematous changes; bibasilar atelectasis; areas of ground-glass attenuation which could reflect air trapping/airway disease  - Increased Prednisone to Solumedrol 60mg BID today then 40mg BID starting tomorrow 06/20/25  - otherwise, nebs and inhalers, along w antitussives and decongestants prn  - outpatient pulm f/u for pft (no h/o copd but CT showing emphysematous  changes)  wean off oxygen  if worsening symptoms consult pulm  - Vanc and azyzthro

## 2025-06-24 NOTE — SWALLOW BEDSIDE ASSESSMENT ADULT - SWALLOW EVAL: FUNCTIONAL LEVEL AT TIME OF EVAL
Awake, alert. On [facility] 40/40. reports feeling that liquids "go down the wrong way" but he is also a poor historian
Awake, alert. On [facility] 40/40. reports feeling that liquids "go down the wrong way" but he is also a poor historian

## 2025-06-24 NOTE — SWALLOW BEDSIDE ASSESSMENT ADULT - SWALLOW EVAL: DIAGNOSIS
+overt s/s aspiration for thins. + toleration observed without overt symptoms of penetration/aspiration for Mildly thick via small sips. +overt s/s aspiration

## 2025-06-24 NOTE — SWALLOW BEDSIDE ASSESSMENT ADULT - SWALLOW EVAL: RECOMMENDED DIET
soft and bite sized foods and mildly thick liquids via small bites and sip consider NPO until respiratory status improves

## 2025-06-24 NOTE — PROGRESS NOTE ADULT - ASSESSMENT
79-year-old male with past medical history of HTN, MDD, schizophrenia, dementia presents to the ED for left lower quadrant abdominal pain associated shortness of breath and cough. pt states he has been having this sudden onset of SOB and cough with deep inspiration. States he is an active smoker. Vitals on admission significant for BP of 200/100 and 98% on 2L NC.   RR called overnight for AHRF now on JFNC, upgraded to SDU.      A/P   #Acute hypoxemic resp failure on HHFNC/ NIV/ Aspiration PNA  #COPD exacerbation/ Dysphagia   - pulmonary is following, recommendations noted:   -  HOB @ 45 degrees.  Aspiration precaution  - NIV during sleep and PRN during the day  - taper off high flow oxygen as tolerated,  Keep POX target 92-96%  - c/w  Solumedrol 40mg q24.  Nebs q6 PRN.    - CT chest NC  ( pending official read)   - taper off high flow Oxygen as tolerated   - aspiration precaution, cleared for po diet by speech and swallow, one to one feedings   - treated with IV  Zosyn for aspiration PNA   -   MRSA nares negative.  - prior CT chest noted, no PE  - Nebs Q 6 hours PRN     #Left side Flank  pain / suspect radiculopathy   - nonspecific   - consulted by surgery, CT abd : no acute pathology   -  pancreatic enzymes noted, no evidence of pancreatitis   - c/w  Bentyl 10 mgh TID with meals   - c/w bowel regimen   - ensure daily BMs   - started on Percocet PRN, Lidoderm patch and Flexeril for suspected radiculopathy   - OOB to chair, PT/rehab eval     #KRYSTINA on CKD III   - monitor BUN/cr and urine output   - started on Flomax   - avoid nephrotoxic medications   - kidney US noted   - maintain fluid balance     #HO  MDD, schizophrenia, dementia   - supportive care and current medications   - aspiration and fall precautions    # h/o HTN / HLD  - on Hydralazine Amlodipine   - c/w statin     DVT ppx: heparin subQ  Full Code    #Progress Note Handoff  Pending (specify):  taper off high flow oxygen as tolerated, chest PT, aspiration precautions, one to one feedings,  started on Percocet PRN, Lidoderm patch and Flexeril for suspected radiculopathy , add Neurontin if pain is not controlled in 24 hours,  consider pain management consult, supportive care, OOB to chair, PT/rehab eval    Family discussion: I spoke with pt   Disposition: SDU

## 2025-06-24 NOTE — SWALLOW BEDSIDE ASSESSMENT ADULT - SLP GENERAL OBSERVATIONS
awake and alert, following commands, voice clear, HFNC 50/50. denies swallowing problems awake and alert, following commands. pt now on HFNC and NRB pt in respiratory distress and is high risk for aspiration at this time

## 2025-06-24 NOTE — PROGRESS NOTE ADULT - SUBJECTIVE AND OBJECTIVE BOX
ANILA HORTON  79y, Male    All available historical data reviewed    OVERNIGHT EVENTS:  none  HFNC  no fevers  left CVA discomfort    ROS:  General: Denies rigors, nightsweats  HEENT: Denies headache, rhinorrhea, sore throat, eye pain  CV: Denies CP, palpitations  PULM: Denies wheezing, hemoptysis  GI: Denies hematemesis, hematochezia, melena  : Denies discharge, hematuria  MSK: Denies arthralgias, myalgias  SKIN: Denies rash, lesions  NEURO: weakness  PSYCH: Denies depression, anxiety    VITALS:  T(F): 98.1, Max: 98.1 (06-24-25 @ 08:47)  HR: 98  BP: 134/56  RR: 20Vital Signs Last 24 Hrs  T(C): 36.7 (24 Jun 2025 11:00), Max: 36.7 (24 Jun 2025 08:47)  T(F): 98.1 (24 Jun 2025 11:00), Max: 98.1 (24 Jun 2025 08:47)  HR: 98 (24 Jun 2025 11:00) (85 - 111)  BP: 134/56 (24 Jun 2025 11:00) (115/55 - 143/78)  BP(mean): 75 (24 Jun 2025 11:00) (75 - 105)  RR: 20 (24 Jun 2025 11:00) (19 - 20)  SpO2: 98% (24 Jun 2025 11:00) (92% - 98%)    Parameters below as of 24 Jun 2025 11:00  Patient On (Oxygen Delivery Method): room air        TESTS & MEASUREMENTS:                        13.9   15.86 )-----------( 221      ( 24 Jun 2025 05:30 )             42.4     06-24    139  |  107  |  34[H]  ----------------------------<  125[H]  4.6   |  20  |  2.3[H]    Ca    8.3[L]      24 Jun 2025 05:30  Phos  4.0     06-23  Mg     2.3     06-24    TPro  6.2  /  Alb  3.9  /  TBili  0.5  /  DBili  x   /  AST  25  /  ALT  19  /  AlkPhos  65  06-24    LIVER FUNCTIONS - ( 24 Jun 2025 05:30 )  Alb: 3.9 g/dL / Pro: 6.2 g/dL / ALK PHOS: 65 U/L / ALT: 19 U/L / AST: 25 U/L / GGT: x             Culture - Blood (collected 06-22-25 @ 06:09)  Source: Blood None  Preliminary Report (06-23-25 @ 13:02):    No growth at 24 hours    Culture - Blood (collected 06-21-25 @ 05:00)  Source: Blood Blood-Venous  Preliminary Report (06-24-25 @ 10:00):    No growth at 72 Hours    Culture - Blood (collected 06-20-25 @ 05:31)  Source: Blood None  Preliminary Report (06-23-25 @ 14:01):    No growth at 72 Hours    Culture - Blood (collected 06-19-25 @ 22:29)  Source: Blood Blood-Peripheral  Preliminary Report (06-24-25 @ 03:00):    No growth at 4 days    Culture - Blood (collected 06-19-25 @ 07:55)  Source: Blood None  Preliminary Report (06-23-25 @ 17:01):    No growth at 4 days    Culture - Blood (collected 06-17-25 @ 20:43)  Source: Blood Blood-Peripheral  Gram Stain (06-19-25 @ 05:52):    Growth in anaerobic bottle: Gram Positive Cocci in Clusters  Final Report (06-21-25 @ 18:00):    Growth in anaerobic bottle: Staphylococcus hominis  Organism: Staphylococcus hominis (06-21-25 @ 18:00)  Organism: Staphylococcus hominis (06-21-25 @ 18:00)      -  Clindamycin: S <=0.25      -  Oxacillin: S <=0.25      -  Gentamicin: S <=4 Should not be used as monotherapy      -  Vancomycin: S <=0.25      -  Tetracycline: S <=4      Method Type: SOLEDAD      -  Penicillin: R >2      -  Rifampin: S <=1 Should not be used as monotherapy      -  Erythromycin: R >4      -  Trimethoprim/Sulfamethoxazole: S <=0.5/9.5    Culture - Blood (collected 06-17-25 @ 20:34)  Source: Blood Blood-Peripheral  Gram Stain (06-19-25 @ 01:42):    Growth in anaerobic bottle: Gram Positive Cocci in Clusters  Final Report (06-19-25 @ 21:53):    Growth in anaerobic bottle: Staphylococcus epidermidis    Isolation of Coagulase negative Staphylococcus from single blood culture    sets may represent    contamination. Contact the Microbiology Department at [phone] if    susceptibility testing is needed.    clinically indicated.    Direct identification is available within approximately 3-5    hours either by Blood Panel Multiplexed PCR or Direct    MALDI-TOF. Details: [email or web address]  Organism: Blood Culture PCR (06-19-25 @ 21:53)  Organism: Blood Culture PCR (06-19-25 @ 21:53)      -  Staphylococcus epidermidis: Detec      Method Type: PCR      Urinalysis Basic - ( 24 Jun 2025 05:30 )    Color: x / Appearance: x / SG: x / pH: x  Gluc: 125 mg/dL / Ketone: x  / Bili: x / Urobili: x   Blood: x / Protein: x / Nitrite: x   Leuk Esterase: x / RBC: x / WBC x   Sq Epi: x / Non Sq Epi: x / Bacteria: x          Social History:  Tobacco Use: No  Alcohol Use: No  Drug Use: No    RADIOLOGY & ADDITIONAL TESTS:  Personal review of radiological diagnostics performed  Echo and EKG results noted when applicable.     MEDICATIONS:  albuterol    90 MICROgram(s) HFA Inhaler 2 Puff(s) Inhalation two times a day  albuterol/ipratropium for Nebulization 3 milliLiter(s) Nebulizer every 6 hours PRN  amLODIPine   Tablet 10 milliGRAM(s) Oral daily  atorvastatin 40 milliGRAM(s) Oral at bedtime  buPROPion XL (24-Hour) . 150 milliGRAM(s) Oral daily  chlorhexidine 2% Cloths 1 Application(s) Topical <User Schedule>  cyclobenzaprine 5 milliGRAM(s) Oral three times a day PRN  dicyclomine 10 milliGRAM(s) Oral three times a day before meals  fluPHENAZine 10 milliGRAM(s) Oral daily  fluticasone propionate/ salmeterol 500-50 MICROgram(s) Diskus 1 Dose(s) Inhalation two times a day  folic acid Injectable 1 milliGRAM(s) IV Push daily  heparin   Injectable 5000 Unit(s) SubCutaneous every 8 hours  hydrALAZINE Injectable 10 milliGRAM(s) IV Push three times a day  lactulose Syrup 20 Gram(s) Oral daily  lidocaine   4% Patch 1 Patch Transdermal daily  LORazepam   Injectable 0.5 milliGRAM(s) IV Push every 8 hours PRN  magnesium hydroxide Suspension 30 milliLiter(s) Oral daily PRN  melatonin 3 milliGRAM(s) Oral at bedtime PRN  memantine 5 milliGRAM(s) Oral at bedtime  methylPREDNISolone sodium succinate Injectable 40 milliGRAM(s) IV Push every 24 hours  metoclopramide Injectable 10 milliGRAM(s) IV Push three times a day  nicotine -  14 mG/24Hr(s) Patch 1 Patch Transdermal daily  OLANZapine 20 milliGRAM(s) Oral at bedtime  ondansetron Injectable 4 milliGRAM(s) IV Push every 8 hours PRN  oxycodone    5 mG/acetaminophen 325 mG 1 Tablet(s) Oral every 6 hours PRN  pantoprazole  Injectable 40 milliGRAM(s) IV Push with breakfast  piperacillin/tazobactam IVPB.. 3.375 Gram(s) IV Intermittent every 8 hours  polyethylene glycol 3350 17 Gram(s) Oral two times a day  senna 2 Tablet(s) Oral at bedtime  tamsulosin 0.4 milliGRAM(s) Oral at bedtime  thiamine Injectable 100 milliGRAM(s) IV Push daily      ANTIBIOTICS:  piperacillin/tazobactam IVPB.. 3.375 Gram(s) IV Intermittent every 8 hours

## 2025-06-24 NOTE — SWALLOW BEDSIDE ASSESSMENT ADULT - ASR SWALLOW RECOMMEND DIAG
pt offered FEES but declined. will attempt MBSS when downgraded and off HFNC/FEES
pt offered FEES but declined. will attempt MBSS when downgraded and off HFNC/FEES

## 2025-06-24 NOTE — PROGRESS NOTE ADULT - ASSESSMENT
IMPRESSION:    Acute hypoxemic respiratory failure.    COPD exacerbation  Acute on chronic renal failure  Lactic acidosis improved  HO HTN, MDD, schizophrenia, dementia     PLAN:    CNS: Avoid CNS depressant.  CW OP meds    HEENT:  Oral care    PULMONARY:  HOB @ 45 degrees.  Aspiration precaution.  NIV during sleep and PRN during the day.  Wean O2 as tolerated.  Keep POX target 92-96%.  Solumedrol 40mg q24.  Nebs q6 PRN.  CT chest NC.  CXR noted     CARDIOVASCULAR: TTE noted nl EF.  BP control.  Equal balance. Lactate improved    GI: GI prophylaxis.  Prokinetic: as needed.  Feeding as tolerated per speech. Surgery eval noted.    RENAL:  F/u  lytes.  Correct as needed.  Strict Is and Os.      INFECTIOUS DISEASE:  Finish Zosyn course.  FU Cx.  MRSA nares negative.    HEMATOLOGICAL:  DVT prophylaxis.  LE doppler negative.    ENDOCRINE:  Follow up FS.  Insulin protocol if needed.    SDU

## 2025-06-24 NOTE — PROGRESS NOTE ADULT - TIME BILLING
time spent on review of labs, imaging studies, old records, obtaining history, personally examining patient, counselling and communicating with patient, entering orders for medications/tests/etc, discussions with other health care providers, documentation in electronic health records, independent interpretation of labs, imaging/procedure results and care coordination.    Time spent excludes teaching
time spent on review of labs, imaging studies, old records, obtaining history, personally examining patient, counselling and communicating with patient, entering orders for medications/tests/etc, discussions with other health care providers, documentation in electronic health records, independent interpretation of labs, imaging/procedure results and care coordination.    Time spent excludes teaching
time spent on review of labs, imaging studies, old records, obtaining history, personally examining patient, counselling and communicating with patient, entering orders for medications/tests/etc, discussions with other health care providers, documentation in electronic health records, independent interpretation of labs, imaging/procedure results and care coordination.

## 2025-06-24 NOTE — PROGRESS NOTE ADULT - ASSESSMENT
79-year-old male with past medical history of HTN, MDD, schizophrenia, dementia presents to the ED for left lower quadrant abdominal pain associated shortness of breath and cough. pt states he has been having this sudden onset of SOB and cough with deep inspiration. States he is an active smoker. Vitals on admission significant for BP of 200/100 and 98% on 2L NC.   RR called overnight for AHRF now on JFNC, upgraded to SDU.      A/P   #Acute hypoxemic resp failure on HHFNC/ NIV/ Aspiration PNA  #COPD exacerbation/ Dysphagia   - pulmonary is following, recommendations noted:   -  HOB @ 45 degrees.  Aspiration precaution  - NIV during sleep and PRN during the day.  Wean O2 as tolerated.  Keep POX target 92-96%  - c/w  Solumedrol 40mg q24.  Nebs q6 PRN.    - CT chest NC    - taper off high flow Oxygen as tolerated   - aspiration precaution   - speech and swallow follow up today, if fails pt will require NGT   - Finish Zosyn course.  FU Cx.  MRSA nares negative.  - prior CT chest noted, no PE  - Nebs Q 6 hours PRN     #Left side Abd pain 2/2 constipation/ Lactic acidosis   - nonspecific   - consulted by surgery, CT abd recommended  - check pancreatic enzymes, repeat LA level   - start Bentyl 10 mgh TID with meals   - c/w bowel regimen   - ensure daily BMs   - c/w pain relief regimen    #KRYSTINA on CKD III   - monitor BUN/cr and urine output   - start Flomax   - IV hydration while NPO   - avoid nephrotoxic medications   - kidney US noted     #HO  MDD, schizophrenia, dementia   - supportive care and current medications   - aspiration and fall precautions    # h/o HTN / HLD  - on Hydralazine Amlodipine   - c/w statin        #MISC  -DVT ppx: heparin SQ  -GI ppx: protonix  -DIET: DASH/TLC  -Activity: AAT  -Code Status: Full   -DIspo: SDU    -Pending:    79-year-old male with past medical history of HTN, MDD, schizophrenia, dementia presents to the ED for left lower quadrant abdominal pain associated shortness of breath and cough. pt states he has been having this sudden onset of SOB and cough with deep inspiration. States he is an active smoker. Vitals on admission significant for BP of 200/100 and 98% on 2L NC.   RR called overnight for AHRF now on JFNC, upgraded to SDU.      A/P   #Acute hypoxemic resp failure on HHFNC/ NIV/ Aspiration PNA  #COPD exacerbation/ Dysphagia   - pulmonary is following, recommendations noted:   -  HOB @ 45 degrees.  Aspiration precaution  - NIV during sleep and PRN during the day.  Wean O2 as tolerated.  Keep POX target 92-96%  - c/w  Solumedrol 40mg q24.  Nebs q6 PRN.    - CT chest NC    - taper off high flow Oxygen as tolerated   - aspiration precaution   - speech and swallow follow up today, if fails pt will require NGT   - Finish Zosyn course.  FU Cx.  MRSA nares negative.  - prior CT chest noted, no PE  - Nebs Q 6 hours PRN     #Left side Abd pain 2/2 constipation/ Lactic acidosis   - nonspecific   - consulted by surgery, CT abd recommended  - check pancreatic enzymes, repeat LA level   - start Bentyl 10 mgh TID with meals   - c/w bowel regimen   - ensure daily BMs   - c/w pain relief regimen    #KRYSTINA on CKD III   - monitor BUN/cr and urine output   - start Flomax   - IV hydration while NPO   - avoid nephrotoxic medications   - kidney US noted     #HO  MDD, schizophrenia, dementia   - supportive care and current medications   - aspiration and fall precautions    # h/o HTN / HLD  - on Hydralazine Amlodipine   - c/w statin        #MISC  -DVT ppx: heparin SQ  -GI ppx: protonix  -DIET: DASH/TLC  -Activity: AAT  -Code Status: Full   -DIspo: SDU    -Pending: CT chest and CT abd/pelvis   80 yo male with a history of HTN, schizophrenia, dementia, and MDD presented to the ED due to sudden left-sided flank pain accompanied by SOB and cough that seems to exacerbate the pain. He is a current smoker. He developed AHRF associated with aspiration pneumonia and suspected COPD exacerbation. He is now on HFNC.    #Acute hypoxemic respiratory failure  #Aspiration pneumonia  #COPD exacerbation  #Left flank pain secondary to possible radiculopathy  #Constipation  - CTA Chest 6/17: Negative for pulmonary emboli. Emphysematous changes. Bibasilar atelectasis. Areas of ground glass attenuation which could reflect air trapping/airway disease.  - CT A/P 6/17: no acute abdominal findings  - CXR 6/24: B/L lower lobe opacities  - CT A/P 6/24: No significant change from recent CT. No CT evidence of acute abdominal pathology. B/L basilar consolidation secondary to aspiration.  - MRSA negative, vancomycin was d/c.   - C/w zosyn due to previous aspiration event   - Pulmonary following: suggest aspiration precautions, HOB 45 degrees, c/w solumedrol 40mg IV once daily and nebs q6hrs, wean off HFNC, repeat CXRs  - Lipase wnl  - Feeding per speech/swallow eval  - C/w lactulose, miralax, senna  - C/w pain relief regimen (lidocaine patch, oxycodone, Flexeril)  - PT eval      #Acute on chronic renal failure  #Lactic acidosis  - Elevated Cr at 2.3 and BUN at 34, eGFR 29  - Lactate remains elevated at 2.8, repeat ABGs  - C/w Flomax  - Monitor BMP and correct as needed  - Monitor I&Os      #HTN, schizophrenia, dementia, MDD  - C/w amlodipine, hydralazine, psych meds    #MISC  -DVT ppx: heparin SQ  -GI ppx: protonix  -DIET: DASH/TLC  -Activity: AAT  -Code Status: Full   -DIspo: SDU    -Pending: PT eval, continue pain assessment, monitor BMP, finish Zosyn course, wean HFNC   80 yo male with a history of HTN, schizophrenia, dementia, and MDD presented to the ED due to sudden left-sided flank pain accompanied by SOB and cough that seems to exacerbate the pain. He is a current smoker. He developed AHRF associated with aspiration pneumonia and suspected COPD exacerbation. He is now on HFNC.    #Acute hypoxemic respiratory failure  #Aspiration pneumonia  #COPD exacerbation  #Left flank pain secondary to possible radiculopathy  #Constipation  - CTA Chest 6/17: Negative for pulmonary emboli. Emphysematous changes. Bibasilar atelectasis. Areas of ground glass attenuation which could reflect air trapping/airway disease.  - CT A/P 6/17: no acute abdominal findings  - CXR 6/24: B/L lower lobe opacities  - CT A/P 6/24: No significant change from recent CT. No CT evidence of acute abdominal pathology. B/L basilar consolidation secondary to aspiration.  - Lipase wnl  - MRSA negative, vancomycin was d/c.   - C/w zosyn due to previous aspiration event   - Pulmonary following: suggest aspiration precautions, HOB 45 degrees, c/w solumedrol 40mg IV once daily and nebs q6hrs, wean off HFNC, repeat CXRs  - Chest percussion therapy  - Feeding per speech/swallow eval  - C/w lactulose, miralax, senna  - C/w pain relief regimen (lidocaine patch, oxycodone, Flexeril)  - PT eval      #Acute on chronic renal failure  #Lactic acidosis  - Elevated Cr at 2.3 and BUN at 34, eGFR 29  - Lactate remains elevated at 2.8, repeat ABGs  - C/w Flomax  - Monitor BMP and correct as needed  - Monitor I&Os      #HTN, schizophrenia, dementia, MDD  - C/w amlodipine, hydralazine, psych meds    #MISC  -DVT ppx: heparin SQ  -GI ppx: protonix  -DIET: DASH/TLC  -Activity: AAT  -Code Status: Full   -DIspo: SDU    -Pending: PT eval, chest percussion therapy continue pain assessment, monitor BMP, finish Zosyn course, wean HFNC

## 2025-06-24 NOTE — PROGRESS NOTE ADULT - ASSESSMENT
· Assessment	  79-year-old male with past medical history of HTN, MDD, schizophrenia, dementia presents to the ED for left lower quadrant abdominal pain associated shortness of breath and cough. pt states he has been having this sudden onset of SOB and cough with deep inspiration. States he is an active smoker. Denies any fever, chest pain, nausea, vomiting, diarrhea, dysuria, hemoptysis, palpitations, lightheadedness, Sick contacts.     ID consulted for diagnostic work up and antimicrobial treatment of bacteremia  Status of condition: critical     IMPRESSION/RECOMMENDATIONS  Immunosuppression/Immunosenescence ( above age 60 yrs there is a exponential decline in immunity which could result in poor clinical outcomes.  Hypoxic respiratory failure . Bipap -HFNC  6/17 CT : possibly RLL PE  Peritonitis sresolved  6/24 CXR decreasing b/l opacities  : ( Independent interpretation of test : resolving bacterial PNA  NO Left sided PE  No VZV  WBC 6.0>18.2>13.6>15.8  6/17 BCx 2/2 CoNS : not a true pathogen and will no treat  6/20 BCX NG  6/21 BCx NG  6/22 BCX NG  6/18 Nares ORSA NG  6/18 COVID 19/ Influenza/ RSV NG.   6/19 ECHO : no vegetations    < from: CT Angio Chest PE Protocol w/ IV Cont (06.17.25 @ 12:33) >  Negative for pulmonary emboli  Emphysematous changes. Bibasilar atelectasis. Areas of groundglass attenuation which could reflect air trapping/airway disease.  < end of copied text >    < from: US Kidney and Bladder (06.18.25 @ 18:27) >  No hydronephrosis.      HTN  MDD  Schizophrenia  dementia    -Off loading to prevent pressure sores and preventive measures to avoid aspiration  -continue with Zosyn 3.375 gm iv q8h over 3h  -duration/type/po vs iv ABx not clear at this point and will make recommendaitons based on further clinical and microbiological information.     Discussion of management/test results( independently interpretated by me ) /antibiotic regimen  with external/primary medical ICU team. Dr Raymond

## 2025-06-24 NOTE — SWALLOW BEDSIDE ASSESSMENT ADULT - ASR SWALLOW ASPIRATION MONITOR
change of breathing pattern/position upright (90Y)/cough/gurgly voice/fever/pneumonia/throat clearing/upper respiratory infection
change of breathing pattern/position upright (90Y)/cough/gurgly voice/fever/upper respiratory infection
change of breathing pattern/position upright (90Y)/cough/gurgly voice/fever/pneumonia/throat clearing/upper respiratory infection

## 2025-06-24 NOTE — PROGRESS NOTE ADULT - NS ATTEST RISK PROBLEM GEN_ALL_CORE FT
-I independently reviewed the completed labs ( CBC, CMP, cultures  along with sensitivities ) and imaging (CT/CXR as available with independent interpretation )  -My assessment required my independent history taking  -Time excludes teaching time.  -I independently reviewed and interpretated all prior notes, tests results.  -I discussed my diagnostic/therapeutic recommendations with the primary team housestaff/Attending  -I assisted in the decision regarding continued need for hospitalization / or escalation of care as needed.

## 2025-06-24 NOTE — PROGRESS NOTE ADULT - SUBJECTIVE AND OBJECTIVE BOX
24H events:    Today is hospital day 7d. This morning patient was seen and examined at bedside. He continues to have coughing fits without sputum production and SOB while still feeling pain by his left flank. He was on HFNC 40:80 and had to be put on non-rebreather temporarily due to severity of his coughing and SOB. He was put on lidocaine patch, Flexeril, and oxycodone.  Otherwise no acute or major events overnight.    PAST MEDICAL & SURGICAL HISTORY  Schizophrenia  Dementia  MDD  HTN    No significant past surgical history        SOCIAL HISTORY:  Social History:      ALLERGIES:  No Known Allergies      MEDICATIONS:  STANDING MEDICATIONS  albuterol    90 MICROgram(s) HFA Inhaler 2 Puff(s) Inhalation two times a day  amLODIPine   Tablet 10 milliGRAM(s) Oral daily  atorvastatin 40 milliGRAM(s) Oral at bedtime  buPROPion XL (24-Hour) . 150 milliGRAM(s) Oral daily  chlorhexidine 2% Cloths 1 Application(s) Topical <User Schedule>  dicyclomine 10 milliGRAM(s) Oral three times a day before meals  fluPHENAZine 10 milliGRAM(s) Oral daily  fluticasone propionate/ salmeterol 500-50 MICROgram(s) Diskus 1 Dose(s) Inhalation two times a day  folic acid Injectable 1 milliGRAM(s) IV Push daily  heparin   Injectable 5000 Unit(s) SubCutaneous every 8 hours  hydrALAZINE Injectable 10 milliGRAM(s) IV Push three times a day  lactulose Syrup 20 Gram(s) Oral daily  lidocaine   4% Patch 1 Patch Transdermal daily  memantine 5 milliGRAM(s) Oral at bedtime  methylPREDNISolone sodium succinate Injectable 40 milliGRAM(s) IV Push every 24 hours  metoclopramide Injectable 10 milliGRAM(s) IV Push three times a day  nicotine -  14 mG/24Hr(s) Patch 1 Patch Transdermal daily  OLANZapine 20 milliGRAM(s) Oral at bedtime  pantoprazole  Injectable 40 milliGRAM(s) IV Push with breakfast  piperacillin/tazobactam IVPB.. 3.375 Gram(s) IV Intermittent every 8 hours  polyethylene glycol 3350 17 Gram(s) Oral two times a day  senna 2 Tablet(s) Oral at bedtime  tamsulosin 0.4 milliGRAM(s) Oral at bedtime  thiamine Injectable 100 milliGRAM(s) IV Push daily    PRN MEDICATIONS  albuterol/ipratropium for Nebulization 3 milliLiter(s) Nebulizer every 6 hours PRN  cyclobenzaprine 5 milliGRAM(s) Oral three times a day PRN  LORazepam   Injectable 0.5 milliGRAM(s) IV Push every 8 hours PRN  magnesium hydroxide Suspension 30 milliLiter(s) Oral daily PRN  melatonin 3 milliGRAM(s) Oral at bedtime PRN  ondansetron Injectable 4 milliGRAM(s) IV Push every 8 hours PRN  oxycodone    5 mG/acetaminophen 325 mG 1 Tablet(s) Oral every 6 hours PRN      VITALS:   T(C): 36.7 (06-24-25 @ 08:47), Max: 36.7 (06-24-25 @ 08:47)  HR: 110 (06-24-25 @ 08:47) (81 - 111)  BP: 143/78 (06-24-25 @ 08:47) (115/55 - 143/78)  RR: 19 (06-24-25 @ 08:47) (19 - 20)  SpO2: 93% (06-24-25 @ 08:47) (92% - 98%)  I&O's Summary    23 Jun 2025 07:01  -  24 Jun 2025 07:00  --------------------------------------------------------  IN: 2080 mL / OUT: 550 mL / NET: 1530 mL    24 Jun 2025 07:01  -  24 Jun 2025 10:29  --------------------------------------------------------  IN: 180 mL / OUT: 0 mL / NET: 180 mL          PHYSICAL EXAM:  GENERAL: ill-appearing on HFNC  NERVOUS SYSTEM:  Alert & Oriented X3,  motor and sensory intact  CHEST/LUNG: B/L rhonchi on auscultation; No rales, rhonchi, or wheezing  HEART: S1S2 normal, no S3, Regular rate and rhythm; No murmurs heard  ABDOMEN: Tenderness along left flank region; Nondistended; Bowel sounds present  EXTREMITIES:  2+ Peripheral Pulses, No edema  SKIN: No rashes or lesions    LABS:                        13.9   15.86 )-----------( 221      ( 24 Jun 2025 05:30 )             42.4     06-24    139  |  107  |  34[H]  ----------------------------<  125[H]  4.6   |  20  |  2.3[H]    Ca    8.3[L]      24 Jun 2025 05:30  Phos  4.0     06-23  Mg     2.3     06-24    TPro  6.2  /  Alb  3.9  /  TBili  0.5  /  DBili  x   /  AST  25  /  ALT  19  /  AlkPhos  65  06-24      Urinalysis Basic - ( 24 Jun 2025 05:30 )    Color: x / Appearance: x / SG: x / pH: x  Gluc: 125 mg/dL / Ketone: x  / Bili: x / Urobili: x   Blood: x / Protein: x / Nitrite: x   Leuk Esterase: x / RBC: x / WBC x   Sq Epi: x / Non Sq Epi: x / Bacteria: x      ABG - ( 24 Jun 2025 09:15 )  pH, Arterial: 7.35  pH, Blood: x     /  pCO2: 35    /  pO2: 119   / HCO3: 19    / Base Excess: -5.6  /  SaO2: 98.7              Lactate, Blood: 1.3 mmol/L (06-23-25 @ 11:03)      Culture - Blood (collected 22 Jun 2025 06:09)  Source: Blood None  Preliminary Report (23 Jun 2025 13:02):    No growth at 24 hours               24H events:    Today is hospital day 7d. This morning patient was seen and examined at bedside. He continues to have coughing fits without sputum production and SOB while still feeling pain by his left flank. He was on HFNC 40:80 and had to be put on non-rebreather temporarily due to severity of his coughing and SOB. He was put on lidocaine patch, Flexeril, and oxycodone. CBC was largely unchanged with leukocytosis and recent ABG continues to show elevated lactate (2.8)  Otherwise no acute or major events overnight.    PAST MEDICAL & SURGICAL HISTORY  Schizophrenia  Dementia  MDD  HTN    No significant past surgical history        SOCIAL HISTORY:  Social History:      ALLERGIES:  No Known Allergies      MEDICATIONS:  STANDING MEDICATIONS  albuterol    90 MICROgram(s) HFA Inhaler 2 Puff(s) Inhalation two times a day  amLODIPine   Tablet 10 milliGRAM(s) Oral daily  atorvastatin 40 milliGRAM(s) Oral at bedtime  buPROPion XL (24-Hour) . 150 milliGRAM(s) Oral daily  chlorhexidine 2% Cloths 1 Application(s) Topical <User Schedule>  dicyclomine 10 milliGRAM(s) Oral three times a day before meals  fluPHENAZine 10 milliGRAM(s) Oral daily  fluticasone propionate/ salmeterol 500-50 MICROgram(s) Diskus 1 Dose(s) Inhalation two times a day  folic acid Injectable 1 milliGRAM(s) IV Push daily  heparin   Injectable 5000 Unit(s) SubCutaneous every 8 hours  hydrALAZINE Injectable 10 milliGRAM(s) IV Push three times a day  lactulose Syrup 20 Gram(s) Oral daily  lidocaine   4% Patch 1 Patch Transdermal daily  memantine 5 milliGRAM(s) Oral at bedtime  methylPREDNISolone sodium succinate Injectable 40 milliGRAM(s) IV Push every 24 hours  metoclopramide Injectable 10 milliGRAM(s) IV Push three times a day  nicotine -  14 mG/24Hr(s) Patch 1 Patch Transdermal daily  OLANZapine 20 milliGRAM(s) Oral at bedtime  pantoprazole  Injectable 40 milliGRAM(s) IV Push with breakfast  piperacillin/tazobactam IVPB.. 3.375 Gram(s) IV Intermittent every 8 hours  polyethylene glycol 3350 17 Gram(s) Oral two times a day  senna 2 Tablet(s) Oral at bedtime  tamsulosin 0.4 milliGRAM(s) Oral at bedtime  thiamine Injectable 100 milliGRAM(s) IV Push daily    PRN MEDICATIONS  albuterol/ipratropium for Nebulization 3 milliLiter(s) Nebulizer every 6 hours PRN  cyclobenzaprine 5 milliGRAM(s) Oral three times a day PRN  LORazepam   Injectable 0.5 milliGRAM(s) IV Push every 8 hours PRN  magnesium hydroxide Suspension 30 milliLiter(s) Oral daily PRN  melatonin 3 milliGRAM(s) Oral at bedtime PRN  ondansetron Injectable 4 milliGRAM(s) IV Push every 8 hours PRN  oxycodone    5 mG/acetaminophen 325 mG 1 Tablet(s) Oral every 6 hours PRN      VITALS:   T(C): 36.7 (06-24-25 @ 08:47), Max: 36.7 (06-24-25 @ 08:47)  HR: 110 (06-24-25 @ 08:47) (81 - 111)  BP: 143/78 (06-24-25 @ 08:47) (115/55 - 143/78)  RR: 19 (06-24-25 @ 08:47) (19 - 20)  SpO2: 93% (06-24-25 @ 08:47) (92% - 98%)  I&O's Summary    23 Jun 2025 07:01  -  24 Jun 2025 07:00  --------------------------------------------------------  IN: 2080 mL / OUT: 550 mL / NET: 1530 mL    24 Jun 2025 07:01  -  24 Jun 2025 10:29  --------------------------------------------------------  IN: 180 mL / OUT: 0 mL / NET: 180 mL          PHYSICAL EXAM:  GENERAL: ill-appearing on HFNC  NERVOUS SYSTEM:  Alert & Oriented X3,  motor and sensory intact  CHEST/LUNG: B/L rhonchi on auscultation; No rales, rhonchi, or wheezing  HEART: S1S2 normal, no S3, Regular rate and rhythm; No murmurs heard  ABDOMEN: Tenderness along left flank region; Nondistended; Bowel sounds present  EXTREMITIES:  2+ Peripheral Pulses, No edema  SKIN: No rashes or lesions    LABS:                        13.9   15.86 )-----------( 221      ( 24 Jun 2025 05:30 )             42.4     06-24    139  |  107  |  34[H]  ----------------------------<  125[H]  4.6   |  20  |  2.3[H]    Ca    8.3[L]      24 Jun 2025 05:30  Phos  4.0     06-23  Mg     2.3     06-24    TPro  6.2  /  Alb  3.9  /  TBili  0.5  /  DBili  x   /  AST  25  /  ALT  19  /  AlkPhos  65  06-24      Urinalysis Basic - ( 24 Jun 2025 05:30 )    Color: x / Appearance: x / SG: x / pH: x  Gluc: 125 mg/dL / Ketone: x  / Bili: x / Urobili: x   Blood: x / Protein: x / Nitrite: x   Leuk Esterase: x / RBC: x / WBC x   Sq Epi: x / Non Sq Epi: x / Bacteria: x      ABG - ( 24 Jun 2025 09:15 )  pH, Arterial: 7.35  pH, Blood: x     /  pCO2: 35    /  pO2: 119   / HCO3: 19    / Base Excess: -5.6  /  SaO2: 98.7              Lactate, Blood: 1.3 mmol/L (06-23-25 @ 11:03)      Culture - Blood (collected 22 Jun 2025 06:09)  Source: Blood None  Preliminary Report (23 Jun 2025 13:02):    No growth at 24 hours               24H events:    Today is hospital day 7d. This morning patient was seen and examined at bedside. He continues to have coughing fits without sputum production and SOB while still feeling pain by his left flank. He was on HFNC 40:80 and had to be put on non-rebreather temporarily due to his coughing and SOB. The plan was to take him for CT today but it is unlikely given the severity of his symptoms. He was put on lidocaine patch, Flexeril, and oxycodone. CBC was largely unchanged with leukocytosis and recent ABG continues to show elevated lactate (2.8)  Otherwise no acute or major events overnight.    PAST MEDICAL & SURGICAL HISTORY  Schizophrenia  Dementia  MDD  HTN    No significant past surgical history        SOCIAL HISTORY:  Social History:      ALLERGIES:  No Known Allergies      MEDICATIONS:  STANDING MEDICATIONS  albuterol    90 MICROgram(s) HFA Inhaler 2 Puff(s) Inhalation two times a day  amLODIPine   Tablet 10 milliGRAM(s) Oral daily  atorvastatin 40 milliGRAM(s) Oral at bedtime  buPROPion XL (24-Hour) . 150 milliGRAM(s) Oral daily  chlorhexidine 2% Cloths 1 Application(s) Topical <User Schedule>  dicyclomine 10 milliGRAM(s) Oral three times a day before meals  fluPHENAZine 10 milliGRAM(s) Oral daily  fluticasone propionate/ salmeterol 500-50 MICROgram(s) Diskus 1 Dose(s) Inhalation two times a day  folic acid Injectable 1 milliGRAM(s) IV Push daily  heparin   Injectable 5000 Unit(s) SubCutaneous every 8 hours  hydrALAZINE Injectable 10 milliGRAM(s) IV Push three times a day  lactulose Syrup 20 Gram(s) Oral daily  lidocaine   4% Patch 1 Patch Transdermal daily  memantine 5 milliGRAM(s) Oral at bedtime  methylPREDNISolone sodium succinate Injectable 40 milliGRAM(s) IV Push every 24 hours  metoclopramide Injectable 10 milliGRAM(s) IV Push three times a day  nicotine -  14 mG/24Hr(s) Patch 1 Patch Transdermal daily  OLANZapine 20 milliGRAM(s) Oral at bedtime  pantoprazole  Injectable 40 milliGRAM(s) IV Push with breakfast  piperacillin/tazobactam IVPB.. 3.375 Gram(s) IV Intermittent every 8 hours  polyethylene glycol 3350 17 Gram(s) Oral two times a day  senna 2 Tablet(s) Oral at bedtime  tamsulosin 0.4 milliGRAM(s) Oral at bedtime  thiamine Injectable 100 milliGRAM(s) IV Push daily    PRN MEDICATIONS  albuterol/ipratropium for Nebulization 3 milliLiter(s) Nebulizer every 6 hours PRN  cyclobenzaprine 5 milliGRAM(s) Oral three times a day PRN  LORazepam   Injectable 0.5 milliGRAM(s) IV Push every 8 hours PRN  magnesium hydroxide Suspension 30 milliLiter(s) Oral daily PRN  melatonin 3 milliGRAM(s) Oral at bedtime PRN  ondansetron Injectable 4 milliGRAM(s) IV Push every 8 hours PRN  oxycodone    5 mG/acetaminophen 325 mG 1 Tablet(s) Oral every 6 hours PRN      VITALS:   T(C): 36.7 (06-24-25 @ 08:47), Max: 36.7 (06-24-25 @ 08:47)  HR: 110 (06-24-25 @ 08:47) (81 - 111)  BP: 143/78 (06-24-25 @ 08:47) (115/55 - 143/78)  RR: 19 (06-24-25 @ 08:47) (19 - 20)  SpO2: 93% (06-24-25 @ 08:47) (92% - 98%)  I&O's Summary    23 Jun 2025 07:01  -  24 Jun 2025 07:00  --------------------------------------------------------  IN: 2080 mL / OUT: 550 mL / NET: 1530 mL    24 Jun 2025 07:01  -  24 Jun 2025 10:29  --------------------------------------------------------  IN: 180 mL / OUT: 0 mL / NET: 180 mL          PHYSICAL EXAM:  GENERAL: ill-appearing on HFNC  NERVOUS SYSTEM:  Alert & Oriented X3,  motor and sensory intact  CHEST/LUNG: B/L rhonchi on auscultation; No rales, rhonchi, or wheezing  HEART: S1S2 normal, no S3, Regular rate and rhythm; No murmurs heard  ABDOMEN: Tenderness along left flank region; Nondistended; Bowel sounds present  EXTREMITIES:  2+ Peripheral Pulses, No edema  SKIN: No rashes or lesions    LABS:                        13.9   15.86 )-----------( 221      ( 24 Jun 2025 05:30 )             42.4     06-24    139  |  107  |  34[H]  ----------------------------<  125[H]  4.6   |  20  |  2.3[H]    Ca    8.3[L]      24 Jun 2025 05:30  Phos  4.0     06-23  Mg     2.3     06-24    TPro  6.2  /  Alb  3.9  /  TBili  0.5  /  DBili  x   /  AST  25  /  ALT  19  /  AlkPhos  65  06-24      Urinalysis Basic - ( 24 Jun 2025 05:30 )    Color: x / Appearance: x / SG: x / pH: x  Gluc: 125 mg/dL / Ketone: x  / Bili: x / Urobili: x   Blood: x / Protein: x / Nitrite: x   Leuk Esterase: x / RBC: x / WBC x   Sq Epi: x / Non Sq Epi: x / Bacteria: x      ABG - ( 24 Jun 2025 09:15 )  pH, Arterial: 7.35  pH, Blood: x     /  pCO2: 35    /  pO2: 119   / HCO3: 19    / Base Excess: -5.6  /  SaO2: 98.7              Lactate, Blood: 1.3 mmol/L (06-23-25 @ 11:03)      Culture - Blood (collected 22 Jun 2025 06:09)  Source: Blood None  Preliminary Report (23 Jun 2025 13:02):    No growth at 24 hours               24H events:    Today is hospital day 7d. This morning patient was seen and examined at bedside. He continues to have coughing fits without sputum production and SOB while still feeling pain by his left flank. He was on HFNC 40:80 and had to be put on non-rebreather temporarily due to his coughing and SOB. He was put on lidocaine patch, Flexeril, and oxycodone for pain relief. CBC was largely unchanged with leukocytosis and recent ABG continues to show elevated lactate (2.8).  Otherwise no acute or major events overnight.    PAST MEDICAL & SURGICAL HISTORY  Schizophrenia  Dementia  MDD  HTN    No significant past surgical history        SOCIAL HISTORY:  Social History:      ALLERGIES:  No Known Allergies      MEDICATIONS:  STANDING MEDICATIONS  albuterol    90 MICROgram(s) HFA Inhaler 2 Puff(s) Inhalation two times a day  amLODIPine   Tablet 10 milliGRAM(s) Oral daily  atorvastatin 40 milliGRAM(s) Oral at bedtime  buPROPion XL (24-Hour) . 150 milliGRAM(s) Oral daily  chlorhexidine 2% Cloths 1 Application(s) Topical <User Schedule>  dicyclomine 10 milliGRAM(s) Oral three times a day before meals  fluPHENAZine 10 milliGRAM(s) Oral daily  fluticasone propionate/ salmeterol 500-50 MICROgram(s) Diskus 1 Dose(s) Inhalation two times a day  folic acid Injectable 1 milliGRAM(s) IV Push daily  heparin   Injectable 5000 Unit(s) SubCutaneous every 8 hours  hydrALAZINE Injectable 10 milliGRAM(s) IV Push three times a day  lactulose Syrup 20 Gram(s) Oral daily  lidocaine   4% Patch 1 Patch Transdermal daily  memantine 5 milliGRAM(s) Oral at bedtime  methylPREDNISolone sodium succinate Injectable 40 milliGRAM(s) IV Push every 24 hours  metoclopramide Injectable 10 milliGRAM(s) IV Push three times a day  nicotine -  14 mG/24Hr(s) Patch 1 Patch Transdermal daily  OLANZapine 20 milliGRAM(s) Oral at bedtime  pantoprazole  Injectable 40 milliGRAM(s) IV Push with breakfast  piperacillin/tazobactam IVPB.. 3.375 Gram(s) IV Intermittent every 8 hours  polyethylene glycol 3350 17 Gram(s) Oral two times a day  senna 2 Tablet(s) Oral at bedtime  tamsulosin 0.4 milliGRAM(s) Oral at bedtime  thiamine Injectable 100 milliGRAM(s) IV Push daily    PRN MEDICATIONS  albuterol/ipratropium for Nebulization 3 milliLiter(s) Nebulizer every 6 hours PRN  cyclobenzaprine 5 milliGRAM(s) Oral three times a day PRN  LORazepam   Injectable 0.5 milliGRAM(s) IV Push every 8 hours PRN  magnesium hydroxide Suspension 30 milliLiter(s) Oral daily PRN  melatonin 3 milliGRAM(s) Oral at bedtime PRN  ondansetron Injectable 4 milliGRAM(s) IV Push every 8 hours PRN  oxycodone    5 mG/acetaminophen 325 mG 1 Tablet(s) Oral every 6 hours PRN      VITALS:   T(C): 36.7 (06-24-25 @ 08:47), Max: 36.7 (06-24-25 @ 08:47)  HR: 110 (06-24-25 @ 08:47) (81 - 111)  BP: 143/78 (06-24-25 @ 08:47) (115/55 - 143/78)  RR: 19 (06-24-25 @ 08:47) (19 - 20)  SpO2: 93% (06-24-25 @ 08:47) (92% - 98%)  I&O's Summary    23 Jun 2025 07:01  -  24 Jun 2025 07:00  --------------------------------------------------------  IN: 2080 mL / OUT: 550 mL / NET: 1530 mL    24 Jun 2025 07:01  -  24 Jun 2025 10:29  --------------------------------------------------------  IN: 180 mL / OUT: 0 mL / NET: 180 mL          PHYSICAL EXAM:  GENERAL: ill-appearing on HFNC  NERVOUS SYSTEM:  Alert & Oriented X3,  motor and sensory intact  CHEST/LUNG: B/L rhonchi on auscultation; No rales, rhonchi, or wheezing  HEART: S1S2 normal, no S3, Regular rate and rhythm; No murmurs heard  ABDOMEN: Tenderness along left flank region; Nondistended; Bowel sounds present  EXTREMITIES:  2+ Peripheral Pulses, No edema  SKIN: No rashes or lesions    LABS:                        13.9   15.86 )-----------( 221      ( 24 Jun 2025 05:30 )             42.4     06-24    139  |  107  |  34[H]  ----------------------------<  125[H]  4.6   |  20  |  2.3[H]    Ca    8.3[L]      24 Jun 2025 05:30  Phos  4.0     06-23  Mg     2.3     06-24    TPro  6.2  /  Alb  3.9  /  TBili  0.5  /  DBili  x   /  AST  25  /  ALT  19  /  AlkPhos  65  06-24      Urinalysis Basic - ( 24 Jun 2025 05:30 )    Color: x / Appearance: x / SG: x / pH: x  Gluc: 125 mg/dL / Ketone: x  / Bili: x / Urobili: x   Blood: x / Protein: x / Nitrite: x   Leuk Esterase: x / RBC: x / WBC x   Sq Epi: x / Non Sq Epi: x / Bacteria: x      ABG - ( 24 Jun 2025 09:15 )  pH, Arterial: 7.35  pH, Blood: x     /  pCO2: 35    /  pO2: 119   / HCO3: 19    / Base Excess: -5.6  /  SaO2: 98.7              Lactate, Blood: 1.3 mmol/L (06-23-25 @ 11:03)      Culture - Blood (collected 22 Jun 2025 06:09)  Source: Blood None  Preliminary Report (23 Jun 2025 13:02):    No growth at 24 hours               24H events:    Today is hospital day 7d. This morning patient was seen and examined at bedside. He continues to have coughing fits without sputum production and SOB while still feeling pain by his left flank. He was on HFNC 40:80 and had to be put on non-rebreather temporarily due to his coughing and SOB. He was put on lidocaine patch, Flexeril, and oxycodone for pain relief. CBC was largely unchanged with leukocytosis and recent ABG continues to show elevated lactate (2.8).  Otherwise no acute or major events overnight.    PAST MEDICAL & SURGICAL HISTORY  Schizophrenia  Dementia  MDD  HTN    No significant past surgical history        SOCIAL HISTORY:  Social History:  active smoker      ALLERGIES:  No Known Allergies      MEDICATIONS:  STANDING MEDICATIONS  albuterol    90 MICROgram(s) HFA Inhaler 2 Puff(s) Inhalation two times a day  amLODIPine   Tablet 10 milliGRAM(s) Oral daily  atorvastatin 40 milliGRAM(s) Oral at bedtime  buPROPion XL (24-Hour) . 150 milliGRAM(s) Oral daily  chlorhexidine 2% Cloths 1 Application(s) Topical <User Schedule>  dicyclomine 10 milliGRAM(s) Oral three times a day before meals  fluPHENAZine 10 milliGRAM(s) Oral daily  fluticasone propionate/ salmeterol 500-50 MICROgram(s) Diskus 1 Dose(s) Inhalation two times a day  folic acid Injectable 1 milliGRAM(s) IV Push daily  heparin   Injectable 5000 Unit(s) SubCutaneous every 8 hours  hydrALAZINE Injectable 10 milliGRAM(s) IV Push three times a day  lactulose Syrup 20 Gram(s) Oral daily  lidocaine   4% Patch 1 Patch Transdermal daily  memantine 5 milliGRAM(s) Oral at bedtime  methylPREDNISolone sodium succinate Injectable 40 milliGRAM(s) IV Push every 24 hours  metoclopramide Injectable 10 milliGRAM(s) IV Push three times a day  nicotine -  14 mG/24Hr(s) Patch 1 Patch Transdermal daily  OLANZapine 20 milliGRAM(s) Oral at bedtime  pantoprazole  Injectable 40 milliGRAM(s) IV Push with breakfast  piperacillin/tazobactam IVPB.. 3.375 Gram(s) IV Intermittent every 8 hours  polyethylene glycol 3350 17 Gram(s) Oral two times a day  senna 2 Tablet(s) Oral at bedtime  tamsulosin 0.4 milliGRAM(s) Oral at bedtime  thiamine Injectable 100 milliGRAM(s) IV Push daily    PRN MEDICATIONS  albuterol/ipratropium for Nebulization 3 milliLiter(s) Nebulizer every 6 hours PRN  cyclobenzaprine 5 milliGRAM(s) Oral three times a day PRN  LORazepam   Injectable 0.5 milliGRAM(s) IV Push every 8 hours PRN  magnesium hydroxide Suspension 30 milliLiter(s) Oral daily PRN  melatonin 3 milliGRAM(s) Oral at bedtime PRN  ondansetron Injectable 4 milliGRAM(s) IV Push every 8 hours PRN  oxycodone    5 mG/acetaminophen 325 mG 1 Tablet(s) Oral every 6 hours PRN      VITALS:   T(C): 36.7 (06-24-25 @ 08:47), Max: 36.7 (06-24-25 @ 08:47)  HR: 110 (06-24-25 @ 08:47) (81 - 111)  BP: 143/78 (06-24-25 @ 08:47) (115/55 - 143/78)  RR: 19 (06-24-25 @ 08:47) (19 - 20)  SpO2: 93% (06-24-25 @ 08:47) (92% - 98%)  I&O's Summary    23 Jun 2025 07:01  -  24 Jun 2025 07:00  --------------------------------------------------------  IN: 2080 mL / OUT: 550 mL / NET: 1530 mL    24 Jun 2025 07:01  -  24 Jun 2025 10:29  --------------------------------------------------------  IN: 180 mL / OUT: 0 mL / NET: 180 mL          PHYSICAL EXAM:  GENERAL: ill-appearing on HFNC  NERVOUS SYSTEM:  Alert & Oriented X3,  motor and sensory intact  CHEST/LUNG: B/L rhonchi on auscultation; No rales, rhonchi, or wheezing  HEART: S1S2 normal, no S3, Regular rate and rhythm; No murmurs heard  ABDOMEN: Tenderness along left flank region; Nondistended; Bowel sounds present  EXTREMITIES:  2+ Peripheral Pulses, No edema  SKIN: No rashes or lesions    LABS:                        13.9   15.86 )-----------( 221      ( 24 Jun 2025 05:30 )             42.4     06-24    139  |  107  |  34[H]  ----------------------------<  125[H]  4.6   |  20  |  2.3[H]    Ca    8.3[L]      24 Jun 2025 05:30  Phos  4.0     06-23  Mg     2.3     06-24    TPro  6.2  /  Alb  3.9  /  TBili  0.5  /  DBili  x   /  AST  25  /  ALT  19  /  AlkPhos  65  06-24      Urinalysis Basic - ( 24 Jun 2025 05:30 )    Color: x / Appearance: x / SG: x / pH: x  Gluc: 125 mg/dL / Ketone: x  / Bili: x / Urobili: x   Blood: x / Protein: x / Nitrite: x   Leuk Esterase: x / RBC: x / WBC x   Sq Epi: x / Non Sq Epi: x / Bacteria: x      ABG - ( 24 Jun 2025 09:15 )  pH, Arterial: 7.35  pH, Blood: x     /  pCO2: 35    /  pO2: 119   / HCO3: 19    / Base Excess: -5.6  /  SaO2: 98.7              Lactate, Blood: 1.3 mmol/L (06-23-25 @ 11:03)      Culture - Blood (collected 22 Jun 2025 06:09)  Source: Blood None  Preliminary Report (23 Jun 2025 13:02):    No growth at 24 hours

## 2025-06-25 LAB
ALBUMIN SERPL ELPH-MCNC: 3.7 G/DL — SIGNIFICANT CHANGE UP (ref 3.5–5.2)
ALP SERPL-CCNC: 53 U/L — SIGNIFICANT CHANGE UP (ref 30–115)
ALT FLD-CCNC: 15 U/L — SIGNIFICANT CHANGE UP (ref 0–41)
ANION GAP SERPL CALC-SCNC: 10 MMOL/L — SIGNIFICANT CHANGE UP (ref 7–14)
AST SERPL-CCNC: 20 U/L — SIGNIFICANT CHANGE UP (ref 0–41)
BASOPHILS # BLD AUTO: 0.04 K/UL — SIGNIFICANT CHANGE UP (ref 0–0.2)
BASOPHILS NFR BLD AUTO: 0.3 % — SIGNIFICANT CHANGE UP (ref 0–1)
BILIRUB SERPL-MCNC: 0.5 MG/DL — SIGNIFICANT CHANGE UP (ref 0.2–1.2)
BUN SERPL-MCNC: 27 MG/DL — HIGH (ref 10–20)
CALCIUM SERPL-MCNC: 8.1 MG/DL — LOW (ref 8.4–10.5)
CHLORIDE SERPL-SCNC: 109 MMOL/L — SIGNIFICANT CHANGE UP (ref 98–110)
CO2 SERPL-SCNC: 22 MMOL/L — SIGNIFICANT CHANGE UP (ref 17–32)
CREAT SERPL-MCNC: 2 MG/DL — HIGH (ref 0.7–1.5)
CULTURE RESULTS: SIGNIFICANT CHANGE UP
CULTURE RESULTS: SIGNIFICANT CHANGE UP
EGFR: 34 ML/MIN/1.73M2 — LOW
EGFR: 34 ML/MIN/1.73M2 — LOW
EOSINOPHIL # BLD AUTO: 0.08 K/UL — SIGNIFICANT CHANGE UP (ref 0–0.7)
EOSINOPHIL NFR BLD AUTO: 0.7 % — SIGNIFICANT CHANGE UP (ref 0–8)
GLUCOSE SERPL-MCNC: 97 MG/DL — SIGNIFICANT CHANGE UP (ref 70–99)
HCT VFR BLD CALC: 38.6 % — LOW (ref 42–52)
HGB BLD-MCNC: 12.7 G/DL — LOW (ref 14–18)
IMM GRANULOCYTES NFR BLD AUTO: 2.8 % — HIGH (ref 0.1–0.3)
LYMPHOCYTES # BLD AUTO: 19.8 % — LOW (ref 20.5–51.1)
LYMPHOCYTES # BLD AUTO: 2.33 K/UL — SIGNIFICANT CHANGE UP (ref 1.2–3.4)
MAGNESIUM SERPL-MCNC: 2.3 MG/DL — SIGNIFICANT CHANGE UP (ref 1.8–2.4)
MCHC RBC-ENTMCNC: 30 PG — SIGNIFICANT CHANGE UP (ref 27–31)
MCHC RBC-ENTMCNC: 32.9 G/DL — SIGNIFICANT CHANGE UP (ref 32–37)
MCV RBC AUTO: 91 FL — SIGNIFICANT CHANGE UP (ref 80–94)
MONOCYTES # BLD AUTO: 0.94 K/UL — HIGH (ref 0.1–0.6)
MONOCYTES NFR BLD AUTO: 8 % — SIGNIFICANT CHANGE UP (ref 1.7–9.3)
NEUTROPHILS # BLD AUTO: 8.07 K/UL — HIGH (ref 1.4–6.5)
NEUTROPHILS NFR BLD AUTO: 68.4 % — SIGNIFICANT CHANGE UP (ref 42.2–75.2)
NRBC BLD AUTO-RTO: 0 /100 WBCS — SIGNIFICANT CHANGE UP (ref 0–0)
PLATELET # BLD AUTO: 176 K/UL — SIGNIFICANT CHANGE UP (ref 130–400)
PMV BLD: 10.2 FL — SIGNIFICANT CHANGE UP (ref 7.4–10.4)
POTASSIUM SERPL-MCNC: 3.9 MMOL/L — SIGNIFICANT CHANGE UP (ref 3.5–5)
POTASSIUM SERPL-SCNC: 3.9 MMOL/L — SIGNIFICANT CHANGE UP (ref 3.5–5)
PROT SERPL-MCNC: 5.5 G/DL — LOW (ref 6–8)
RBC # BLD: 4.24 M/UL — LOW (ref 4.7–6.1)
RBC # FLD: 13.5 % — SIGNIFICANT CHANGE UP (ref 11.5–14.5)
SODIUM SERPL-SCNC: 141 MMOL/L — SIGNIFICANT CHANGE UP (ref 135–146)
SPECIMEN SOURCE: SIGNIFICANT CHANGE UP
SPECIMEN SOURCE: SIGNIFICANT CHANGE UP
WBC # BLD: 11.79 K/UL — HIGH (ref 4.8–10.8)
WBC # FLD AUTO: 11.79 K/UL — HIGH (ref 4.8–10.8)

## 2025-06-25 PROCEDURE — 99233 SBSQ HOSP IP/OBS HIGH 50: CPT

## 2025-06-25 PROCEDURE — 71045 X-RAY EXAM CHEST 1 VIEW: CPT | Mod: 26

## 2025-06-25 RX ORDER — METOCLOPRAMIDE HCL 10 MG
10 TABLET ORAL THREE TIMES A DAY
Refills: 0 | Status: DISCONTINUED | OUTPATIENT
Start: 2025-06-25 | End: 2025-06-26

## 2025-06-25 RX ADMIN — FOLIC ACID 1 MILLIGRAM(S): 1 TABLET ORAL at 12:50

## 2025-06-25 RX ADMIN — BUPROPION HYDROBROMIDE 150 MILLIGRAM(S): 522 TABLET, EXTENDED RELEASE ORAL at 12:34

## 2025-06-25 RX ADMIN — OLANZAPINE 20 MILLIGRAM(S): 10 TABLET ORAL at 21:03

## 2025-06-25 RX ADMIN — LIDOCAINE HYDROCHLORIDE 1 PATCH: 20 JELLY TOPICAL at 19:58

## 2025-06-25 RX ADMIN — TAMSULOSIN HYDROCHLORIDE 0.4 MILLIGRAM(S): 0.4 CAPSULE ORAL at 21:02

## 2025-06-25 RX ADMIN — HEPARIN SODIUM 5000 UNIT(S): 1000 INJECTION INTRAVENOUS; SUBCUTANEOUS at 13:26

## 2025-06-25 RX ADMIN — LIDOCAINE HYDROCHLORIDE 1 PATCH: 20 JELLY TOPICAL at 12:34

## 2025-06-25 RX ADMIN — NICOTINE POLACRILEX 1 PATCH: 4 GUM, CHEWING ORAL at 12:34

## 2025-06-25 RX ADMIN — Medication 10 MILLIGRAM(S): at 05:48

## 2025-06-25 RX ADMIN — Medication 10 MILLIGRAM(S): at 21:01

## 2025-06-25 RX ADMIN — Medication 1 DOSE(S): at 20:07

## 2025-06-25 RX ADMIN — LACTULOSE 20 GRAM(S): 10 SOLUTION ORAL at 12:35

## 2025-06-25 RX ADMIN — Medication 10 MILLIGRAM(S): at 17:25

## 2025-06-25 RX ADMIN — Medication 25 GRAM(S): at 05:50

## 2025-06-25 RX ADMIN — HEPARIN SODIUM 5000 UNIT(S): 1000 INJECTION INTRAVENOUS; SUBCUTANEOUS at 21:02

## 2025-06-25 RX ADMIN — Medication 10 MILLIGRAM(S): at 05:49

## 2025-06-25 RX ADMIN — Medication 40 MILLIGRAM(S): at 12:36

## 2025-06-25 RX ADMIN — POLYETHYLENE GLYCOL 3350 17 GRAM(S): 17 POWDER, FOR SOLUTION ORAL at 05:50

## 2025-06-25 RX ADMIN — NICOTINE POLACRILEX 1 PATCH: 4 GUM, CHEWING ORAL at 12:00

## 2025-06-25 RX ADMIN — Medication 25 GRAM(S): at 13:22

## 2025-06-25 RX ADMIN — MEMANTINE HYDROCHLORIDE 5 MILLIGRAM(S): 21 CAPSULE, EXTENDED RELEASE ORAL at 21:03

## 2025-06-25 RX ADMIN — Medication 1 APPLICATION(S): at 05:51

## 2025-06-25 RX ADMIN — LIDOCAINE HYDROCHLORIDE 1 PATCH: 20 JELLY TOPICAL at 23:36

## 2025-06-25 RX ADMIN — NICOTINE POLACRILEX 1 PATCH: 4 GUM, CHEWING ORAL at 20:06

## 2025-06-25 RX ADMIN — Medication 2 TABLET(S): at 21:02

## 2025-06-25 RX ADMIN — Medication 10 MILLIGRAM(S): at 12:33

## 2025-06-25 RX ADMIN — Medication 25 GRAM(S): at 21:01

## 2025-06-25 RX ADMIN — Medication 0.5 MILLIGRAM(S): at 14:14

## 2025-06-25 RX ADMIN — NICOTINE POLACRILEX 1 PATCH: 4 GUM, CHEWING ORAL at 08:00

## 2025-06-25 RX ADMIN — Medication 10 MILLIGRAM(S): at 12:50

## 2025-06-25 RX ADMIN — POLYETHYLENE GLYCOL 3350 17 GRAM(S): 17 POWDER, FOR SOLUTION ORAL at 17:25

## 2025-06-25 RX ADMIN — Medication 100 MILLIGRAM(S): at 12:49

## 2025-06-25 RX ADMIN — HEPARIN SODIUM 5000 UNIT(S): 1000 INJECTION INTRAVENOUS; SUBCUTANEOUS at 05:48

## 2025-06-25 RX ADMIN — Medication 10 MILLIGRAM(S): at 13:24

## 2025-06-25 RX ADMIN — ATORVASTATIN CALCIUM 40 MILLIGRAM(S): 80 TABLET, FILM COATED ORAL at 21:03

## 2025-06-25 RX ADMIN — AMLODIPINE BESYLATE 10 MILLIGRAM(S): 10 TABLET ORAL at 05:51

## 2025-06-25 NOTE — PROGRESS NOTE ADULT - CRITICAL CARE ATTENDING COMMENT
-The patient's injury acutely impairs one or more vital organ systems, and there is a high probability of imminent or life threatening deterioration in the patient's condition. The care of this patient requires complex medical decision making in the field of Infectious Diseases and extensive interpretation of laboratory, microbiological and radiographic data.

## 2025-06-25 NOTE — PROGRESS NOTE ADULT - SUBJECTIVE AND OBJECTIVE BOX
ANILA HORTON  79y, Male    All available historical data reviewed    OVERNIGHT EVENTS:    feels well and has no new complaints  No fevers   HFNC  ROS:  General: Denies rigors, nightsweats  HEENT: Denies headache, rhinorrhea, sore throat, eye pain  CV: Denies CP, palpitations  PULM: Denies wheezing, hemoptysis  GI: Denies hematemesis, hematochezia, melena  : Denies discharge, hematuria  MSK: Denies arthralgias, myalgias  SKIN: Denies rash, lesions  NEURO:  weakness  PSYCH: Denies depression, anxiety    VITALS:  T(F): 97.5, Max: 98.7 (06-24-25 @ 16:00)  HR: 69  BP: 112/61  RR: 18Vital Signs Last 24 Hrs  T(C): 36.4 (25 Jun 2025 07:47), Max: 37.1 (24 Jun 2025 16:00)  T(F): 97.5 (25 Jun 2025 07:47), Max: 98.7 (24 Jun 2025 16:00)  HR: 69 (25 Jun 2025 07:47) (69 - 85)  BP: 112/61 (25 Jun 2025 07:47) (108/59 - 120/55)  BP(mean): 81 (25 Jun 2025 07:47) (77 - 85)  RR: 18 (25 Jun 2025 09:06) (18 - 21)  SpO2: 97% (25 Jun 2025 09:06) (95% - 100%)    Parameters below as of 25 Jun 2025 09:06  Patient On (Oxygen Delivery Method): nasal cannula, high flow  O2 Flow (L/min): 40  O2 Concentration (%): 40    TESTS & MEASUREMENTS:                        12.7   11.79 )-----------( 176      ( 25 Jun 2025 05:26 )             38.6     06-25    141  |  109  |  27[H]  ----------------------------<  97  3.9   |  22  |  2.0[H]    Ca    8.1[L]      25 Jun 2025 05:26  Mg     2.3     06-25    TPro  5.5[L]  /  Alb  3.7  /  TBili  0.5  /  DBili  x   /  AST  20  /  ALT  15  /  AlkPhos  53  06-25    LIVER FUNCTIONS - ( 25 Jun 2025 05:26 )  Alb: 3.7 g/dL / Pro: 5.5 g/dL / ALK PHOS: 53 U/L / ALT: 15 U/L / AST: 20 U/L / GGT: x             Culture - Blood (collected 06-23-25 @ 05:12)  Source: Blood None  Preliminary Report (06-24-25 @ 13:02):    No growth at 24 hours    Culture - Blood (collected 06-22-25 @ 06:09)  Source: Blood None  Preliminary Report (06-24-25 @ 13:01):    No growth at 48 Hours    Culture - Blood (collected 06-21-25 @ 05:00)  Source: Blood Blood-Venous  Preliminary Report (06-25-25 @ 10:01):    No growth at 4 days    Culture - Blood (collected 06-20-25 @ 05:31)  Source: Blood None  Preliminary Report (06-24-25 @ 14:00):    No growth at 4 days    Culture - Blood (collected 06-19-25 @ 22:29)  Source: Blood Blood-Peripheral  Final Report (06-25-25 @ 03:01):    No growth at 5 days    Culture - Blood (collected 06-19-25 @ 07:55)  Source: Blood None  Final Report (06-24-25 @ 17:00):    No growth at 5 days      Urinalysis Basic - ( 25 Jun 2025 05:26 )    Color: x / Appearance: x / SG: x / pH: x  Gluc: 97 mg/dL / Ketone: x  / Bili: x / Urobili: x   Blood: x / Protein: x / Nitrite: x   Leuk Esterase: x / RBC: x / WBC x   Sq Epi: x / Non Sq Epi: x / Bacteria: x          Social History:  Tobacco Use: No  Alcohol Use: No  Drug Use: No    RADIOLOGY & ADDITIONAL TESTS:  Personal review of radiological diagnostics performed  Echo and EKG results noted when applicable.     MEDICATIONS:  albuterol    90 MICROgram(s) HFA Inhaler 2 Puff(s) Inhalation two times a day  albuterol/ipratropium for Nebulization 3 milliLiter(s) Nebulizer every 6 hours PRN  amLODIPine   Tablet 10 milliGRAM(s) Oral daily  atorvastatin 40 milliGRAM(s) Oral at bedtime  buPROPion XL (24-Hour) . 150 milliGRAM(s) Oral daily  chlorhexidine 2% Cloths 1 Application(s) Topical <User Schedule>  cyclobenzaprine 5 milliGRAM(s) Oral three times a day PRN  dicyclomine 10 milliGRAM(s) Oral three times a day before meals  fluPHENAZine 10 milliGRAM(s) Oral daily  fluticasone propionate/ salmeterol 500-50 MICROgram(s) Diskus 1 Dose(s) Inhalation two times a day  folic acid Injectable 1 milliGRAM(s) IV Push daily  heparin   Injectable 5000 Unit(s) SubCutaneous every 8 hours  hydrALAZINE Injectable 10 milliGRAM(s) IV Push three times a day  lactulose Syrup 20 Gram(s) Oral daily  lidocaine   4% Patch 1 Patch Transdermal daily  LORazepam   Injectable 0.5 milliGRAM(s) IV Push every 8 hours PRN  magnesium hydroxide Suspension 30 milliLiter(s) Oral daily PRN  melatonin 3 milliGRAM(s) Oral at bedtime PRN  memantine 5 milliGRAM(s) Oral at bedtime  methylPREDNISolone sodium succinate Injectable 40 milliGRAM(s) IV Push every 24 hours  metoclopramide Injectable 10 milliGRAM(s) IV Push three times a day PRN  nicotine -  14 mG/24Hr(s) Patch 1 Patch Transdermal daily  OLANZapine 20 milliGRAM(s) Oral at bedtime  ondansetron Injectable 4 milliGRAM(s) IV Push every 8 hours PRN  oxycodone    5 mG/acetaminophen 325 mG 1 Tablet(s) Oral every 6 hours PRN  pantoprazole  Injectable 40 milliGRAM(s) IV Push with breakfast  piperacillin/tazobactam IVPB.. 3.375 Gram(s) IV Intermittent every 8 hours  polyethylene glycol 3350 17 Gram(s) Oral two times a day  senna 2 Tablet(s) Oral at bedtime  tamsulosin 0.4 milliGRAM(s) Oral at bedtime  thiamine Injectable 100 milliGRAM(s) IV Push daily      ANTIBIOTICS:  piperacillin/tazobactam IVPB.. 3.375 Gram(s) IV Intermittent every 8 hours

## 2025-06-25 NOTE — SWALLOW FEES ASSESSMENT ADULT - SLP PERTINENT HISTORY OF CURRENT PROBLEM
79-year-old male with past medical history of HTN, MDD, schizophrenia, dementia presents to the ED for left lower quadrant abdominal pain associated shortness of breath and cough. pt states he has been having this sudden onset of SOB and cough with deep inspiration. States he is an active smoker. Vitals on admission significant for BP of 200/100 and 98% on 2L NC.

## 2025-06-25 NOTE — PROGRESS NOTE ADULT - NEUROLOGICAL
normal/cranial nerves II-XII intact/sensation intact
responds to pain/responds to verbal commands

## 2025-06-25 NOTE — PROGRESS NOTE ADULT - SUBJECTIVE AND OBJECTIVE BOX
24H events:    Today is hospital day 8d. This morning patient was seen and examined at bedside, resting in bed on HFNC 50:50. He reports still having lingering left-sided pain on his flank area and SOB and cough, which have improved since yesterday. He has not been desatting with his coughing fits. He notes feeling a little disoriented during the day as well. He denies fever/chills, nausea, vomiting.  He did pull out his midline overnight. Otherwise no acute or major events overnight.     PAST MEDICAL & SURGICAL HISTORY  Schizophrenia, HTN. dementia, MDD    No significant past surgical history        SOCIAL HISTORY:  Social History:      ALLERGIES:  No Known Allergies      MEDICATIONS:  STANDING MEDICATIONS  albuterol    90 MICROgram(s) HFA Inhaler 2 Puff(s) Inhalation two times a day  amLODIPine   Tablet 10 milliGRAM(s) Oral daily  atorvastatin 40 milliGRAM(s) Oral at bedtime  buPROPion XL (24-Hour) . 150 milliGRAM(s) Oral daily  chlorhexidine 2% Cloths 1 Application(s) Topical <User Schedule>  dicyclomine 10 milliGRAM(s) Oral three times a day before meals  fluPHENAZine 10 milliGRAM(s) Oral daily  fluticasone propionate/ salmeterol 500-50 MICROgram(s) Diskus 1 Dose(s) Inhalation two times a day  folic acid Injectable 1 milliGRAM(s) IV Push daily  heparin   Injectable 5000 Unit(s) SubCutaneous every 8 hours  hydrALAZINE Injectable 10 milliGRAM(s) IV Push three times a day  lactulose Syrup 20 Gram(s) Oral daily  lidocaine   4% Patch 1 Patch Transdermal daily  memantine 5 milliGRAM(s) Oral at bedtime  methylPREDNISolone sodium succinate Injectable 40 milliGRAM(s) IV Push every 24 hours  nicotine -  14 mG/24Hr(s) Patch 1 Patch Transdermal daily  OLANZapine 20 milliGRAM(s) Oral at bedtime  pantoprazole  Injectable 40 milliGRAM(s) IV Push with breakfast  piperacillin/tazobactam IVPB.. 3.375 Gram(s) IV Intermittent every 8 hours  polyethylene glycol 3350 17 Gram(s) Oral two times a day  senna 2 Tablet(s) Oral at bedtime  tamsulosin 0.4 milliGRAM(s) Oral at bedtime  thiamine Injectable 100 milliGRAM(s) IV Push daily    PRN MEDICATIONS  albuterol/ipratropium for Nebulization 3 milliLiter(s) Nebulizer every 6 hours PRN  cyclobenzaprine 5 milliGRAM(s) Oral three times a day PRN  LORazepam   Injectable 0.5 milliGRAM(s) IV Push every 8 hours PRN  magnesium hydroxide Suspension 30 milliLiter(s) Oral daily PRN  melatonin 3 milliGRAM(s) Oral at bedtime PRN  metoclopramide Injectable 10 milliGRAM(s) IV Push three times a day PRN  ondansetron Injectable 4 milliGRAM(s) IV Push every 8 hours PRN  oxycodone    5 mG/acetaminophen 325 mG 1 Tablet(s) Oral every 6 hours PRN      VITALS:   T(C): 36 (06-25-25 @ 20:00), Max: 36.4 (06-25-25 @ 07:47)  HR: 87 (06-25-25 @ 20:30) (69 - 96)  BP: 164/77 (06-25-25 @ 20:00) (104/52 - 164/77)  RR: 18 (06-25-25 @ 20:30) (18 - 20)  SpO2: 96% (06-25-25 @ 20:30) (94% - 100%)  I&O's Summary    24 Jun 2025 07:01 - 25 Jun 2025 07:00  --------------------------------------------------------  IN: 180 mL / OUT: 550 mL / NET: -370 mL    25 Jun 2025 07:01  -  25 Jun 2025 21:21  --------------------------------------------------------  IN: 0 mL / OUT: 1000 mL / NET: -1000 mL          PHYSICAL EXAM:  GENERAL: on HFNC, no acute respiratory distress  HEAD:  Atraumatic, Normocephalic  NERVOUS SYSTEM:  Alert & Oriented X3,  motor and  sensory intact  CHEST/LUNG: B/L decreased lung sounds; No rales, rhonchi, or wheezing  HEART: S1S2 normal, no S3, Regular rate and rhythm; No murmurs heard  ABDOMEN: Left flank tenderness; Nondistended; Bowel sounds present  EXTREMITIES:  2+ Peripheral Pulses, No edema  SKIN: No rashes or lesions    LABS:                        12.7   11.79 )-----------( 176      ( 25 Jun 2025 05:26 )             38.6     06-25    141  |  109  |  27[H]  ----------------------------<  97  3.9   |  22  |  2.0[H]    Ca    8.1[L]      25 Jun 2025 05:26  Mg     2.3     06-25    TPro  5.5[L]  /  Alb  3.7  /  TBili  0.5  /  DBili  x   /  AST  20  /  ALT  15  /  AlkPhos  53  06-25      Urinalysis Basic - ( 25 Jun 2025 05:26 )    Color: x / Appearance: x / SG: x / pH: x  Gluc: 97 mg/dL / Ketone: x  / Bili: x / Urobili: x   Blood: x / Protein: x / Nitrite: x   Leuk Esterase: x / RBC: x / WBC x   Sq Epi: x / Non Sq Epi: x / Bacteria: x      ABG - ( 24 Jun 2025 09:15 )  pH, Arterial: 7.35  pH, Blood: x     /  pCO2: 35    /  pO2: 119   / HCO3: 19    / Base Excess: -5.6  /  SaO2: 98.7                  Culture - Blood (collected 23 Jun 2025 05:12)  Source: Blood None  Preliminary Report (25 Jun 2025 13:02):    No growth at 48 Hours

## 2025-06-25 NOTE — PROGRESS NOTE ADULT - MUSCULOSKELETAL
no calf tenderness/no chest wall tenderness/decreased strength
no calf tenderness/no chest wall tenderness/decreased strength
no calf tenderness/decreased strength
no calf tenderness/no chest wall tenderness/decreased strength

## 2025-06-25 NOTE — PROGRESS NOTE ADULT - ASSESSMENT
Patient is not taking Xarelto and I did verify with him as well.     Tish, can you take this off of his problem list?    · Assessment	  79-year-old male with past medical history of HTN, MDD, schizophrenia, dementia presents to the ED for left lower quadrant abdominal pain associated shortness of breath and cough. pt states he has been having this sudden onset of SOB and cough with deep inspiration. States he is an active smoker. Denies any fever, chest pain, nausea, vomiting, diarrhea, dysuria, hemoptysis, palpitations, lightheadedness, Sick contacts.     ID consulted for diagnostic work up and antimicrobial treatment of bacteremia  Status of condition: critical     IMPRESSION/RECOMMENDATIONS  Immunosuppression/Immunosenescence ( above age 60 yrs there is a exponential decline in immunity which could result in poor clinical outcomes.  Hypoxic respiratory failure . Bipap -HFNC  6/17 CT : possibly RLL PE  Left post thoracic pain due to new fracture of the left posterolateral 10th rib and not from peritonitis  < from: CT Chest No Cont (06.24.25 @ 13:43) > ( Independent interpretation of test : PNA   Compared to prior CT chest,  New complete collapse/consolidation of the bilateral lower lobes and partial atelectasis of the right middle lobe.  New fracture of the left posterolateral 10th rib  < end of copied text >    < from: CT Abdomen and Pelvis w/ Oral Cont (06.24.25 @ 13:43) >  No significant change from recent CT. No CT evidence of acute abdominal pathology.  < end of copied text >    6/25 CXR decreasing b/l opacities  : ( Independent interpretation of test : resolving bacterial PNA  NO Left sided PE  No VZV  WBC 6.0>18.2>13.6>15.8>11.7  6/17 BCx 2/2 CoNS : not a true pathogen and will no treat  6/20 BCX NG  6/21 BCx NG  6/22 BCX NG  6/18 Nares ORSA NG  6/18 COVID 19/ Influenza/ RSV NG.   6/19 ECHO : no vegetations    < from: CT Angio Chest PE Protocol w/ IV Cont (06.17.25 @ 12:33) >  Negative for pulmonary emboli  Emphysematous changes. Bibasilar atelectasis. Areas of groundglass attenuation which could reflect air trapping/airway disease.  < end of copied text >    < from: US Kidney and Bladder (06.18.25 @ 18:27) >  No hydronephrosis.      HTN  MDD  Schizophrenia  dementia    -Off loading to prevent pressure sores and preventive measures to avoid aspiration  -continue with Zosyn 3.375 gm iv q8h over 3h ( possibly an additional 48h and then hold : 6/27       Discussion of management/test results( independently interpretated by me ) /antibiotic regimen  with external/primary medical ICU team. Dr Raymond

## 2025-06-25 NOTE — PROGRESS NOTE ADULT - ATTENDING COMMENTS
IMPRESSION:    Acute hypoxemic respiratory failure.    COPD exacerbation  Aspiration PNA w/ recurrent aspiration events  Acute on chronic renal failure  Lactic acidosis improved  HO HTN, MDD, schizophrenia, dementia     Plan as outlined above

## 2025-06-25 NOTE — PROGRESS NOTE ADULT - SUBJECTIVE AND OBJECTIVE BOX
Patient is a 79y old  Male who presents with a chief complaint of SOB (24 Jun 2025 16:45)      Over Night Events:        ROS:     All ROS are negative except HPI         PHYSICAL EXAM    ICU Vital Signs Last 24 Hrs  T(C): 36.2 (25 Jun 2025 05:56), Max: 37.1 (24 Jun 2025 16:00)  T(F): 97.1 (25 Jun 2025 05:56), Max: 98.7 (24 Jun 2025 16:00)  HR: 70 (25 Jun 2025 05:56) (70 - 110)  BP: 117/66 (25 Jun 2025 05:56) (108/59 - 143/78)  BP(mean): 85 (25 Jun 2025 05:56) (75 - 105)  ABP: --  ABP(mean): --  RR: 20 (24 Jun 2025 21:00) (19 - 21)  SpO2: 100% (25 Jun 2025 05:56) (92% - 100%)    O2 Parameters below as of 24 Jun 2025 21:06  Patient On (Oxygen Delivery Method): nasal cannula, high flow  O2 Flow (L/min): 50  O2 Concentration (%): 60        CONSTITUTIONAL:  NAD    ENT:   Airway patent,   Mouth with normal mucosa.   No thrush    EYES:   Pupils equal,   Round and reactive to light.    CARDIAC:   Normal rate,   Regular rhythm.    No edema    RESPIRATORY:   No wheezing  Bilateral BS  Normal chest expansion  Not tachypneic,  No use of accessory muscles    GASTROINTESTINAL:  Abdomen soft,   Non-tender,   No guarding,   + BS    MUSCULOSKELETAL:   Range of motion is not limited,  No clubbing, cyanosis    NEUROLOGICAL:   Alert and oriented   No motor  deficits.    SKIN:   Skin normal color for race,   Warm and dry and intact.   No evidence of rash.        06-24-25 @ 07:01  -  06-25-25 @ 07:00  --------------------------------------------------------  IN:    sodium chloride 0.9%: 180 mL  Total IN: 180 mL    OUT:    Voided (mL): 550 mL  Total OUT: 550 mL    Total NET: -370 mL          LABS:                            12.7   11.79 )-----------( 176      ( 25 Jun 2025 05:26 )             38.6                                               06-25    141  |  109  |  27[H]  ----------------------------<  97  3.9   |  22  |  2.0[H]    Ca    8.1[L]      25 Jun 2025 05:26  Mg     2.3     06-25    TPro  5.5[L]  /  Alb  3.7  /  TBili  0.5  /  DBili  x   /  AST  20  /  ALT  15  /  AlkPhos  53  06-25                                             Urinalysis Basic - ( 25 Jun 2025 05:26 )    Color: x / Appearance: x / SG: x / pH: x  Gluc: 97 mg/dL / Ketone: x  / Bili: x / Urobili: x   Blood: x / Protein: x / Nitrite: x   Leuk Esterase: x / RBC: x / WBC x   Sq Epi: x / Non Sq Epi: x / Bacteria: x                                                  LIVER FUNCTIONS - ( 25 Jun 2025 05:26 )  Alb: 3.7 g/dL / Pro: 5.5 g/dL / ALK PHOS: 53 U/L / ALT: 15 U/L / AST: 20 U/L / GGT: x                                                  Culture - Blood (collected 23 Jun 2025 05:12)  Source: Blood None  Preliminary Report (24 Jun 2025 13:02):    No growth at 24 hours                                                                                       ABG - ( 24 Jun 2025 09:15 )  pH, Arterial: 7.35  pH, Blood: x     /  pCO2: 35    /  pO2: 119   / HCO3: 19    / Base Excess: -5.6  /  SaO2: 98.7                MEDICATIONS  (STANDING):  albuterol    90 MICROgram(s) HFA Inhaler 2 Puff(s) Inhalation two times a day  amLODIPine   Tablet 10 milliGRAM(s) Oral daily  atorvastatin 40 milliGRAM(s) Oral at bedtime  buPROPion XL (24-Hour) . 150 milliGRAM(s) Oral daily  chlorhexidine 2% Cloths 1 Application(s) Topical <User Schedule>  dicyclomine 10 milliGRAM(s) Oral three times a day before meals  fluPHENAZine 10 milliGRAM(s) Oral daily  fluticasone propionate/ salmeterol 500-50 MICROgram(s) Diskus 1 Dose(s) Inhalation two times a day  folic acid Injectable 1 milliGRAM(s) IV Push daily  heparin   Injectable 5000 Unit(s) SubCutaneous every 8 hours  hydrALAZINE Injectable 10 milliGRAM(s) IV Push three times a day  lactulose Syrup 20 Gram(s) Oral daily  lidocaine   4% Patch 1 Patch Transdermal daily  memantine 5 milliGRAM(s) Oral at bedtime  methylPREDNISolone sodium succinate Injectable 40 milliGRAM(s) IV Push every 24 hours  metoclopramide Injectable 10 milliGRAM(s) IV Push three times a day  nicotine -  14 mG/24Hr(s) Patch 1 Patch Transdermal daily  OLANZapine 20 milliGRAM(s) Oral at bedtime  pantoprazole  Injectable 40 milliGRAM(s) IV Push with breakfast  piperacillin/tazobactam IVPB.. 3.375 Gram(s) IV Intermittent every 8 hours  polyethylene glycol 3350 17 Gram(s) Oral two times a day  senna 2 Tablet(s) Oral at bedtime  tamsulosin 0.4 milliGRAM(s) Oral at bedtime  thiamine Injectable 100 milliGRAM(s) IV Push daily    MEDICATIONS  (PRN):  albuterol/ipratropium for Nebulization 3 milliLiter(s) Nebulizer every 6 hours PRN Shortness of Breath and/or Wheezing  cyclobenzaprine 5 milliGRAM(s) Oral three times a day PRN Muscle Spasm  LORazepam   Injectable 0.5 milliGRAM(s) IV Push every 8 hours PRN Agitation/anxiety  magnesium hydroxide Suspension 30 milliLiter(s) Oral daily PRN Constipation  melatonin 3 milliGRAM(s) Oral at bedtime PRN Insomnia  ondansetron Injectable 4 milliGRAM(s) IV Push every 8 hours PRN Nausea and/or Vomiting  oxycodone    5 mG/acetaminophen 325 mG 1 Tablet(s) Oral every 6 hours PRN Severe Pain (7 - 10)      New X-rays reviewed:                                                                                  ECHO    CXR interpreted by me:       Patient is a 79y old  Male who presents with a chief complaint of SOB (24 Jun 2025 16:45)      Over Night Events: On HFNC 50/60.        ROS:     All ROS are negative except HPI         PHYSICAL EXAM    ICU Vital Signs Last 24 Hrs  T(C): 36.2 (25 Jun 2025 05:56), Max: 37.1 (24 Jun 2025 16:00)  T(F): 97.1 (25 Jun 2025 05:56), Max: 98.7 (24 Jun 2025 16:00)  HR: 70 (25 Jun 2025 05:56) (70 - 110)  BP: 117/66 (25 Jun 2025 05:56) (108/59 - 143/78)  BP(mean): 85 (25 Jun 2025 05:56) (75 - 105)  ABP: --  ABP(mean): --  RR: 20 (24 Jun 2025 21:00) (19 - 21)  SpO2: 100% (25 Jun 2025 05:56) (92% - 100%)    O2 Parameters below as of 24 Jun 2025 21:06  Patient On (Oxygen Delivery Method): nasal cannula, high flow  O2 Flow (L/min): 50  O2 Concentration (%): 60        CONSTITUTIONAL:  NAD    ENT:   On HFNC    EYES:   Pupils equal,   Round and reactive to light.    CARDIAC:   Normal rate,   Regular rhythm.    No edema    RESPIRATORY:   Bilateral BS  Normal chest expansion  Not tachypneic,  No use of accessory muscles    GASTROINTESTINAL:  Abdomen soft,   Non-tender    MUSCULOSKELETAL:   Range of motion is not limited,  No clubbing, cyanosis    NEUROLOGICAL:   Alert  No motor  deficits.    SKIN:   Skin normal color for race,   Warm and dry and intact.        06-24-25 @ 07:01  -  06-25-25 @ 07:00  --------------------------------------------------------  IN:    sodium chloride 0.9%: 180 mL  Total IN: 180 mL    OUT:    Voided (mL): 550 mL  Total OUT: 550 mL    Total NET: -370 mL          LABS:                            12.7   11.79 )-----------( 176      ( 25 Jun 2025 05:26 )             38.6                                               06-25    141  |  109  |  27[H]  ----------------------------<  97  3.9   |  22  |  2.0[H]    Ca    8.1[L]      25 Jun 2025 05:26  Mg     2.3     06-25    TPro  5.5[L]  /  Alb  3.7  /  TBili  0.5  /  DBili  x   /  AST  20  /  ALT  15  /  AlkPhos  53  06-25                                             Urinalysis Basic - ( 25 Jun 2025 05:26 )    Color: x / Appearance: x / SG: x / pH: x  Gluc: 97 mg/dL / Ketone: x  / Bili: x / Urobili: x   Blood: x / Protein: x / Nitrite: x   Leuk Esterase: x / RBC: x / WBC x   Sq Epi: x / Non Sq Epi: x / Bacteria: x                                                  LIVER FUNCTIONS - ( 25 Jun 2025 05:26 )  Alb: 3.7 g/dL / Pro: 5.5 g/dL / ALK PHOS: 53 U/L / ALT: 15 U/L / AST: 20 U/L / GGT: x                                                  Culture - Blood (collected 23 Jun 2025 05:12)  Source: Blood None  Preliminary Report (24 Jun 2025 13:02):    No growth at 24 hours                                                                                       ABG - ( 24 Jun 2025 09:15 )  pH, Arterial: 7.35  pH, Blood: x     /  pCO2: 35    /  pO2: 119   / HCO3: 19    / Base Excess: -5.6  /  SaO2: 98.7                MEDICATIONS  (STANDING):  albuterol    90 MICROgram(s) HFA Inhaler 2 Puff(s) Inhalation two times a day  amLODIPine   Tablet 10 milliGRAM(s) Oral daily  atorvastatin 40 milliGRAM(s) Oral at bedtime  buPROPion XL (24-Hour) . 150 milliGRAM(s) Oral daily  chlorhexidine 2% Cloths 1 Application(s) Topical <User Schedule>  dicyclomine 10 milliGRAM(s) Oral three times a day before meals  fluPHENAZine 10 milliGRAM(s) Oral daily  fluticasone propionate/ salmeterol 500-50 MICROgram(s) Diskus 1 Dose(s) Inhalation two times a day  folic acid Injectable 1 milliGRAM(s) IV Push daily  heparin   Injectable 5000 Unit(s) SubCutaneous every 8 hours  hydrALAZINE Injectable 10 milliGRAM(s) IV Push three times a day  lactulose Syrup 20 Gram(s) Oral daily  lidocaine   4% Patch 1 Patch Transdermal daily  memantine 5 milliGRAM(s) Oral at bedtime  methylPREDNISolone sodium succinate Injectable 40 milliGRAM(s) IV Push every 24 hours  metoclopramide Injectable 10 milliGRAM(s) IV Push three times a day  nicotine -  14 mG/24Hr(s) Patch 1 Patch Transdermal daily  OLANZapine 20 milliGRAM(s) Oral at bedtime  pantoprazole  Injectable 40 milliGRAM(s) IV Push with breakfast  piperacillin/tazobactam IVPB.. 3.375 Gram(s) IV Intermittent every 8 hours  polyethylene glycol 3350 17 Gram(s) Oral two times a day  senna 2 Tablet(s) Oral at bedtime  tamsulosin 0.4 milliGRAM(s) Oral at bedtime  thiamine Injectable 100 milliGRAM(s) IV Push daily    MEDICATIONS  (PRN):  albuterol/ipratropium for Nebulization 3 milliLiter(s) Nebulizer every 6 hours PRN Shortness of Breath and/or Wheezing  cyclobenzaprine 5 milliGRAM(s) Oral three times a day PRN Muscle Spasm  LORazepam   Injectable 0.5 milliGRAM(s) IV Push every 8 hours PRN Agitation/anxiety  magnesium hydroxide Suspension 30 milliLiter(s) Oral daily PRN Constipation  melatonin 3 milliGRAM(s) Oral at bedtime PRN Insomnia  ondansetron Injectable 4 milliGRAM(s) IV Push every 8 hours PRN Nausea and/or Vomiting  oxycodone    5 mG/acetaminophen 325 mG 1 Tablet(s) Oral every 6 hours PRN Severe Pain (7 - 10)      New X-rays reviewed:                                                                                  ECHO    CXR interpreted by me:

## 2025-06-25 NOTE — PROGRESS NOTE ADULT - ATTENDING COMMENTS
Agree with resident's assessment and plan    55 time spent on review of labs, imaging studies, old records, obtaining history, personally examining patient, counselling and communicating with patient, entering orders for medications/tests/etc, discussions with other health care providers, documentation in electronic health records, independent interpretation of labs, imaging/procedure results and care coordination.

## 2025-06-25 NOTE — PROGRESS NOTE ADULT - ASSESSMENT
IMPRESSION:    Acute hypoxemic respiratory failure.    COPD exacerbation  Acute on chronic renal failure  Lactic acidosis improved  HO HTN, MDD, schizophrenia, dementia     PLAN:    CNS: Avoid CNS depressant.  CW OP meds    HEENT:  Oral care    PULMONARY:  HOB @ 45 degrees.  Aspiration precaution.  NIV during sleep and PRN during the day.  Wean O2 as tolerated.  Keep POX target 92-96%.  Solumedrol 40mg q24.  Nebs q6 PRN.  CT chest NC.  CXR noted     CARDIOVASCULAR: TTE noted nl EF.  BP control.  Equal balance. Lactate improved    GI: GI prophylaxis.  Prokinetic: as needed.  Feeding as tolerated per speech. Surgery eval noted.    RENAL:  F/u  lytes.  Correct as needed.  Strict Is and Os.      INFECTIOUS DISEASE:  Finish Zosyn course.  FU Cx.  MRSA nares negative.    HEMATOLOGICAL:  DVT prophylaxis.  LE doppler negative.    ENDOCRINE:  Follow up FS.  Insulin protocol if needed.    SDU IMPRESSION:    Acute hypoxemic respiratory failure.    COPD exacerbation  Aspiration PNA w/ recurrent aspiration events  Acute on chronic renal failure  Lactic acidosis improved  HO HTN, MDD, schizophrenia, dementia     PLAN:    CNS: Avoid CNS depressant.  CW OP meds.    HEENT:  Oral care    PULMONARY:  HOB @ 45 degrees.  Aspiration precaution.  NIV during sleep and PRN during the day.  Wean O2 as tolerated.  Keep POX target 92-96%.  Solumedrol 40mg q24.  Nebs q6 PRN.  CT chest NC noted w/ BL consolidations.  Chest PT    CARDIOVASCULAR: TTE noted nl EF.  BP control.  Equal balance.  Lactate improved    GI: GI prophylaxis.  Prokinetic PRN.  Feeding as tolerated per speech.  Surgery eval noted.  CT Abd noted.    RENAL:  F/u  lytes.  Correct as needed.  Strict Is and Os.      INFECTIOUS DISEASE:  Finish Zosyn course.  FU Cx.  MRSA nares negative.    HEMATOLOGICAL:  DVT prophylaxis.  LE doppler negative.    ENDOCRINE:  Follow up FS.  Insulin protocol if needed.    Full code  SDU   IMPRESSION:    Acute hypoxemic respiratory failure.    COPD exacerbation  Aspiration PNA w/ recurrent aspiration events  Acute on chronic renal failure  Lactic acidosis improved  HO HTN, MDD, schizophrenia, dementia     PLAN:    CNS: Avoid CNS depressant.  CW OP meds.    HEENT:  Oral care    PULMONARY:  HOB @ 45 degrees.  Aspiration precaution.  NIV during sleep and PRN during the day.  Wean O2 as tolerated.  Keep POX target 92-96%.  Solumedrol 40mg q24.  Nebs q6 PRN.  CT chest NC noted w/ BL consolidations / atelectasis.  Chest PT.  IPV. incentive spirometry    CARDIOVASCULAR: TTE noted nl EF.  BP control.  Equal balance.  Lactate improved    GI: GI prophylaxis.  Prokinetic PRN.  Feeding as tolerated per speech.  Surgery eval noted.  CT Abd noted.    RENAL:  F/u  lytes.  Correct as needed.  Strict Is and Os.      INFECTIOUS DISEASE:  Finish Zosyn course.  FU Cx.  MRSA nares negative.    HEMATOLOGICAL:  DVT prophylaxis.  LE doppler negative.    ENDOCRINE:  Follow up FS.  Insulin protocol if needed.    Full code    SDU

## 2025-06-25 NOTE — PROGRESS NOTE ADULT - GASTROINTESTINAL
soft/no guarding/no rigidity/distended/bowel sounds hypoactive
no guarding/no rigidity/distended/bowel sounds hypoactive
soft/nontender/nondistended/no guarding/no rigidity
no guarding/no rigidity/distended

## 2025-06-25 NOTE — PROGRESS NOTE ADULT - ASSESSMENT
80 yo male with a history of HTN, schizophrenia, dementia, and MDD presented to the ED due to sudden left-sided flank pain accompanied by SOB and cough that seems to exacerbate the pain. He is a current smoker. He developed AHRF associated with aspiration pneumonia and suspected COPD exacerbation. He is now on HFNC.    #Acute hypoxemic respiratory failure  #Aspiration pneumonia  #COPD exacerbation  #Left flank pain secondary to possible radiculopathy  #Constipation  - CTA Chest 6/17: Negative for pulmonary emboli. Emphysematous changes. Bibasilar atelectasis. Areas of ground glass attenuation which could reflect air trapping/airway disease.  - CT A/P 6/17: no acute abdominal findings  - CXR 6/24: B/L lower lobe opacities  - CT A/P 6/24: No significant change from recent CT. No CT evidence of acute abdominal pathology. B/L basilar consolidation secondary to aspiration.  - CT chest 6/24: B/L consolidations/atelectasis suggestive of aspiration and fracture of left 10th rib (likely causing his flank pain)  - Lipase wnl  - MRSA negative, vancomycin was d/c.   - C/w zosyn due to previous aspiration event   - Pulmonary following: suggest aspiration precautions, HOB 45 degrees, c/w solumedrol 40mg IV once daily and nebs q6hrs, wean off HFNC, BIPAP if having severe coughing fits, IPV, incentive spirometry  - Feeding per speech/swallow eval  - C/w lactulose, miralax, senna  - C/w pain relief regimen (lidocaine patch, oxycodone, Flexeril)  - Reglan ppx prn for nausea  - ID following: c/w Zosyn until 6/27  - Swallow FEES performed: mild oropharyngeal dysphagia, recommended consistency minced and moist with thin liquids  - PT eval      #Acute on chronic renal failure  #Lactic acidosis  - Elevated Cr at 2.3 and BUN at 34, eGFR 29  - Lactate remains elevated at 2.8, repeat ABGs  - C/w Flomax  - Monitor BMP and correct as needed  - Monitor I&Os      #HTN, schizophrenia, dementia, MDD  - C/w amlodipine, hydralazine, psych meds    #MISC  -DVT ppx: heparin SQ  -GI ppx: protonix  -DIET: DASH/TLC  -Activity: AAT  -Code Status: Full   -DIspo: SDU    -Pending: PT, IPV, chest PT, continue pain assessment, wean HFNC, monitor BMP, finish Zosyn course

## 2025-06-26 LAB
ALBUMIN SERPL ELPH-MCNC: 4 G/DL — SIGNIFICANT CHANGE UP (ref 3.5–5.2)
ALP SERPL-CCNC: 68 U/L — SIGNIFICANT CHANGE UP (ref 30–115)
ALT FLD-CCNC: 26 U/L — SIGNIFICANT CHANGE UP (ref 0–41)
ANION GAP SERPL CALC-SCNC: 11 MMOL/L — SIGNIFICANT CHANGE UP (ref 7–14)
AST SERPL-CCNC: 29 U/L — SIGNIFICANT CHANGE UP (ref 0–41)
BASOPHILS # BLD AUTO: 0.04 K/UL — SIGNIFICANT CHANGE UP (ref 0–0.2)
BASOPHILS NFR BLD AUTO: 0.4 % — SIGNIFICANT CHANGE UP (ref 0–1)
BILIRUB SERPL-MCNC: 0.7 MG/DL — SIGNIFICANT CHANGE UP (ref 0.2–1.2)
BUN SERPL-MCNC: 22 MG/DL — HIGH (ref 10–20)
CALCIUM SERPL-MCNC: 8.6 MG/DL — SIGNIFICANT CHANGE UP (ref 8.4–10.5)
CHLORIDE SERPL-SCNC: 105 MMOL/L — SIGNIFICANT CHANGE UP (ref 98–110)
CO2 SERPL-SCNC: 21 MMOL/L — SIGNIFICANT CHANGE UP (ref 17–32)
CREAT SERPL-MCNC: 2.1 MG/DL — HIGH (ref 0.7–1.5)
CULTURE RESULTS: SIGNIFICANT CHANGE UP
EGFR: 32 ML/MIN/1.73M2 — LOW
EGFR: 32 ML/MIN/1.73M2 — LOW
EOSINOPHIL # BLD AUTO: 0.06 K/UL — SIGNIFICANT CHANGE UP (ref 0–0.7)
EOSINOPHIL NFR BLD AUTO: 0.6 % — SIGNIFICANT CHANGE UP (ref 0–8)
GLUCOSE SERPL-MCNC: 143 MG/DL — HIGH (ref 70–99)
HCT VFR BLD CALC: 41.8 % — LOW (ref 42–52)
HGB BLD-MCNC: 14 G/DL — SIGNIFICANT CHANGE UP (ref 14–18)
IMM GRANULOCYTES NFR BLD AUTO: 4.9 % — HIGH (ref 0.1–0.3)
LYMPHOCYTES # BLD AUTO: 1.02 K/UL — LOW (ref 1.2–3.4)
LYMPHOCYTES # BLD AUTO: 10.4 % — LOW (ref 20.5–51.1)
MAGNESIUM SERPL-MCNC: 2.1 MG/DL — SIGNIFICANT CHANGE UP (ref 1.8–2.4)
MCHC RBC-ENTMCNC: 30.2 PG — SIGNIFICANT CHANGE UP (ref 27–31)
MCHC RBC-ENTMCNC: 33.5 G/DL — SIGNIFICANT CHANGE UP (ref 32–37)
MCV RBC AUTO: 90.3 FL — SIGNIFICANT CHANGE UP (ref 80–94)
MONOCYTES # BLD AUTO: 0.44 K/UL — SIGNIFICANT CHANGE UP (ref 0.1–0.6)
MONOCYTES NFR BLD AUTO: 4.5 % — SIGNIFICANT CHANGE UP (ref 1.7–9.3)
NEUTROPHILS # BLD AUTO: 7.74 K/UL — HIGH (ref 1.4–6.5)
NEUTROPHILS NFR BLD AUTO: 79.2 % — HIGH (ref 42.2–75.2)
NRBC BLD AUTO-RTO: 0 /100 WBCS — SIGNIFICANT CHANGE UP (ref 0–0)
PLATELET # BLD AUTO: 197 K/UL — SIGNIFICANT CHANGE UP (ref 130–400)
PMV BLD: 10.4 FL — SIGNIFICANT CHANGE UP (ref 7.4–10.4)
POTASSIUM SERPL-MCNC: 4.3 MMOL/L — SIGNIFICANT CHANGE UP (ref 3.5–5)
POTASSIUM SERPL-SCNC: 4.3 MMOL/L — SIGNIFICANT CHANGE UP (ref 3.5–5)
PROT SERPL-MCNC: 6.2 G/DL — SIGNIFICANT CHANGE UP (ref 6–8)
RBC # BLD: 4.63 M/UL — LOW (ref 4.7–6.1)
RBC # FLD: 13.4 % — SIGNIFICANT CHANGE UP (ref 11.5–14.5)
SODIUM SERPL-SCNC: 137 MMOL/L — SIGNIFICANT CHANGE UP (ref 135–146)
SPECIMEN SOURCE: SIGNIFICANT CHANGE UP
WBC # BLD: 9.78 K/UL — SIGNIFICANT CHANGE UP (ref 4.8–10.8)
WBC # FLD AUTO: 9.78 K/UL — SIGNIFICANT CHANGE UP (ref 4.8–10.8)

## 2025-06-26 PROCEDURE — 71045 X-RAY EXAM CHEST 1 VIEW: CPT | Mod: 26

## 2025-06-26 PROCEDURE — 99233 SBSQ HOSP IP/OBS HIGH 50: CPT

## 2025-06-26 RX ORDER — PREDNISONE 20 MG/1
TABLET ORAL
Refills: 0 | Status: DISCONTINUED | OUTPATIENT
Start: 2025-06-26 | End: 2025-06-29

## 2025-06-26 RX ORDER — FOLIC ACID 1 MG/1
1 TABLET ORAL DAILY
Refills: 0 | Status: DISCONTINUED | OUTPATIENT
Start: 2025-06-26 | End: 2025-07-01

## 2025-06-26 RX ORDER — PREDNISONE 20 MG/1
40 TABLET ORAL DAILY
Refills: 0 | Status: COMPLETED | OUTPATIENT
Start: 2025-06-26 | End: 2025-06-27

## 2025-06-26 RX ORDER — PREDNISONE 20 MG/1
30 TABLET ORAL DAILY
Refills: 0 | Status: COMPLETED | OUTPATIENT
Start: 2025-06-28 | End: 2025-06-29

## 2025-06-26 RX ADMIN — Medication 40 MILLIGRAM(S): at 09:04

## 2025-06-26 RX ADMIN — Medication 1 APPLICATION(S): at 05:24

## 2025-06-26 RX ADMIN — Medication 2 TABLET(S): at 22:15

## 2025-06-26 RX ADMIN — Medication 1 DOSE(S): at 13:26

## 2025-06-26 RX ADMIN — ATORVASTATIN CALCIUM 40 MILLIGRAM(S): 80 TABLET, FILM COATED ORAL at 22:18

## 2025-06-26 RX ADMIN — Medication 10 MILLIGRAM(S): at 05:23

## 2025-06-26 RX ADMIN — Medication 25 GRAM(S): at 05:22

## 2025-06-26 RX ADMIN — Medication 10 MILLIGRAM(S): at 11:53

## 2025-06-26 RX ADMIN — LIDOCAINE HYDROCHLORIDE 1 PATCH: 20 JELLY TOPICAL at 11:54

## 2025-06-26 RX ADMIN — MEMANTINE HYDROCHLORIDE 5 MILLIGRAM(S): 21 CAPSULE, EXTENDED RELEASE ORAL at 22:15

## 2025-06-26 RX ADMIN — LIDOCAINE HYDROCHLORIDE 1 PATCH: 20 JELLY TOPICAL at 22:00

## 2025-06-26 RX ADMIN — BUPROPION HYDROBROMIDE 150 MILLIGRAM(S): 522 TABLET, EXTENDED RELEASE ORAL at 11:52

## 2025-06-26 RX ADMIN — HEPARIN SODIUM 5000 UNIT(S): 1000 INJECTION INTRAVENOUS; SUBCUTANEOUS at 22:16

## 2025-06-26 RX ADMIN — METHYLPREDNISOLONE ACETATE 40 MILLIGRAM(S): 80 INJECTION, SUSPENSION INTRA-ARTICULAR; INTRALESIONAL; INTRAMUSCULAR; SOFT TISSUE at 02:35

## 2025-06-26 RX ADMIN — NICOTINE POLACRILEX 1 PATCH: 4 GUM, CHEWING ORAL at 06:50

## 2025-06-26 RX ADMIN — NICOTINE POLACRILEX 1 PATCH: 4 GUM, CHEWING ORAL at 11:53

## 2025-06-26 RX ADMIN — Medication 25 MILLIGRAM(S): at 13:26

## 2025-06-26 RX ADMIN — TAMSULOSIN HYDROCHLORIDE 0.4 MILLIGRAM(S): 0.4 CAPSULE ORAL at 22:16

## 2025-06-26 RX ADMIN — LACTULOSE 20 GRAM(S): 10 SOLUTION ORAL at 11:52

## 2025-06-26 RX ADMIN — AMLODIPINE BESYLATE 10 MILLIGRAM(S): 10 TABLET ORAL at 05:24

## 2025-06-26 RX ADMIN — NICOTINE POLACRILEX 1 PATCH: 4 GUM, CHEWING ORAL at 22:00

## 2025-06-26 RX ADMIN — OLANZAPINE 20 MILLIGRAM(S): 10 TABLET ORAL at 22:15

## 2025-06-26 RX ADMIN — Medication 1 DOSE(S): at 20:37

## 2025-06-26 RX ADMIN — Medication 25 MILLIGRAM(S): at 22:16

## 2025-06-26 RX ADMIN — HEPARIN SODIUM 5000 UNIT(S): 1000 INJECTION INTRAVENOUS; SUBCUTANEOUS at 13:26

## 2025-06-26 RX ADMIN — IPRATROPIUM BROMIDE AND ALBUTEROL SULFATE 3 MILLILITER(S): .5; 2.5 SOLUTION RESPIRATORY (INHALATION) at 05:48

## 2025-06-26 RX ADMIN — NICOTINE POLACRILEX 1 PATCH: 4 GUM, CHEWING ORAL at 12:00

## 2025-06-26 RX ADMIN — HEPARIN SODIUM 5000 UNIT(S): 1000 INJECTION INTRAVENOUS; SUBCUTANEOUS at 05:23

## 2025-06-26 RX ADMIN — Medication 25 GRAM(S): at 13:25

## 2025-06-26 RX ADMIN — Medication 10 MILLIGRAM(S): at 17:09

## 2025-06-26 RX ADMIN — Medication 25 GRAM(S): at 22:13

## 2025-06-26 RX ADMIN — Medication 100 MILLIGRAM(S): at 11:55

## 2025-06-26 RX ADMIN — FOLIC ACID 1 MILLIGRAM(S): 1 TABLET ORAL at 11:52

## 2025-06-26 RX ADMIN — LIDOCAINE HYDROCHLORIDE 1 PATCH: 20 JELLY TOPICAL at 22:37

## 2025-06-26 NOTE — SWALLOW BEDSIDE ASSESSMENT ADULT - ORAL PHASE
Delayed oral transit time
Within functional limits

## 2025-06-26 NOTE — PROGRESS NOTE ADULT - SUBJECTIVE AND OBJECTIVE BOX
24H events:    Today is hospital day 9d. This morning patient was seen and examined at bedside, resting comfortably in bed on HFNC 40:40. He appears better, not reporting as much left-sided flank pain and his SOB and cough have reduced since yesterday. He was trialed on LFNC which was successful, he is currently on 4 L NC. He denies fever/chills, nausea, vomiting. No acute or major events overnight.    PAST MEDICAL & SURGICAL HISTORY  HTN, Schizophrenia, MDD, dementia    No significant past surgical history        SOCIAL HISTORY:  Social History:      ALLERGIES:  No Known Allergies      MEDICATIONS:  STANDING MEDICATIONS  albuterol    90 MICROgram(s) HFA Inhaler 2 Puff(s) Inhalation two times a day  amLODIPine   Tablet 10 milliGRAM(s) Oral daily  atorvastatin 40 milliGRAM(s) Oral at bedtime  buPROPion XL (24-Hour) . 150 milliGRAM(s) Oral daily  chlorhexidine 2% Cloths 1 Application(s) Topical <User Schedule>  dicyclomine 10 milliGRAM(s) Oral three times a day before meals  fluPHENAZine 10 milliGRAM(s) Oral daily  fluticasone propionate/ salmeterol 500-50 MICROgram(s) Diskus 1 Dose(s) Inhalation two times a day  folic acid 1 milliGRAM(s) Oral daily  heparin   Injectable 5000 Unit(s) SubCutaneous every 8 hours  hydrALAZINE 25 milliGRAM(s) Oral three times a day  lactulose Syrup 20 Gram(s) Oral daily  lidocaine   4% Patch 1 Patch Transdermal daily  memantine 5 milliGRAM(s) Oral at bedtime  nicotine -  14 mG/24Hr(s) Patch 1 Patch Transdermal daily  OLANZapine 20 milliGRAM(s) Oral at bedtime  pantoprazole  Injectable 40 milliGRAM(s) IV Push with breakfast  piperacillin/tazobactam IVPB.. 3.375 Gram(s) IV Intermittent every 8 hours  polyethylene glycol 3350 17 Gram(s) Oral two times a day  predniSONE   Tablet   Oral   predniSONE   Tablet 40 milliGRAM(s) Oral daily  senna 2 Tablet(s) Oral at bedtime  tamsulosin 0.4 milliGRAM(s) Oral at bedtime  thiamine Injectable 100 milliGRAM(s) IV Push daily    PRN MEDICATIONS  albuterol/ipratropium for Nebulization 3 milliLiter(s) Nebulizer every 6 hours PRN  cyclobenzaprine 5 milliGRAM(s) Oral three times a day PRN  LORazepam   Injectable 0.5 milliGRAM(s) IV Push every 8 hours PRN  magnesium hydroxide Suspension 30 milliLiter(s) Oral daily PRN  melatonin 3 milliGRAM(s) Oral at bedtime PRN  ondansetron Injectable 4 milliGRAM(s) IV Push every 8 hours PRN  oxycodone    5 mG/acetaminophen 325 mG 1 Tablet(s) Oral every 6 hours PRN      VITALS:   T(C): 35.5 (06-26-25 @ 11:11), Max: 36.3 (06-26-25 @ 04:00)  HR: 77 (06-26-25 @ 13:20) (77 - 98)  BP: 113/64 (06-26-25 @ 13:20) (110/62 - 164/77)  RR: 18 (06-26-25 @ 16:03) (17 - 20)  SpO2: 98% (06-26-25 @ 16:03) (94% - 98%)  I&O's Summary    25 Jun 2025 07:01 - 26 Jun 2025 07:00  --------------------------------------------------------  IN: 200 mL / OUT: 1600 mL / NET: -1400 mL    26 Jun 2025 07:01  -  26 Jun 2025 16:14  --------------------------------------------------------  IN: 240 mL / OUT: 1450 mL / NET: -1210 mL          PHYSICAL EXAM:  GENERAL: lying in bed on LFNC, no acute distress  NERVOUS SYSTEM:  Alert & Oriented X3,  motor and sensory intact  CHEST/LUNG: B/L good air entry; No rales, rhonchi, or wheezing  HEART: S1S2 normal, no S3, Regular rate and rhythm; No murmurs heard  ABDOMEN: Soft, Nontender, Nondistended; Bowel sounds present  EXTREMITIES:  2+ Peripheral Pulses, No edema  SKIN: No rashes or lesions    LABS:                        14.0   9.78  )-----------( 197      ( 26 Jun 2025 06:13 )             41.8     06-26    137  |  105  |  22[H]  ----------------------------<  143[H]  4.3   |  21  |  2.1[H]    Ca    8.6      26 Jun 2025 06:13  Mg     2.1     06-26    TPro  6.2  /  Alb  4.0  /  TBili  0.7  /  DBili  x   /  AST  29  /  ALT  26  /  AlkPhos  68  06-26      Urinalysis Basic - ( 26 Jun 2025 06:13 )    Color: x / Appearance: x / SG: x / pH: x  Gluc: 143 mg/dL / Ketone: x  / Bili: x / Urobili: x   Blood: x / Protein: x / Nitrite: x   Leuk Esterase: x / RBC: x / WBC x   Sq Epi: x / Non Sq Epi: x / Bacteria: x      Sodium: 139 mmol/L (06-24-25 @ 05:30)  Sodium: 141 mmol/L (06-25-25 @ 05:26)  Sodium: 137 mmol/L (06-26-25 @ 06:13)    Potassium: 4.6 mmol/L (06-24-25 @ 05:30)  Potassium: 3.9 mmol/L (06-25-25 @ 05:26)  Potassium: 4.3 mmol/L (06-26-25 @ 06:13)    Creatinine: 2.3 mg/dL *H* (06-24-25 @ 05:30)  Creatinine: 2.0 mg/dL *H* (06-25-25 @ 05:26)  Creatinine: 2.1 mg/dL *H* (06-26-25 @ 06:13)    Hemoglobin: 13.9 g/dL *L* (06-24-25 @ 05:30)  Hemoglobin: 12.7 g/dL *L* (06-25-25 @ 05:26)  Hemoglobin: 14.0 g/dL (06-26-25 @ 06:13)    Platelet Count - Automated: 221 K/uL (06-24-25 @ 05:30)  Platelet Count - Automated: 176 K/uL (06-25-25 @ 05:26)  Platelet Count - Automated: 197 K/uL (06-26-25 @ 06:13)      WBC Count: 15.86 K/uL *H* (06-24-25 @ 05:30)  WBC Count: 11.79 K/uL *H* (06-25-25 @ 05:26)  WBC Count: 9.78 K/uL (06-26-25 @ 06:13)    Lactate, Blood: 2.2 mmol/L *H* (06-21-25 @ 01:18)  Lactate, Blood: 1.3 mmol/L (06-21-25 @ 05:00)  Lactate, Blood: 1.3 mmol/L (06-23-25 @ 11:03)

## 2025-06-26 NOTE — SWALLOW BEDSIDE ASSESSMENT ADULT - CONSISTENCIES ADMINISTERED
thin liquid/mildly thick/soft & bite-sized
thin liquid/mildly thick/soft & bite-sized
thin liquid/pureed/minced & moist
thin liquid/pureed/soft & bite-sized

## 2025-06-26 NOTE — PROGRESS NOTE ADULT - SUBJECTIVE AND OBJECTIVE BOX
SUBJECTIVE:   ANILA HORTON (79y Male) was seen at bedside. Eating his food without difficulties. He was requesting discharge as he feels better. Denies any SOB or cough now.     PHYSICAL EXAM:  General: not in distress, ill-appearing  Lungs:  bilateral clear air entry with rhonchi   Heart: S1, S2, no murmur   Abdomen: soft, non tender, non distended  Neuro: AAOx3, no focal deficits  Extremity: No edema, cyanosis.    Vital Signs Last 24 Hrs  T(C): 35.5 (26 Jun 2025 11:11), Max: 36.3 (26 Jun 2025 04:00)  T(F): 95.9 (26 Jun 2025 11:11), Max: 97.4 (26 Jun 2025 04:00)  HR: 85 (26 Jun 2025 11:11) (73 - 98)  BP: 113/69 (26 Jun 2025 11:11) (104/52 - 164/77)  BP(mean): 85 (26 Jun 2025 11:11) (74 - 111)  RR: 18 (26 Jun 2025 11:11) (17 - 20)  SpO2: 96% (26 Jun 2025 11:11) (94% - 98%)    Parameters below as of 26 Jun 2025 09:18  Patient On (Oxygen Delivery Method): nasal cannula, high flow  O2 Flow (L/min): 40  O2 Concentration (%): 40    MEDICATIONS  (STANDING):  albuterol    90 MICROgram(s) HFA Inhaler 2 Puff(s) Inhalation two times a day  amLODIPine   Tablet 10 milliGRAM(s) Oral daily  atorvastatin 40 milliGRAM(s) Oral at bedtime  buPROPion XL (24-Hour) . 150 milliGRAM(s) Oral daily  chlorhexidine 2% Cloths 1 Application(s) Topical <User Schedule>  dicyclomine 10 milliGRAM(s) Oral three times a day before meals  fluPHENAZine 10 milliGRAM(s) Oral daily  fluticasone propionate/ salmeterol 500-50 MICROgram(s) Diskus 1 Dose(s) Inhalation two times a day  folic acid 1 milliGRAM(s) Oral daily  heparin   Injectable 5000 Unit(s) SubCutaneous every 8 hours  hydrALAZINE 25 milliGRAM(s) Oral three times a day  lactulose Syrup 20 Gram(s) Oral daily  lidocaine   4% Patch 1 Patch Transdermal daily  memantine 5 milliGRAM(s) Oral at bedtime  nicotine -  14 mG/24Hr(s) Patch 1 Patch Transdermal daily  OLANZapine 20 milliGRAM(s) Oral at bedtime  pantoprazole  Injectable 40 milliGRAM(s) IV Push with breakfast  piperacillin/tazobactam IVPB.. 3.375 Gram(s) IV Intermittent every 8 hours  polyethylene glycol 3350 17 Gram(s) Oral two times a day  predniSONE   Tablet   Oral   predniSONE   Tablet 40 milliGRAM(s) Oral daily  senna 2 Tablet(s) Oral at bedtime  tamsulosin 0.4 milliGRAM(s) Oral at bedtime  thiamine Injectable 100 milliGRAM(s) IV Push daily    MEDICATIONS  (PRN):  albuterol/ipratropium for Nebulization 3 milliLiter(s) Nebulizer every 6 hours PRN Shortness of Breath and/or Wheezing  cyclobenzaprine 5 milliGRAM(s) Oral three times a day PRN Muscle Spasm  LORazepam   Injectable 0.5 milliGRAM(s) IV Push every 8 hours PRN Agitation/anxiety  magnesium hydroxide Suspension 30 milliLiter(s) Oral daily PRN Constipation  melatonin 3 milliGRAM(s) Oral at bedtime PRN Insomnia  ondansetron Injectable 4 milliGRAM(s) IV Push every 8 hours PRN Nausea and/or Vomiting  oxycodone    5 mG/acetaminophen 325 mG 1 Tablet(s) Oral every 6 hours PRN Severe Pain (7 - 10)

## 2025-06-26 NOTE — SWALLOW BEDSIDE ASSESSMENT ADULT - SPECIFY REASON(S)
swallow f/u s/p FEES
swallow f/u
reconsult- RN reports poor tolerance of meals with coughing
swallow eval

## 2025-06-26 NOTE — SWALLOW BEDSIDE ASSESSMENT ADULT - ORAL PREPARATORY PHASE
Within functional limits
Decreased mastication ability

## 2025-06-26 NOTE — SWALLOW BEDSIDE ASSESSMENT ADULT - PHARYNGEAL PHASE
Within functional limits
Within functional limits
for thins/Delayed cough post oral intake
for thins/Delayed cough post oral intake

## 2025-06-26 NOTE — PROGRESS NOTE ADULT - ASSESSMENT
----- Incomplete Note ----- 80 yo male with a history of HTN, schizophrenia, dementia, and MDD presented to the ED due to sudden left-sided flank pain accompanied by SOB and cough that seems to exacerbate the pain. He is a current smoker. He developed AHRF associated with aspiration pneumonia and suspected COPD exacerbation. He is now on LFNC.    #Acute hypoxemic respiratory failure  #Aspiration pneumonia  #COPD exacerbation  #Left flank pain secondary to possible radiculopathy  #Constipation  - CTA Chest 6/17: Negative for pulmonary emboli. Emphysematous changes. Bibasilar atelectasis. Areas of ground glass attenuation which could reflect air trapping/airway disease.  - CT A/P 6/17: no acute abdominal findings  - CXR 6/24: B/L lower lobe opacities  - CT A/P 6/24: No significant change from recent CT. No CT evidence of acute abdominal pathology. B/L basilar consolidation secondary to aspiration.  - CT chest 6/24: B/L consolidations/atelectasis suggestive of aspiration and fracture of left 10th rib (likely causing his flank pain)  - Lipase wnl  - MRSA negative, vancomycin was d/c.   - C/w zosyn due to previous aspiration event   - Pulmonary following: suggest aspiration precautions, HOB 45 degrees, solumedrol 40mg IV and then prednisone taper starting tomorrow, nebs q6hrs prn, keep on LFNC with HFNC on standby, BIPAP if having severe coughing fits, IPV, chest PT, incentive spirometry, repeat CXR  - Feeding per speech/swallow eval  - C/w lactulose, miralax, senna  - C/w pain relief regimen (lidocaine patch, oxycodone, Flexeril)  - Reglan ppx prn for nausea  - ID following: c/w Zosyn until 6/27  - Swallow FEES performed: mild oropharyngeal dysphagia, recommended consistency minced and moist with thin liquids  - PT eval      #Acute on chronic renal failure  #Lactic acidosis  - Elevated Cr at 2.1 and BUN at 22, eGFR 32  - Lactate remains elevated at 2.8, repeat ABGs  - C/w Flomax  - Monitor BMP and correct as needed  - Monitor I&Os      #HTN, schizophrenia, dementia, MDD  - C/w amlodipine, hydralazine, psych meds    #MISC  -DVT ppx: heparin SQ  -GI ppx: protonix  -DIET: DASH/TLC  -Activity: AAT  -Code Status: Full   -DIspo: SDU    -Pending: PT, prednisone taper, IPV, chest PT, continue pain assessment, keep on LFNC and wean as tolerated, monitor BMP, finish Zosyn course    55 time spent on review of labs, imaging studies, old records, obtaining history, personally examining patient, counselling and communicating with patient, entering orders for medications/tests/etc, discussions with other health care providers, documentation in electronic health records, independent interpretation of labs, imaging/procedure results and care coordination.

## 2025-06-26 NOTE — PROGRESS NOTE ADULT - SUBJECTIVE AND OBJECTIVE BOX
Patient is a 79y old  Male who presents with a chief complaint of SOB (25 Jun 2025 21:21)      Over Night Events:        ROS:     All ROS are negative except HPI         PHYSICAL EXAM    ICU Vital Signs Last 24 Hrs  T(C): 35.7 (26 Jun 2025 07:32), Max: 36.4 (25 Jun 2025 07:47)  T(F): 96.3 (26 Jun 2025 07:32), Max: 97.5 (25 Jun 2025 07:47)  HR: 83 (26 Jun 2025 07:32) (69 - 98)  BP: 116/65 (26 Jun 2025 07:32) (104/52 - 164/77)  BP(mean): 85 (26 Jun 2025 07:32) (74 - 111)  ABP: --  ABP(mean): --  RR: 18 (26 Jun 2025 07:32) (18 - 20)  SpO2: 96% (26 Jun 2025 07:32) (94% - 100%)    O2 Parameters below as of 26 Jun 2025 05:50  Patient On (Oxygen Delivery Method): mask, aerosol            CONSTITUTIONAL:  NAD    ENT:   Airway patent,   Mouth with normal mucosa.   No thrush    EYES:   Pupils equal,   Round and reactive to light.    CARDIAC:   Normal rate,   Regular rhythm.    No edema    RESPIRATORY:   No wheezing  Bilateral BS  Normal chest expansion  Not tachypneic,  No use of accessory muscles    GASTROINTESTINAL:  Abdomen soft,   Non-tender,   No guarding,   + BS    MUSCULOSKELETAL:   Range of motion is not limited,  No clubbing, cyanosis    NEUROLOGICAL:   Alert and oriented   No motor  deficits.    SKIN:   Skin normal color for race,   Warm and dry and intact.   No evidence of rash.        06-25-25 @ 07:01  -  06-26-25 @ 07:00  --------------------------------------------------------  IN:    IV PiggyBack: 200 mL  Total IN: 200 mL    OUT:    Voided (mL): 1600 mL  Total OUT: 1600 mL    Total NET: -1400 mL          LABS:                            14.0   9.78  )-----------( 197      ( 26 Jun 2025 06:13 )             41.8                                               06-26    137  |  105  |  22[H]  ----------------------------<  143[H]  4.3   |  21  |  2.1[H]    Ca    8.6      26 Jun 2025 06:13  Mg     2.1     06-26    TPro  6.2  /  Alb  4.0  /  TBili  0.7  /  DBili  x   /  AST  29  /  ALT  26  /  AlkPhos  68  06-26                                             Urinalysis Basic - ( 26 Jun 2025 06:13 )    Color: x / Appearance: x / SG: x / pH: x  Gluc: 143 mg/dL / Ketone: x  / Bili: x / Urobili: x   Blood: x / Protein: x / Nitrite: x   Leuk Esterase: x / RBC: x / WBC x   Sq Epi: x / Non Sq Epi: x / Bacteria: x                                                  LIVER FUNCTIONS - ( 26 Jun 2025 06:13 )  Alb: 4.0 g/dL / Pro: 6.2 g/dL / ALK PHOS: 68 U/L / ALT: 26 U/L / AST: 29 U/L / GGT: x                                                                                                                                   ABG - ( 24 Jun 2025 09:15 )  pH, Arterial: 7.35  pH, Blood: x     /  pCO2: 35    /  pO2: 119   / HCO3: 19    / Base Excess: -5.6  /  SaO2: 98.7                MEDICATIONS  (STANDING):  albuterol    90 MICROgram(s) HFA Inhaler 2 Puff(s) Inhalation two times a day  amLODIPine   Tablet 10 milliGRAM(s) Oral daily  atorvastatin 40 milliGRAM(s) Oral at bedtime  buPROPion XL (24-Hour) . 150 milliGRAM(s) Oral daily  chlorhexidine 2% Cloths 1 Application(s) Topical <User Schedule>  dicyclomine 10 milliGRAM(s) Oral three times a day before meals  fluPHENAZine 10 milliGRAM(s) Oral daily  fluticasone propionate/ salmeterol 500-50 MICROgram(s) Diskus 1 Dose(s) Inhalation two times a day  folic acid Injectable 1 milliGRAM(s) IV Push daily  heparin   Injectable 5000 Unit(s) SubCutaneous every 8 hours  hydrALAZINE Injectable 10 milliGRAM(s) IV Push three times a day  lactulose Syrup 20 Gram(s) Oral daily  lidocaine   4% Patch 1 Patch Transdermal daily  memantine 5 milliGRAM(s) Oral at bedtime  methylPREDNISolone sodium succinate Injectable 40 milliGRAM(s) IV Push every 24 hours  nicotine -  14 mG/24Hr(s) Patch 1 Patch Transdermal daily  OLANZapine 20 milliGRAM(s) Oral at bedtime  pantoprazole  Injectable 40 milliGRAM(s) IV Push with breakfast  piperacillin/tazobactam IVPB.. 3.375 Gram(s) IV Intermittent every 8 hours  polyethylene glycol 3350 17 Gram(s) Oral two times a day  senna 2 Tablet(s) Oral at bedtime  tamsulosin 0.4 milliGRAM(s) Oral at bedtime  thiamine Injectable 100 milliGRAM(s) IV Push daily    MEDICATIONS  (PRN):  albuterol/ipratropium for Nebulization 3 milliLiter(s) Nebulizer every 6 hours PRN Shortness of Breath and/or Wheezing  cyclobenzaprine 5 milliGRAM(s) Oral three times a day PRN Muscle Spasm  LORazepam   Injectable 0.5 milliGRAM(s) IV Push every 8 hours PRN Agitation/anxiety  magnesium hydroxide Suspension 30 milliLiter(s) Oral daily PRN Constipation  melatonin 3 milliGRAM(s) Oral at bedtime PRN Insomnia  metoclopramide Injectable 10 milliGRAM(s) IV Push three times a day PRN nausea  ondansetron Injectable 4 milliGRAM(s) IV Push every 8 hours PRN Nausea and/or Vomiting  oxycodone    5 mG/acetaminophen 325 mG 1 Tablet(s) Oral every 6 hours PRN Severe Pain (7 - 10)      New X-rays reviewed:                                                                                  ECHO    CXR interpreted by me:       Patient is a 79y old  Male who presents with a chief complaint of SOB (25 Jun 2025 21:21)      Over Night Events: On HFNC 40/40.        ROS:     All ROS are negative except HPI         PHYSICAL EXAM    ICU Vital Signs Last 24 Hrs  T(C): 35.7 (26 Jun 2025 07:32), Max: 36.4 (25 Jun 2025 07:47)  T(F): 96.3 (26 Jun 2025 07:32), Max: 97.5 (25 Jun 2025 07:47)  HR: 83 (26 Jun 2025 07:32) (69 - 98)  BP: 116/65 (26 Jun 2025 07:32) (104/52 - 164/77)  BP(mean): 85 (26 Jun 2025 07:32) (74 - 111)  ABP: --  ABP(mean): --  RR: 18 (26 Jun 2025 07:32) (18 - 20)  SpO2: 96% (26 Jun 2025 07:32) (94% - 100%)    O2 Parameters below as of 26 Jun 2025 05:50  Patient On (Oxygen Delivery Method): mask, aerosol            CONSTITUTIONAL:  NAD    ENT:   Airway patent,   Mouth with normal mucosa.   No thrush    EYES:   Pupils equal,   Round and reactive to light.    CARDIAC:   Normal rate,   Regular rhythm.    No edema    RESPIRATORY:   No wheezing  Bilateral BS  Normal chest expansion  Not tachypneic,  No use of accessory muscles    GASTROINTESTINAL:  Abdomen soft,   Non-tender    MUSCULOSKELETAL:   Range of motion is not limited,  No clubbing, cyanosis    NEUROLOGICAL:   Awake  No motor  deficits.    SKIN:   Skin normal color for race,   Warm and dry and intact.   No evidence of rash.        06-25-25 @ 07:01  -  06-26-25 @ 07:00  --------------------------------------------------------  IN:    IV PiggyBack: 200 mL  Total IN: 200 mL    OUT:    Voided (mL): 1600 mL  Total OUT: 1600 mL    Total NET: -1400 mL          LABS:                            14.0   9.78  )-----------( 197      ( 26 Jun 2025 06:13 )             41.8                                               06-26    137  |  105  |  22[H]  ----------------------------<  143[H]  4.3   |  21  |  2.1[H]    Ca    8.6      26 Jun 2025 06:13  Mg     2.1     06-26    TPro  6.2  /  Alb  4.0  /  TBili  0.7  /  DBili  x   /  AST  29  /  ALT  26  /  AlkPhos  68  06-26                                             Urinalysis Basic - ( 26 Jun 2025 06:13 )    Color: x / Appearance: x / SG: x / pH: x  Gluc: 143 mg/dL / Ketone: x  / Bili: x / Urobili: x   Blood: x / Protein: x / Nitrite: x   Leuk Esterase: x / RBC: x / WBC x   Sq Epi: x / Non Sq Epi: x / Bacteria: x                                                  LIVER FUNCTIONS - ( 26 Jun 2025 06:13 )  Alb: 4.0 g/dL / Pro: 6.2 g/dL / ALK PHOS: 68 U/L / ALT: 26 U/L / AST: 29 U/L / GGT: x                                                                                                                                   ABG - ( 24 Jun 2025 09:15 )  pH, Arterial: 7.35  pH, Blood: x     /  pCO2: 35    /  pO2: 119   / HCO3: 19    / Base Excess: -5.6  /  SaO2: 98.7                MEDICATIONS  (STANDING):  albuterol    90 MICROgram(s) HFA Inhaler 2 Puff(s) Inhalation two times a day  amLODIPine   Tablet 10 milliGRAM(s) Oral daily  atorvastatin 40 milliGRAM(s) Oral at bedtime  buPROPion XL (24-Hour) . 150 milliGRAM(s) Oral daily  chlorhexidine 2% Cloths 1 Application(s) Topical <User Schedule>  dicyclomine 10 milliGRAM(s) Oral three times a day before meals  fluPHENAZine 10 milliGRAM(s) Oral daily  fluticasone propionate/ salmeterol 500-50 MICROgram(s) Diskus 1 Dose(s) Inhalation two times a day  folic acid Injectable 1 milliGRAM(s) IV Push daily  heparin   Injectable 5000 Unit(s) SubCutaneous every 8 hours  hydrALAZINE Injectable 10 milliGRAM(s) IV Push three times a day  lactulose Syrup 20 Gram(s) Oral daily  lidocaine   4% Patch 1 Patch Transdermal daily  memantine 5 milliGRAM(s) Oral at bedtime  methylPREDNISolone sodium succinate Injectable 40 milliGRAM(s) IV Push every 24 hours  nicotine -  14 mG/24Hr(s) Patch 1 Patch Transdermal daily  OLANZapine 20 milliGRAM(s) Oral at bedtime  pantoprazole  Injectable 40 milliGRAM(s) IV Push with breakfast  piperacillin/tazobactam IVPB.. 3.375 Gram(s) IV Intermittent every 8 hours  polyethylene glycol 3350 17 Gram(s) Oral two times a day  senna 2 Tablet(s) Oral at bedtime  tamsulosin 0.4 milliGRAM(s) Oral at bedtime  thiamine Injectable 100 milliGRAM(s) IV Push daily    MEDICATIONS  (PRN):  albuterol/ipratropium for Nebulization 3 milliLiter(s) Nebulizer every 6 hours PRN Shortness of Breath and/or Wheezing  cyclobenzaprine 5 milliGRAM(s) Oral three times a day PRN Muscle Spasm  LORazepam   Injectable 0.5 milliGRAM(s) IV Push every 8 hours PRN Agitation/anxiety  magnesium hydroxide Suspension 30 milliLiter(s) Oral daily PRN Constipation  melatonin 3 milliGRAM(s) Oral at bedtime PRN Insomnia  metoclopramide Injectable 10 milliGRAM(s) IV Push three times a day PRN nausea  ondansetron Injectable 4 milliGRAM(s) IV Push every 8 hours PRN Nausea and/or Vomiting  oxycodone    5 mG/acetaminophen 325 mG 1 Tablet(s) Oral every 6 hours PRN Severe Pain (7 - 10)      New X-rays reviewed:                                                                                  ECHO    CXR interpreted by me:

## 2025-06-26 NOTE — PROGRESS NOTE ADULT - ASSESSMENT
78 yo male with a history of HTN, schizophrenia, dementia, and MDD presented to the ED due to sudden left-sided flank pain accompanied by SOB and cough that seems to exacerbate the pain. He is a current smoker. He developed AHRF associated with aspiration pneumonia and suspected COPD exacerbation. He is now on LFNC.    #Acute hypoxemic respiratory failure  #Aspiration pneumonia  #COPD exacerbation  #Left flank pain secondary to possible radiculopathy  #Constipation  - CTA Chest 6/17: Negative for pulmonary emboli. Emphysematous changes. Bibasilar atelectasis. Areas of ground glass attenuation which could reflect air trapping/airway disease.  - CT A/P 6/17: no acute abdominal findings  - CXR 6/24: B/L lower lobe opacities  - CT A/P 6/24: No significant change from recent CT. No CT evidence of acute abdominal pathology. B/L basilar consolidation secondary to aspiration.  - CT chest 6/24: B/L consolidations/atelectasis suggestive of aspiration and fracture of left 10th rib (likely causing his flank pain)  - Lipase wnl  - MRSA negative, vancomycin was d/c.   - C/w zosyn due to previous aspiration event   - Pulmonary following: suggest aspiration precautions, HOB 45 degrees, solumedrol 40mg IV and then prednisone taper starting tomorrow, nebs q6hrs prn, keep on LFNC with HFNC on standby, BIPAP if having severe coughing fits, IPV, chest PT, incentive spirometry, repeat CXR  - Feeding per speech/swallow eval  - C/w lactulose, miralax, senna  - C/w pain relief regimen (lidocaine patch, oxycodone, Flexeril)  - Reglan ppx prn for nausea  - ID following: c/w Zosyn until 6/27  - Swallow FEES performed: mild oropharyngeal dysphagia, recommended consistency minced and moist with thin liquids  - PT eval      #Acute on chronic renal failure  #Lactic acidosis  - Elevated Cr at 2.1 and BUN at 22, eGFR 32  - Lactate remains elevated at 2.8, repeat ABGs  - C/w Flomax  - Monitor BMP and correct as needed  - Monitor I&Os      #HTN, schizophrenia, dementia, MDD  - C/w amlodipine, hydralazine, psych meds    #MISC  -DVT ppx: heparin SQ  -GI ppx: protonix  -DIET: DASH/TLC  -Activity: AAT  -Code Status: Full   -DIspo: SDU    -Pending: PT, prednisone taper, IPV, chest PT, continue pain assessment, keep on LFNC and wean as tolerated, monitor BMP, finish Zosyn course

## 2025-06-26 NOTE — SWALLOW BEDSIDE ASSESSMENT ADULT - SWALLOW EVAL: CURRENT DIET
minced and moist with thins
was SBS then briefly made NPO this morning pending speech
soft and bite size with mildly thick liquids
NPO pending speech

## 2025-06-26 NOTE — PROGRESS NOTE ADULT - ATTENDING COMMENTS
IMPRESSION:    Acute hypoxemic respiratory failure  COPD exacerbation  Aspiration PNA w/ recurrent aspiration events  Acute on chronic renal failure  Lactic acidosis improved  HO HTN, MDD, schizophrenia, dementia       Plan as outlined above

## 2025-06-26 NOTE — PROGRESS NOTE ADULT - ASSESSMENT
IMPRESSION:    Acute hypoxemic respiratory failure.    COPD exacerbation  Aspiration PNA w/ recurrent aspiration events  Acute on chronic renal failure  Lactic acidosis improved  HO HTN, MDD, schizophrenia, dementia     PLAN:    CNS: Avoid CNS depressant.  CW OP meds.    HEENT:  Oral care    PULMONARY:  HOB @ 45 degrees.  Aspiration precaution.  NIV during sleep and PRN during the day.  Wean O2 as tolerated.  Keep POX target 92-96%.  Solumedrol 40mg q24.  Nebs q6 PRN.  CT chest NC noted w/ BL consolidations / atelectasis.  Chest PT.  IPV.  Incentive spirometry    CARDIOVASCULAR: TTE noted nl EF.  BP control.  Equal balance.  Lactate improved    GI: GI prophylaxis.  Prokinetic PRN.  Feeding as tolerated per speech.  Surgery eval noted.  CT Abd noted.    RENAL:  F/u  lytes.  Correct as needed.  Strict Is and Os.      INFECTIOUS DISEASE:  Finish Zosyn course.  FU Cx.  MRSA nares negative.    HEMATOLOGICAL:  DVT prophylaxis.  LE doppler negative.    ENDOCRINE:  Follow up FS.  Insulin protocol if needed.    Full code    SDU   IMPRESSION:    Acute hypoxemic respiratory failure  COPD exacerbation  Aspiration PNA w/ recurrent aspiration events  Acute on chronic renal failure  Lactic acidosis improved  HO HTN, MDD, schizophrenia, dementia     PLAN:    CNS: Avoid CNS depressant.  CW OP meds.    HEENT:  Oral care    PULMONARY:  HOB @ 45 degrees.  Aspiration precaution.  NIV during sleep and PRN during the day.  Wean O2 as tolerated.  Keep POX target 92-96%.  Solumedrol 40mg q24 - Pred taper starting tomorrow.  Nebs q6 PRN.  CT chest NC noted w/ BL consolidations / atelectasis.  Chest PT.  IPV.  Incentive spirometry.  Trial of LFNC today.    CARDIOVASCULAR: TTE noted nl EF.  BP control.  Equal balance.  Lactate improved    GI: GI prophylaxis.  Prokinetic PRN.  Feeding as tolerated per speech.  Surgery eval noted.  CT Abd noted.    RENAL:  F/u  lytes.  Correct as needed.  Strict Is and Os.      INFECTIOUS DISEASE:  Finish Zosyn course.  FU Cx.  MRSA nares negative.    HEMATOLOGICAL:  DVT prophylaxis.  LE doppler negative.    ENDOCRINE:  Follow up FS.  Insulin protocol if needed.    Full code    DGTF if remains stable on LFNC

## 2025-06-26 NOTE — SWALLOW BEDSIDE ASSESSMENT ADULT - SWALLOW EVAL: FEEDING ASSISTANCE
pt must be 1:1 feed in order to consistently cue for small bites/sips./dependent
cues for small bites and sips to facilitate pacing/set up only required
no assistance needed
pt must be 1:1 feed in order to consistently cue for small bites/sips./dependent

## 2025-06-26 NOTE — SWALLOW BEDSIDE ASSESSMENT ADULT - NS SPL SWALLOW CLINIC TRIAL FT
+ toleration observed without overt symptoms of penetration/aspiration
functional swallow for puree, soft, and thin liquids via small bites and sips. oral impairment with chewables due to missing dentition and some generalized weakness due to hospitalization

## 2025-06-27 LAB
ALBUMIN SERPL ELPH-MCNC: 3.8 G/DL — SIGNIFICANT CHANGE UP (ref 3.5–5.2)
ALP SERPL-CCNC: 70 U/L — SIGNIFICANT CHANGE UP (ref 30–115)
ALT FLD-CCNC: 26 U/L — SIGNIFICANT CHANGE UP (ref 0–41)
ANION GAP SERPL CALC-SCNC: 13 MMOL/L — SIGNIFICANT CHANGE UP (ref 7–14)
AST SERPL-CCNC: 27 U/L — SIGNIFICANT CHANGE UP (ref 0–41)
BASOPHILS # BLD AUTO: 0.06 K/UL — SIGNIFICANT CHANGE UP (ref 0–0.2)
BASOPHILS NFR BLD AUTO: 0.4 % — SIGNIFICANT CHANGE UP (ref 0–1)
BILIRUB SERPL-MCNC: 0.4 MG/DL — SIGNIFICANT CHANGE UP (ref 0.2–1.2)
BUN SERPL-MCNC: 24 MG/DL — HIGH (ref 10–20)
CALCIUM SERPL-MCNC: 8.2 MG/DL — LOW (ref 8.4–10.5)
CHLORIDE SERPL-SCNC: 106 MMOL/L — SIGNIFICANT CHANGE UP (ref 98–110)
CO2 SERPL-SCNC: 21 MMOL/L — SIGNIFICANT CHANGE UP (ref 17–32)
CREAT SERPL-MCNC: 2.2 MG/DL — HIGH (ref 0.7–1.5)
CULTURE RESULTS: SIGNIFICANT CHANGE UP
EGFR: 30 ML/MIN/1.73M2 — LOW
EGFR: 30 ML/MIN/1.73M2 — LOW
EOSINOPHIL # BLD AUTO: 0.11 K/UL — SIGNIFICANT CHANGE UP (ref 0–0.7)
EOSINOPHIL NFR BLD AUTO: 0.7 % — SIGNIFICANT CHANGE UP (ref 0–8)
GLUCOSE SERPL-MCNC: 105 MG/DL — HIGH (ref 70–99)
HCT VFR BLD CALC: 38.1 % — LOW (ref 42–52)
HGB BLD-MCNC: 12.7 G/DL — LOW (ref 14–18)
IMM GRANULOCYTES NFR BLD AUTO: 3.5 % — HIGH (ref 0.1–0.3)
LYMPHOCYTES # BLD AUTO: 18.7 % — LOW (ref 20.5–51.1)
LYMPHOCYTES # BLD AUTO: 2.91 K/UL — SIGNIFICANT CHANGE UP (ref 1.2–3.4)
MAGNESIUM SERPL-MCNC: 2.1 MG/DL — SIGNIFICANT CHANGE UP (ref 1.8–2.4)
MCHC RBC-ENTMCNC: 29.9 PG — SIGNIFICANT CHANGE UP (ref 27–31)
MCHC RBC-ENTMCNC: 33.3 G/DL — SIGNIFICANT CHANGE UP (ref 32–37)
MCV RBC AUTO: 89.6 FL — SIGNIFICANT CHANGE UP (ref 80–94)
MONOCYTES # BLD AUTO: 1.19 K/UL — HIGH (ref 0.1–0.6)
MONOCYTES NFR BLD AUTO: 7.6 % — SIGNIFICANT CHANGE UP (ref 1.7–9.3)
NEUTROPHILS # BLD AUTO: 10.78 K/UL — HIGH (ref 1.4–6.5)
NEUTROPHILS NFR BLD AUTO: 69.1 % — SIGNIFICANT CHANGE UP (ref 42.2–75.2)
NRBC BLD AUTO-RTO: 0 /100 WBCS — SIGNIFICANT CHANGE UP (ref 0–0)
PLATELET # BLD AUTO: 191 K/UL — SIGNIFICANT CHANGE UP (ref 130–400)
PMV BLD: 10.8 FL — HIGH (ref 7.4–10.4)
POTASSIUM SERPL-MCNC: 3.9 MMOL/L — SIGNIFICANT CHANGE UP (ref 3.5–5)
POTASSIUM SERPL-SCNC: 3.9 MMOL/L — SIGNIFICANT CHANGE UP (ref 3.5–5)
PROT SERPL-MCNC: 5.8 G/DL — LOW (ref 6–8)
RBC # BLD: 4.25 M/UL — LOW (ref 4.7–6.1)
RBC # FLD: 13.2 % — SIGNIFICANT CHANGE UP (ref 11.5–14.5)
SODIUM SERPL-SCNC: 140 MMOL/L — SIGNIFICANT CHANGE UP (ref 135–146)
SPECIMEN SOURCE: SIGNIFICANT CHANGE UP
WBC # BLD: 15.6 K/UL — HIGH (ref 4.8–10.8)
WBC # FLD AUTO: 15.6 K/UL — HIGH (ref 4.8–10.8)

## 2025-06-27 PROCEDURE — 71045 X-RAY EXAM CHEST 1 VIEW: CPT | Mod: 26

## 2025-06-27 PROCEDURE — 99233 SBSQ HOSP IP/OBS HIGH 50: CPT

## 2025-06-27 RX ADMIN — OLANZAPINE 5 MILLIGRAM(S): 10 TABLET ORAL at 21:27

## 2025-06-27 RX ADMIN — Medication 1 DOSE(S): at 08:28

## 2025-06-27 RX ADMIN — Medication 25 MILLIGRAM(S): at 21:28

## 2025-06-27 RX ADMIN — NICOTINE POLACRILEX 1 PATCH: 4 GUM, CHEWING ORAL at 11:13

## 2025-06-27 RX ADMIN — IPRATROPIUM BROMIDE AND ALBUTEROL SULFATE 3 MILLILITER(S): .5; 2.5 SOLUTION RESPIRATORY (INHALATION) at 20:23

## 2025-06-27 RX ADMIN — Medication 1 APPLICATION(S): at 05:33

## 2025-06-27 RX ADMIN — Medication 10 MILLIGRAM(S): at 11:05

## 2025-06-27 RX ADMIN — Medication 10 MILLIGRAM(S): at 06:12

## 2025-06-27 RX ADMIN — BUPROPION HYDROBROMIDE 150 MILLIGRAM(S): 522 TABLET, EXTENDED RELEASE ORAL at 11:05

## 2025-06-27 RX ADMIN — Medication 1 DOSE(S): at 21:32

## 2025-06-27 RX ADMIN — Medication 1 TABLET(S): at 21:31

## 2025-06-27 RX ADMIN — HEPARIN SODIUM 5000 UNIT(S): 1000 INJECTION INTRAVENOUS; SUBCUTANEOUS at 13:13

## 2025-06-27 RX ADMIN — Medication 10 MILLIGRAM(S): at 17:03

## 2025-06-27 RX ADMIN — HEPARIN SODIUM 5000 UNIT(S): 1000 INJECTION INTRAVENOUS; SUBCUTANEOUS at 05:30

## 2025-06-27 RX ADMIN — TAMSULOSIN HYDROCHLORIDE 0.4 MILLIGRAM(S): 0.4 CAPSULE ORAL at 21:32

## 2025-06-27 RX ADMIN — Medication 25 MILLIGRAM(S): at 05:32

## 2025-06-27 RX ADMIN — FOLIC ACID 1 MILLIGRAM(S): 1 TABLET ORAL at 11:06

## 2025-06-27 RX ADMIN — Medication 25 GRAM(S): at 13:13

## 2025-06-27 RX ADMIN — HEPARIN SODIUM 5000 UNIT(S): 1000 INJECTION INTRAVENOUS; SUBCUTANEOUS at 21:27

## 2025-06-27 RX ADMIN — AMLODIPINE BESYLATE 10 MILLIGRAM(S): 10 TABLET ORAL at 05:31

## 2025-06-27 RX ADMIN — NICOTINE POLACRILEX 1 PATCH: 4 GUM, CHEWING ORAL at 06:14

## 2025-06-27 RX ADMIN — ATORVASTATIN CALCIUM 40 MILLIGRAM(S): 80 TABLET, FILM COATED ORAL at 21:31

## 2025-06-27 RX ADMIN — PREDNISONE 40 MILLIGRAM(S): 20 TABLET ORAL at 05:31

## 2025-06-27 RX ADMIN — Medication 25 GRAM(S): at 05:30

## 2025-06-27 RX ADMIN — Medication 25 GRAM(S): at 21:27

## 2025-06-27 RX ADMIN — LIDOCAINE HYDROCHLORIDE 1 PATCH: 20 JELLY TOPICAL at 11:07

## 2025-06-27 RX ADMIN — LACTULOSE 20 GRAM(S): 10 SOLUTION ORAL at 11:05

## 2025-06-27 RX ADMIN — MEMANTINE HYDROCHLORIDE 5 MILLIGRAM(S): 21 CAPSULE, EXTENDED RELEASE ORAL at 21:28

## 2025-06-27 RX ADMIN — POLYETHYLENE GLYCOL 3350 17 GRAM(S): 17 POWDER, FOR SOLUTION ORAL at 05:31

## 2025-06-27 RX ADMIN — Medication 40 MILLIGRAM(S): at 08:20

## 2025-06-27 RX ADMIN — NICOTINE POLACRILEX 1 PATCH: 4 GUM, CHEWING ORAL at 11:06

## 2025-06-27 RX ADMIN — Medication 100 MILLIGRAM(S): at 11:05

## 2025-06-27 RX ADMIN — Medication 25 MILLIGRAM(S): at 13:13

## 2025-06-27 NOTE — PROGRESS NOTE ADULT - SUBJECTIVE AND OBJECTIVE BOX
Patient is a 79y old  Male who presents with a chief complaint of SOB (26 Jun 2025 16:13)      Over Night Events:        ROS:     All ROS are negative except HPI         PHYSICAL EXAM    ICU Vital Signs Last 24 Hrs  T(C): 35.4 (27 Jun 2025 07:28), Max: 36.6 (27 Jun 2025 05:37)  T(F): 95.7 (27 Jun 2025 07:28), Max: 97.8 (27 Jun 2025 05:37)  HR: 72 (27 Jun 2025 07:28) (69 - 88)  BP: 117/57 (27 Jun 2025 07:28) (113/64 - 141/64)  BP(mean): 80 (27 Jun 2025 07:28) (80 - 87)  ABP: --  ABP(mean): --  RR: 18 (27 Jun 2025 07:28) (17 - 18)  SpO2: 94% (27 Jun 2025 07:28) (94% - 99%)    O2 Parameters below as of 27 Jun 2025 05:37  Patient On (Oxygen Delivery Method): nasal cannula  O2 Flow (L/min): 2          CONSTITUTIONAL:  NAD    ENT:   Airway patent,   Mouth with normal mucosa.   No thrush    EYES:   Pupils equal,   Round and reactive to light.    CARDIAC:   Normal rate,   Regular rhythm.    No edema    RESPIRATORY:   No wheezing  Bilateral BS  Normal chest expansion  Not tachypneic,  No use of accessory muscles    GASTROINTESTINAL:  Abdomen soft,   Non-tender,   No guarding,   + BS    MUSCULOSKELETAL:   Range of motion is not limited,  No clubbing, cyanosis    NEUROLOGICAL:   Alert and oriented   No motor  deficits.    SKIN:   Skin normal color for race,   Warm and dry and intact.   No evidence of rash.        06-26-25 @ 07:01  -  06-27-25 @ 07:00  --------------------------------------------------------  IN:    Oral Fluid: 440 mL  Total IN: 440 mL    OUT:    Voided (mL): 2550 mL  Total OUT: 2550 mL    Total NET: -2110 mL      06-27-25 @ 07:01  -  06-27-25 @ 07:58  --------------------------------------------------------  IN:  Total IN: 0 mL    OUT:    Voided (mL): 300 mL  Total OUT: 300 mL    Total NET: -300 mL          LABS:                            12.7   15.60 )-----------( 191      ( 27 Jun 2025 05:41 )             38.1                                               06-27    140  |  106  |  24[H]  ----------------------------<  105[H]  3.9   |  21  |  2.2[H]    Ca    8.2[L]      27 Jun 2025 05:41  Mg     2.1     06-27    TPro  5.8[L]  /  Alb  3.8  /  TBili  0.4  /  DBili  x   /  AST  27  /  ALT  26  /  AlkPhos  70  06-27                                             Urinalysis Basic - ( 27 Jun 2025 05:41 )    Color: x / Appearance: x / SG: x / pH: x  Gluc: 105 mg/dL / Ketone: x  / Bili: x / Urobili: x   Blood: x / Protein: x / Nitrite: x   Leuk Esterase: x / RBC: x / WBC x   Sq Epi: x / Non Sq Epi: x / Bacteria: x                                                  LIVER FUNCTIONS - ( 27 Jun 2025 05:41 )  Alb: 3.8 g/dL / Pro: 5.8 g/dL / ALK PHOS: 70 U/L / ALT: 26 U/L / AST: 27 U/L / GGT: x                                                                                                                                       MEDICATIONS  (STANDING):  albuterol    90 MICROgram(s) HFA Inhaler 2 Puff(s) Inhalation two times a day  amLODIPine   Tablet 10 milliGRAM(s) Oral daily  atorvastatin 40 milliGRAM(s) Oral at bedtime  buPROPion XL (24-Hour) . 150 milliGRAM(s) Oral daily  chlorhexidine 2% Cloths 1 Application(s) Topical <User Schedule>  dicyclomine 10 milliGRAM(s) Oral three times a day before meals  fluPHENAZine 10 milliGRAM(s) Oral daily  fluticasone propionate/ salmeterol 500-50 MICROgram(s) Diskus 1 Dose(s) Inhalation two times a day  folic acid 1 milliGRAM(s) Oral daily  heparin   Injectable 5000 Unit(s) SubCutaneous every 8 hours  hydrALAZINE 25 milliGRAM(s) Oral three times a day  lactulose Syrup 20 Gram(s) Oral daily  lidocaine   4% Patch 1 Patch Transdermal daily  memantine 5 milliGRAM(s) Oral at bedtime  nicotine -  14 mG/24Hr(s) Patch 1 Patch Transdermal daily  OLANZapine 20 milliGRAM(s) Oral at bedtime  pantoprazole  Injectable 40 milliGRAM(s) IV Push with breakfast  piperacillin/tazobactam IVPB.. 3.375 Gram(s) IV Intermittent every 8 hours  polyethylene glycol 3350 17 Gram(s) Oral two times a day  predniSONE   Tablet   Oral   senna 2 Tablet(s) Oral at bedtime  tamsulosin 0.4 milliGRAM(s) Oral at bedtime  thiamine Injectable 100 milliGRAM(s) IV Push daily    MEDICATIONS  (PRN):  albuterol/ipratropium for Nebulization 3 milliLiter(s) Nebulizer every 6 hours PRN Shortness of Breath and/or Wheezing  cyclobenzaprine 5 milliGRAM(s) Oral three times a day PRN Muscle Spasm  LORazepam   Injectable 0.5 milliGRAM(s) IV Push every 8 hours PRN Agitation/anxiety  magnesium hydroxide Suspension 30 milliLiter(s) Oral daily PRN Constipation  melatonin 3 milliGRAM(s) Oral at bedtime PRN Insomnia  ondansetron Injectable 4 milliGRAM(s) IV Push every 8 hours PRN Nausea and/or Vomiting  oxycodone    5 mG/acetaminophen 325 mG 1 Tablet(s) Oral every 6 hours PRN Severe Pain (7 - 10)      New X-rays reviewed:                                                                                  ECHO    CXR interpreted by me:       Patient is a 79y old  Male who presents with a chief complaint of SOB (26 Jun 2025 16:13)      Over Night Events: Transitioned from HFNC to LFNC 2L.        ROS:     All ROS are negative except HPI         PHYSICAL EXAM    ICU Vital Signs Last 24 Hrs  T(C): 35.4 (27 Jun 2025 07:28), Max: 36.6 (27 Jun 2025 05:37)  T(F): 95.7 (27 Jun 2025 07:28), Max: 97.8 (27 Jun 2025 05:37)  HR: 72 (27 Jun 2025 07:28) (69 - 88)  BP: 117/57 (27 Jun 2025 07:28) (113/64 - 141/64)  BP(mean): 80 (27 Jun 2025 07:28) (80 - 87)  ABP: --  ABP(mean): --  RR: 18 (27 Jun 2025 07:28) (17 - 18)  SpO2: 94% (27 Jun 2025 07:28) (94% - 99%)    O2 Parameters below as of 27 Jun 2025 05:37  Patient On (Oxygen Delivery Method): nasal cannula  O2 Flow (L/min): 2          CONSTITUTIONAL:  NAD    ENT:   Airway patent,   Mouth with normal mucosa.   No thrush    EYES:   Pupils equal,   Round and reactive to light.    CARDIAC:   Normal rate,   Regular rhythm.    No edema    RESPIRATORY:   No wheezing  Bilateral BS  Normal chest expansion  Not tachypneic,  No use of accessory muscles    GASTROINTESTINAL:  Abdomen soft,   Non-tender    MUSCULOSKELETAL:   Range of motion is not limited,  No clubbing, cyanosis    NEUROLOGICAL:   Alert  No motor  deficits.    SKIN:   Skin normal color for race,   Warm and dry and intact        06-26-25 @ 07:01  -  06-27-25 @ 07:00  --------------------------------------------------------  IN:    Oral Fluid: 440 mL  Total IN: 440 mL    OUT:    Voided (mL): 2550 mL  Total OUT: 2550 mL    Total NET: -2110 mL      06-27-25 @ 07:01  -  06-27-25 @ 07:58  --------------------------------------------------------  IN:  Total IN: 0 mL    OUT:    Voided (mL): 300 mL  Total OUT: 300 mL    Total NET: -300 mL          LABS:                            12.7   15.60 )-----------( 191      ( 27 Jun 2025 05:41 )             38.1                                               06-27    140  |  106  |  24[H]  ----------------------------<  105[H]  3.9   |  21  |  2.2[H]    Ca    8.2[L]      27 Jun 2025 05:41  Mg     2.1     06-27    TPro  5.8[L]  /  Alb  3.8  /  TBili  0.4  /  DBili  x   /  AST  27  /  ALT  26  /  AlkPhos  70  06-27                                             Urinalysis Basic - ( 27 Jun 2025 05:41 )    Color: x / Appearance: x / SG: x / pH: x  Gluc: 105 mg/dL / Ketone: x  / Bili: x / Urobili: x   Blood: x / Protein: x / Nitrite: x   Leuk Esterase: x / RBC: x / WBC x   Sq Epi: x / Non Sq Epi: x / Bacteria: x                                                  LIVER FUNCTIONS - ( 27 Jun 2025 05:41 )  Alb: 3.8 g/dL / Pro: 5.8 g/dL / ALK PHOS: 70 U/L / ALT: 26 U/L / AST: 27 U/L / GGT: x                                                                                                                                       MEDICATIONS  (STANDING):  albuterol    90 MICROgram(s) HFA Inhaler 2 Puff(s) Inhalation two times a day  amLODIPine   Tablet 10 milliGRAM(s) Oral daily  atorvastatin 40 milliGRAM(s) Oral at bedtime  buPROPion XL (24-Hour) . 150 milliGRAM(s) Oral daily  chlorhexidine 2% Cloths 1 Application(s) Topical <User Schedule>  dicyclomine 10 milliGRAM(s) Oral three times a day before meals  fluPHENAZine 10 milliGRAM(s) Oral daily  fluticasone propionate/ salmeterol 500-50 MICROgram(s) Diskus 1 Dose(s) Inhalation two times a day  folic acid 1 milliGRAM(s) Oral daily  heparin   Injectable 5000 Unit(s) SubCutaneous every 8 hours  hydrALAZINE 25 milliGRAM(s) Oral three times a day  lactulose Syrup 20 Gram(s) Oral daily  lidocaine   4% Patch 1 Patch Transdermal daily  memantine 5 milliGRAM(s) Oral at bedtime  nicotine -  14 mG/24Hr(s) Patch 1 Patch Transdermal daily  OLANZapine 20 milliGRAM(s) Oral at bedtime  pantoprazole  Injectable 40 milliGRAM(s) IV Push with breakfast  piperacillin/tazobactam IVPB.. 3.375 Gram(s) IV Intermittent every 8 hours  polyethylene glycol 3350 17 Gram(s) Oral two times a day  predniSONE   Tablet   Oral   senna 2 Tablet(s) Oral at bedtime  tamsulosin 0.4 milliGRAM(s) Oral at bedtime  thiamine Injectable 100 milliGRAM(s) IV Push daily    MEDICATIONS  (PRN):  albuterol/ipratropium for Nebulization 3 milliLiter(s) Nebulizer every 6 hours PRN Shortness of Breath and/or Wheezing  cyclobenzaprine 5 milliGRAM(s) Oral three times a day PRN Muscle Spasm  LORazepam   Injectable 0.5 milliGRAM(s) IV Push every 8 hours PRN Agitation/anxiety  magnesium hydroxide Suspension 30 milliLiter(s) Oral daily PRN Constipation  melatonin 3 milliGRAM(s) Oral at bedtime PRN Insomnia  ondansetron Injectable 4 milliGRAM(s) IV Push every 8 hours PRN Nausea and/or Vomiting  oxycodone    5 mG/acetaminophen 325 mG 1 Tablet(s) Oral every 6 hours PRN Severe Pain (7 - 10)      New X-rays reviewed:                                                                                  ECHO    CXR interpreted by me:

## 2025-06-27 NOTE — PROGRESS NOTE ADULT - ASSESSMENT
IMPRESSION:    Acute hypoxemic respiratory failure  COPD exacerbation  Aspiration PNA w/ recurrent aspiration events  Acute on chronic renal failure  Lactic acidosis improved  HO HTN, MDD, schizophrenia, dementia     PLAN:    CNS: Avoid CNS depressant.  CW OP meds.    HEENT:  Oral care    PULMONARY:  HOB @ 45 degrees.  Aspiration precaution.  NIV during sleep and PRN during the day.  Wean O2 as tolerated.  Keep POX target 92-96%.  Solumedrol 40mg q24 - Pred taper starting tomorrow.  Nebs q6 PRN.  CT chest NC noted w/ BL consolidations / atelectasis.  Chest PT.  IPV.  Incentive spirometry.  Trial of LFNC today.    CARDIOVASCULAR: TTE noted nl EF.  BP control.  Equal balance.  Lactate improved    GI: GI prophylaxis.  Prokinetic PRN.  Feeding as tolerated per speech.  Surgery eval noted.  CT Abd noted.    RENAL:  F/u  lytes.  Correct as needed.  Strict Is and Os.      INFECTIOUS DISEASE:  Finish Zosyn course.  FU Cx.  MRSA nares negative.    HEMATOLOGICAL:  DVT prophylaxis.  LE doppler negative.    ENDOCRINE:  Follow up FS.  Insulin protocol if needed.    Full code    DGTF if remains stable on LFNC IMPRESSION:    Acute hypoxemic respiratory failure - improved  COPD exacerbation  Aspiration PNA w/ recurrent aspiration events  Acute on chronic renal failure  Lactic acidosis improved  HO HTN, MDD, schizophrenia, dementia     PLAN:    CNS: Avoid CNS depressant.  CW OP meds.    HEENT:  Oral care    PULMONARY:  HOB @ 45 degrees.  Aspiration precaution.  NIV during sleep and PRN during the day.  Wean O2 as tolerated.  Keep POX target 92-96%.  Pred taper.  Nebs q6 PRN.  CT chest NC noted w/ BL consolidations / atelectasis.  Chest PT.  IPV q8.  Incentive spirometry.    CARDIOVASCULAR: TTE noted nl EF.  BP control.  Equal balance.  Lactate improved    GI: GI prophylaxis.  Prokinetic PRN.  Feeding as tolerated per speech.  Surgery eval noted.  CT Abd noted.    RENAL:  F/u  lytes.  Correct as needed.  Strict Is and Os.      INFECTIOUS DISEASE:  Finish Zosyn course today.  FU Cx.  MRSA nares negative.    HEMATOLOGICAL:  DVT prophylaxis.  LE doppler negative.    ENDOCRINE:  Follow up FS.  Insulin protocol if needed.    Full code    DGTF

## 2025-06-27 NOTE — PROGRESS NOTE ADULT - ASSESSMENT
ANILA HORTON (79y Male) with PMH of HTN, schizophrenia, dementia, and MDD presented to the ED due to sudden left-sided flank pain accompanied by SOB and cough that seems to exacerbate the pain. He is a current smoker. He developed AHRF associated with aspiration pneumonia and suspected COPD exacerbation.    #Acute hypoxemic respiratory failure  #Aspiration pneumonia  #COPD exacerbation  #Left flank pain due to left 10th rib fracture  - CTA Chest 6/17: Negative for pulmonary emboli. Emphysematous changes. Bibasilar atelectasis. Areas of ground glass attenuation which could reflect air trapping/airway disease.  - CT A/P 6/17: no acute abdominal findings  - CT A/P 6/24: No significant change from recent CT. No CT evidence of acute abdominal pathology. B/L basilar consolidation secondary to aspiration.  - CT chest 6/24: B/L consolidations/atelectasis suggestive of aspiration and fracture of left 10th rib (likely causing his flank pain)  - Lipase wnl  - MRSA negative, vancomycin was d/c.   - ID following: c/w Zosyn until 6/27  - PO prednisone taper, nebs q6hrs prn, keep on LFNC with HFNC on standby, BIPAP if having severe coughing fits, IPV, chest PT, incentive spirometry, repeat CXR  - C/w pain relief regimen (lidocaine patch, oxycodone, Flexeril)    # Dysphagia   - Swallow FEES performed: mild oropharyngeal dysphagia, recommended consistency minced and moist with thin liquids    # CKD  -S.Cr at baseline     #HTN,  - C/w amlodipine, hydralazine    # schizophrenia, dementia, MDD  mood stable on psych meds    # Prophylactic measures   - DVT PPx: heparin  - GI PPx: PPI    Physician Handoff  - Monitor respiratory status. Trend WBC/fever curve.     35 minutes spent on total encounter. The necessity of the time spent during the encounter on this date of service was due to: time spent on review of labs, imaging studies, old records, obtaining history, personally examining patient, entering orders for medications/tests/etc, discussions with other health care providers, documentation in electronic health records, independent interpretation of labs, imaging/procedure results and care coordination.

## 2025-06-27 NOTE — PROGRESS NOTE ADULT - ATTENDING COMMENTS
PAST SURGICAL HISTORY:  No significant past surgical history      Impression and plan above have been altered and are my own.

## 2025-06-27 NOTE — PROGRESS NOTE ADULT - SUBJECTIVE AND OBJECTIVE BOX
SUBJECTIVE:   ANILA HORTON (79y Male) was seen at bedside.     PHYSICAL EXAM:  General: not in distress, ill-appearing  Lungs:  bilateral clear air entry, no wheezing, no crackles   Heart: S1, S2, no murmur   Abdomen: soft, non tender, non distended  Neuro: AAOx3, no focal deficits  Extremity: No edema, cyanosis.    Vital Signs Last 24 Hrs  T(C): 36.3 (27 Jun 2025 20:32), Max: 36.6 (27 Jun 2025 05:37)  T(F): 97.4 (27 Jun 2025 20:32), Max: 97.8 (27 Jun 2025 05:37)  HR: 72 (27 Jun 2025 07:28) (69 - 84)  BP: 130/60 (27 Jun 2025 20:32) (117/57 - 130/70)  BP(mean): 86 (27 Jun 2025 20:32) (80 - 92)  RR: 18 (27 Jun 2025 20:32) (18 - 18)  SpO2: 97% (27 Jun 2025 20:32) (94% - 98%)    Parameters below as of 27 Jun 2025 20:32  Patient On (Oxygen Delivery Method): nasal cannula  O2 Flow (L/min): 2      MEDICATIONS  (STANDING):  albuterol    90 MICROgram(s) HFA Inhaler 2 Puff(s) Inhalation two times a day  amLODIPine   Tablet 10 milliGRAM(s) Oral daily  atorvastatin 40 milliGRAM(s) Oral at bedtime  buPROPion XL (24-Hour) . 150 milliGRAM(s) Oral daily  chlorhexidine 2% Cloths 1 Application(s) Topical <User Schedule>  dicyclomine 10 milliGRAM(s) Oral three times a day before meals  fluPHENAZine 10 milliGRAM(s) Oral daily  fluticasone propionate/ salmeterol 500-50 MICROgram(s) Diskus 1 Dose(s) Inhalation two times a day  folic acid 1 milliGRAM(s) Oral daily  heparin   Injectable 5000 Unit(s) SubCutaneous every 8 hours  hydrALAZINE 25 milliGRAM(s) Oral three times a day  lactulose Syrup 20 Gram(s) Oral daily  lidocaine   4% Patch 1 Patch Transdermal daily  memantine 5 milliGRAM(s) Oral at bedtime  nicotine -  14 mG/24Hr(s) Patch 1 Patch Transdermal daily  OLANZapine 20 milliGRAM(s) Oral at bedtime  pantoprazole    Tablet 40 milliGRAM(s) Oral before breakfast  piperacillin/tazobactam IVPB.. 3.375 Gram(s) IV Intermittent every 8 hours  polyethylene glycol 3350 17 Gram(s) Oral two times a day  predniSONE   Tablet   Oral   senna 2 Tablet(s) Oral at bedtime  tamsulosin 0.4 milliGRAM(s) Oral at bedtime  thiamine 100 milliGRAM(s) Oral daily    MEDICATIONS  (PRN):  albuterol/ipratropium for Nebulization 3 milliLiter(s) Nebulizer every 6 hours PRN Shortness of Breath and/or Wheezing  cyclobenzaprine 5 milliGRAM(s) Oral three times a day PRN Muscle Spasm  LORazepam   Injectable 0.5 milliGRAM(s) IV Push every 8 hours PRN Agitation/anxiety  magnesium hydroxide Suspension 30 milliLiter(s) Oral daily PRN Constipation  melatonin 3 milliGRAM(s) Oral at bedtime PRN Insomnia  ondansetron Injectable 4 milliGRAM(s) IV Push every 8 hours PRN Nausea and/or Vomiting  oxycodone    5 mG/acetaminophen 325 mG 1 Tablet(s) Oral every 6 hours PRN Severe Pain (7 - 10)

## 2025-06-28 LAB
CULTURE RESULTS: SIGNIFICANT CHANGE UP
SPECIMEN SOURCE: SIGNIFICANT CHANGE UP

## 2025-06-28 PROCEDURE — 99233 SBSQ HOSP IP/OBS HIGH 50: CPT

## 2025-06-28 RX ORDER — LORAZEPAM 4 MG/ML
0.5 VIAL (ML) INJECTION EVERY 8 HOURS
Refills: 0 | Status: DISCONTINUED | OUTPATIENT
Start: 2025-06-28 | End: 2025-07-01

## 2025-06-28 RX ORDER — LORAZEPAM 4 MG/ML
0.5 VIAL (ML) INJECTION EVERY 8 HOURS
Refills: 0 | Status: DISCONTINUED | OUTPATIENT
Start: 2025-06-28 | End: 2025-06-28

## 2025-06-28 RX ADMIN — OLANZAPINE 20 MILLIGRAM(S): 10 TABLET ORAL at 22:18

## 2025-06-28 RX ADMIN — NICOTINE POLACRILEX 1 PATCH: 4 GUM, CHEWING ORAL at 07:53

## 2025-06-28 RX ADMIN — HEPARIN SODIUM 5000 UNIT(S): 1000 INJECTION INTRAVENOUS; SUBCUTANEOUS at 06:31

## 2025-06-28 RX ADMIN — LIDOCAINE HYDROCHLORIDE 1 PATCH: 20 JELLY TOPICAL at 12:36

## 2025-06-28 RX ADMIN — NICOTINE POLACRILEX 1 PATCH: 4 GUM, CHEWING ORAL at 11:40

## 2025-06-28 RX ADMIN — Medication 25 MILLIGRAM(S): at 06:30

## 2025-06-28 RX ADMIN — TAMSULOSIN HYDROCHLORIDE 0.4 MILLIGRAM(S): 0.4 CAPSULE ORAL at 22:18

## 2025-06-28 RX ADMIN — LACTULOSE 20 GRAM(S): 10 SOLUTION ORAL at 12:29

## 2025-06-28 RX ADMIN — FOLIC ACID 1 MILLIGRAM(S): 1 TABLET ORAL at 12:28

## 2025-06-28 RX ADMIN — Medication 10 MILLIGRAM(S): at 17:46

## 2025-06-28 RX ADMIN — ATORVASTATIN CALCIUM 40 MILLIGRAM(S): 80 TABLET, FILM COATED ORAL at 22:17

## 2025-06-28 RX ADMIN — Medication 10 MILLIGRAM(S): at 06:30

## 2025-06-28 RX ADMIN — Medication 100 MILLIGRAM(S): at 12:28

## 2025-06-28 RX ADMIN — Medication 10 MILLIGRAM(S): at 11:27

## 2025-06-28 RX ADMIN — Medication 1 APPLICATION(S): at 06:33

## 2025-06-28 RX ADMIN — Medication 0.5 MILLIGRAM(S): at 02:34

## 2025-06-28 RX ADMIN — PREDNISONE 30 MILLIGRAM(S): 20 TABLET ORAL at 06:30

## 2025-06-28 RX ADMIN — MEMANTINE HYDROCHLORIDE 5 MILLIGRAM(S): 21 CAPSULE, EXTENDED RELEASE ORAL at 22:18

## 2025-06-28 RX ADMIN — HEPARIN SODIUM 5000 UNIT(S): 1000 INJECTION INTRAVENOUS; SUBCUTANEOUS at 22:21

## 2025-06-28 RX ADMIN — LIDOCAINE HYDROCHLORIDE 1 PATCH: 20 JELLY TOPICAL at 00:41

## 2025-06-28 RX ADMIN — HEPARIN SODIUM 5000 UNIT(S): 1000 INJECTION INTRAVENOUS; SUBCUTANEOUS at 14:30

## 2025-06-28 RX ADMIN — Medication 10 MILLIGRAM(S): at 12:28

## 2025-06-28 RX ADMIN — POLYETHYLENE GLYCOL 3350 17 GRAM(S): 17 POWDER, FOR SOLUTION ORAL at 17:47

## 2025-06-28 RX ADMIN — LIDOCAINE HYDROCHLORIDE 1 PATCH: 20 JELLY TOPICAL at 03:36

## 2025-06-28 RX ADMIN — AMLODIPINE BESYLATE 10 MILLIGRAM(S): 10 TABLET ORAL at 06:30

## 2025-06-28 RX ADMIN — NICOTINE POLACRILEX 1 PATCH: 4 GUM, CHEWING ORAL at 12:34

## 2025-06-28 RX ADMIN — NICOTINE POLACRILEX 1 PATCH: 4 GUM, CHEWING ORAL at 19:19

## 2025-06-28 RX ADMIN — Medication 25 MILLIGRAM(S): at 14:34

## 2025-06-28 RX ADMIN — Medication 25 GRAM(S): at 14:33

## 2025-06-28 RX ADMIN — BUPROPION HYDROBROMIDE 150 MILLIGRAM(S): 522 TABLET, EXTENDED RELEASE ORAL at 12:29

## 2025-06-28 RX ADMIN — Medication 40 MILLIGRAM(S): at 06:30

## 2025-06-28 RX ADMIN — LIDOCAINE HYDROCHLORIDE 1 PATCH: 20 JELLY TOPICAL at 19:17

## 2025-06-28 RX ADMIN — Medication 25 GRAM(S): at 06:29

## 2025-06-28 RX ADMIN — Medication 25 MILLIGRAM(S): at 22:17

## 2025-06-28 NOTE — PROGRESS NOTE ADULT - SUBJECTIVE AND OBJECTIVE BOX
SUBJECTIVE:   ANILA HORTON (79y Male) was seen at bedside.     PHYSICAL EXAM:  General: not in distress, ill-appearing  Lungs:  bilateral clear air entry, no wheezing, no crackles   Heart: S1, S2, no murmur   Abdomen: soft, non tender, non distended  Neuro: AAOx3, no focal deficits  Extremity: No edema, cyanosis.    Vital Signs Last 24 Hrs  T(C): 36.4 (28 Jun 2025 15:54), Max: 36.5 (28 Jun 2025 00:00)  T(F): 97.6 (28 Jun 2025 15:54), Max: 97.7 (28 Jun 2025 00:00)  HR: 76 (28 Jun 2025 15:54) (64 - 80)  BP: 128/62 (28 Jun 2025 15:54) (92/43 - 136/63)  BP(mean): 88 (28 Jun 2025 15:54) (62 - 96)  RR: 18 (28 Jun 2025 15:54) (18 - 18)  SpO2: 93% (28 Jun 2025 15:54) (90% - 97%)    Parameters below as of 28 Jun 2025 15:54  Patient On (Oxygen Delivery Method): nasal cannula      MEDICATIONS  (STANDING):  albuterol    90 MICROgram(s) HFA Inhaler 2 Puff(s) Inhalation two times a day  amLODIPine   Tablet 10 milliGRAM(s) Oral daily  atorvastatin 40 milliGRAM(s) Oral at bedtime  buPROPion XL (24-Hour) . 150 milliGRAM(s) Oral daily  chlorhexidine 2% Cloths 1 Application(s) Topical <User Schedule>  dicyclomine 10 milliGRAM(s) Oral three times a day before meals  fluPHENAZine 10 milliGRAM(s) Oral daily  fluticasone propionate/ salmeterol 500-50 MICROgram(s) Diskus 1 Dose(s) Inhalation two times a day  folic acid 1 milliGRAM(s) Oral daily  heparin   Injectable 5000 Unit(s) SubCutaneous every 8 hours  hydrALAZINE 25 milliGRAM(s) Oral three times a day  lactulose Syrup 20 Gram(s) Oral daily  lidocaine   4% Patch 1 Patch Transdermal daily  memantine 5 milliGRAM(s) Oral at bedtime  nicotine -  14 mG/24Hr(s) Patch 1 Patch Transdermal daily  OLANZapine 20 milliGRAM(s) Oral at bedtime  pantoprazole    Tablet 40 milliGRAM(s) Oral before breakfast  polyethylene glycol 3350 17 Gram(s) Oral two times a day  predniSONE   Tablet   Oral   predniSONE   Tablet 30 milliGRAM(s) Oral daily  senna 2 Tablet(s) Oral at bedtime  tamsulosin 0.4 milliGRAM(s) Oral at bedtime  thiamine 100 milliGRAM(s) Oral daily    MEDICATIONS  (PRN):  albuterol/ipratropium for Nebulization 3 milliLiter(s) Nebulizer every 6 hours PRN Shortness of Breath and/or Wheezing  cyclobenzaprine 5 milliGRAM(s) Oral three times a day PRN Muscle Spasm  LORazepam     Tablet 0.5 milliGRAM(s) Oral every 8 hours PRN Anxiety  magnesium hydroxide Suspension 30 milliLiter(s) Oral daily PRN Constipation  melatonin 3 milliGRAM(s) Oral at bedtime PRN Insomnia  ondansetron Injectable 4 milliGRAM(s) IV Push every 8 hours PRN Nausea and/or Vomiting  oxycodone    5 mG/acetaminophen 325 mG 1 Tablet(s) Oral every 6 hours PRN Severe Pain (7 - 10)

## 2025-06-28 NOTE — PROGRESS NOTE ADULT - SUBJECTIVE AND OBJECTIVE BOX
Patient is a 79y old  Male who presents with a chief complaint of SOB (27 Jun 2025 09:00)        Over Night Events:        ROS:     All ROS are negative except HPI         PHYSICAL EXAM    ICU Vital Signs Last 24 Hrs  T(C): 36.4 (28 Jun 2025 04:00), Max: 36.5 (28 Jun 2025 00:00)  T(F): 97.5 (28 Jun 2025 04:00), Max: 97.7 (28 Jun 2025 00:00)  HR: 64 (28 Jun 2025 04:00) (64 - 79)  BP: 92/43 (28 Jun 2025 04:00) (92/43 - 136/63)  BP(mean): 62 (28 Jun 2025 04:00) (62 - 92)  ABP: --  ABP(mean): --  RR: 18 (28 Jun 2025 04:00) (18 - 18)  SpO2: 94% (28 Jun 2025 04:00) (94% - 98%)    O2 Parameters below as of 27 Jun 2025 20:32  Patient On (Oxygen Delivery Method): nasal cannula  O2 Flow (L/min): 2          CONSTITUTIONAL:  Well nourished.  NAD    ENT:   Airway patent,   Mouth with normal mucosa.   No thrush    EYES:   Pupils equal,   Round and reactive to light.    CARDIAC:   Normal rate,   Regular rhythm.    No edema      Vascular:  Normal systolic impulse  No Carotid bruits    RESPIRATORY:   No wheezing  Bilateral BS  Normal chest expansion  Not tachypneic,  No use of accessory muscles    GASTROINTESTINAL:  Abdomen soft,   Non-tender,   No guarding,   + BS    MUSCULOSKELETAL:   Range of motion is not limited,  No clubbing, cyanosis    NEUROLOGICAL:   Alert and oriented   No motor  deficits.    SKIN:   Skin normal color for race,   Warm and dry and intact.   No evidence of rash.    PSYCHIATRIC:   Normal mood and affect.   No apparent risk to self or others.    HEMATOLOGICAL:  No cervical  lymphadenopathy.  no inguinal lymphadenopathy      06-27-25 @ 07:01  -  06-28-25 @ 07:00  --------------------------------------------------------  IN:  Total IN: 0 mL    OUT:    Voided (mL): 1400 mL  Total OUT: 1400 mL    Total NET: -1400 mL          LABS:                            12.7   15.60 )-----------( 191      ( 27 Jun 2025 05:41 )             38.1                                               06-27    140  |  106  |  24[H]  ----------------------------<  105[H]  3.9   |  21  |  2.2[H]    Ca    8.2[L]      27 Jun 2025 05:41  Mg     2.1     06-27    TPro  5.8[L]  /  Alb  3.8  /  TBili  0.4  /  DBili  x   /  AST  27  /  ALT  26  /  AlkPhos  70  06-27                                             Urinalysis Basic - ( 27 Jun 2025 05:41 )    Color: x / Appearance: x / SG: x / pH: x  Gluc: 105 mg/dL / Ketone: x  / Bili: x / Urobili: x   Blood: x / Protein: x / Nitrite: x   Leuk Esterase: x / RBC: x / WBC x   Sq Epi: x / Non Sq Epi: x / Bacteria: x                                                  LIVER FUNCTIONS - ( 27 Jun 2025 05:41 )  Alb: 3.8 g/dL / Pro: 5.8 g/dL / ALK PHOS: 70 U/L / ALT: 26 U/L / AST: 27 U/L / GGT: x                                                                                                                                       MEDICATIONS  (STANDING):  albuterol    90 MICROgram(s) HFA Inhaler 2 Puff(s) Inhalation two times a day  amLODIPine   Tablet 10 milliGRAM(s) Oral daily  atorvastatin 40 milliGRAM(s) Oral at bedtime  buPROPion XL (24-Hour) . 150 milliGRAM(s) Oral daily  chlorhexidine 2% Cloths 1 Application(s) Topical <User Schedule>  dicyclomine 10 milliGRAM(s) Oral three times a day before meals  fluPHENAZine 10 milliGRAM(s) Oral daily  fluticasone propionate/ salmeterol 500-50 MICROgram(s) Diskus 1 Dose(s) Inhalation two times a day  folic acid 1 milliGRAM(s) Oral daily  heparin   Injectable 5000 Unit(s) SubCutaneous every 8 hours  hydrALAZINE 25 milliGRAM(s) Oral three times a day  lactulose Syrup 20 Gram(s) Oral daily  lidocaine   4% Patch 1 Patch Transdermal daily  memantine 5 milliGRAM(s) Oral at bedtime  nicotine -  14 mG/24Hr(s) Patch 1 Patch Transdermal daily  OLANZapine 20 milliGRAM(s) Oral at bedtime  pantoprazole    Tablet 40 milliGRAM(s) Oral before breakfast  piperacillin/tazobactam IVPB.. 3.375 Gram(s) IV Intermittent every 8 hours  polyethylene glycol 3350 17 Gram(s) Oral two times a day  predniSONE   Tablet   Oral   predniSONE   Tablet 30 milliGRAM(s) Oral daily  senna 2 Tablet(s) Oral at bedtime  tamsulosin 0.4 milliGRAM(s) Oral at bedtime  thiamine 100 milliGRAM(s) Oral daily    MEDICATIONS  (PRN):  albuterol/ipratropium for Nebulization 3 milliLiter(s) Nebulizer every 6 hours PRN Shortness of Breath and/or Wheezing  cyclobenzaprine 5 milliGRAM(s) Oral three times a day PRN Muscle Spasm  LORazepam     Tablet 0.5 milliGRAM(s) Oral every 8 hours PRN Anxiety  magnesium hydroxide Suspension 30 milliLiter(s) Oral daily PRN Constipation  melatonin 3 milliGRAM(s) Oral at bedtime PRN Insomnia  ondansetron Injectable 4 milliGRAM(s) IV Push every 8 hours PRN Nausea and/or Vomiting  oxycodone    5 mG/acetaminophen 325 mG 1 Tablet(s) Oral every 6 hours PRN Severe Pain (7 - 10)           Patient is a 79y old  Male who presents with a chief complaint of SOB and cough and abdominal pain  (27 Jun 2025 09:00)  Pneumonia   concern for diverticulitis - ruled out on CT, but found rib fractures   contaminated blood cultures  came to SDU due to req BIPAP        Over Night Events:  no events  still has some rib pain         ROS:     All ROS are negative except HPI         PHYSICAL EXAM    ICU Vital Signs Last 24 Hrs  T(C): 36.4 (28 Jun 2025 04:00), Max: 36.5 (28 Jun 2025 00:00)  T(F): 97.5 (28 Jun 2025 04:00), Max: 97.7 (28 Jun 2025 00:00)  HR: 64 (28 Jun 2025 04:00) (64 - 79)  BP: 92/43 (28 Jun 2025 04:00) (92/43 - 136/63)  BP(mean): 62 (28 Jun 2025 04:00) (62 - 92)  ABP: --  ABP(mean): --  RR: 18 (28 Jun 2025 04:00) (18 - 18)  SpO2: 94% (28 Jun 2025 04:00) (94% - 98%)    O2 Parameters below as of 27 Jun 2025 20:32  Patient On (Oxygen Delivery Method): nasal cannula  O2 Flow (L/min): 2          CONSTITUTIONAL:   NAD    ENT:   Airway patent,   Mouth with normal mucosa.   No thrush    EYES:   Pupils equal,   Round and reactive to light.    CARDIAC:   Normal rate,   Regular rhythm.    No edema        RESPIRATORY:   No wheezing  Bilateral BS  No use of accessory muscles    GASTROINTESTINAL:  Abdomen soft,   Non-tender,   No guarding,   + BS    MUSCULOSKELETAL:   Range of motion is not limited,  No clubbing, cyanosis    NEUROLOGICAL:   Alert and oriented   No motor  deficits.    SKIN:   Skin normal color for race,   Warm and dry and intact.   No evidence of rash.            06-27-25 @ 07:01  -  06-28-25 @ 07:00  --------------------------------------------------------  IN:  Total IN: 0 mL    OUT:    Voided (mL): 1400 mL  Total OUT: 1400 mL    Total NET: -1400 mL          LABS:                            12.7   15.60 )-----------( 191      ( 27 Jun 2025 05:41 )             38.1                                               06-27    140  |  106  |  24[H]  ----------------------------<  105[H]  3.9   |  21  |  2.2[H]    Ca    8.2[L]      27 Jun 2025 05:41  Mg     2.1     06-27    TPro  5.8[L]  /  Alb  3.8  /  TBili  0.4  /  DBili  x   /  AST  27  /  ALT  26  /  AlkPhos  70  06-27                                             Urinalysis Basic - ( 27 Jun 2025 05:41 )    Color: x / Appearance: x / SG: x / pH: x  Gluc: 105 mg/dL / Ketone: x  / Bili: x / Urobili: x   Blood: x / Protein: x / Nitrite: x   Leuk Esterase: x / RBC: x / WBC x   Sq Epi: x / Non Sq Epi: x / Bacteria: x                                                  LIVER FUNCTIONS - ( 27 Jun 2025 05:41 )  Alb: 3.8 g/dL / Pro: 5.8 g/dL / ALK PHOS: 70 U/L / ALT: 26 U/L / AST: 27 U/L / GGT: x                                                                                                                                       MEDICATIONS  (STANDING):  albuterol    90 MICROgram(s) HFA Inhaler 2 Puff(s) Inhalation two times a day  amLODIPine   Tablet 10 milliGRAM(s) Oral daily  atorvastatin 40 milliGRAM(s) Oral at bedtime  buPROPion XL (24-Hour) . 150 milliGRAM(s) Oral daily  chlorhexidine 2% Cloths 1 Application(s) Topical <User Schedule>  dicyclomine 10 milliGRAM(s) Oral three times a day before meals  fluPHENAZine 10 milliGRAM(s) Oral daily  fluticasone propionate/ salmeterol 500-50 MICROgram(s) Diskus 1 Dose(s) Inhalation two times a day  folic acid 1 milliGRAM(s) Oral daily  heparin   Injectable 5000 Unit(s) SubCutaneous every 8 hours  hydrALAZINE 25 milliGRAM(s) Oral three times a day  lactulose Syrup 20 Gram(s) Oral daily  lidocaine   4% Patch 1 Patch Transdermal daily  memantine 5 milliGRAM(s) Oral at bedtime  nicotine -  14 mG/24Hr(s) Patch 1 Patch Transdermal daily  OLANZapine 20 milliGRAM(s) Oral at bedtime  pantoprazole    Tablet 40 milliGRAM(s) Oral before breakfast  piperacillin/tazobactam IVPB.. 3.375 Gram(s) IV Intermittent every 8 hours  polyethylene glycol 3350 17 Gram(s) Oral two times a day  predniSONE   Tablet   Oral   predniSONE   Tablet 30 milliGRAM(s) Oral daily  senna 2 Tablet(s) Oral at bedtime  tamsulosin 0.4 milliGRAM(s) Oral at bedtime  thiamine 100 milliGRAM(s) Oral daily    MEDICATIONS  (PRN):  albuterol/ipratropium for Nebulization 3 milliLiter(s) Nebulizer every 6 hours PRN Shortness of Breath and/or Wheezing  cyclobenzaprine 5 milliGRAM(s) Oral three times a day PRN Muscle Spasm  LORazepam     Tablet 0.5 milliGRAM(s) Oral every 8 hours PRN Anxiety  magnesium hydroxide Suspension 30 milliLiter(s) Oral daily PRN Constipation  melatonin 3 milliGRAM(s) Oral at bedtime PRN Insomnia  ondansetron Injectable 4 milliGRAM(s) IV Push every 8 hours PRN Nausea and/or Vomiting  oxycodone    5 mG/acetaminophen 325 mG 1 Tablet(s) Oral every 6 hours PRN Severe Pain (7 - 10)           Patient is a 79y old  Male who presents with a chief complaint of SOB and cough and abdominal pain  (27 Jun 2025 09:00)  Pneumonia   concern for diverticulitis - ruled out on CT, but found rib fractures   contaminated blood cultures  came to SDU due to req BIPAP        Over Night Events:  no events  still has some rib pain       PHYSICAL EXAM    ICU Vital Signs Last 24 Hrs  T(C): 36.4 (28 Jun 2025 04:00), Max: 36.5 (28 Jun 2025 00:00)  T(F): 97.5 (28 Jun 2025 04:00), Max: 97.7 (28 Jun 2025 00:00)  HR: 64 (28 Jun 2025 04:00) (64 - 79)  BP: 92/43 (28 Jun 2025 04:00) (92/43 - 136/63)  BP(mean): 62 (28 Jun 2025 04:00) (62 - 92)  RR: 18 (28 Jun 2025 04:00) (18 - 18)  SpO2: 94% (28 Jun 2025 04:00) (94% - 98%)    O2 Parameters below as of 27 Jun 2025 20:32  Patient On (Oxygen Delivery Method): nasal cannula  O2 Flow (L/min): 2          CONSTITUTIONAL:  ill looking      CARDIAC:   BRAVO 2.6        RESPIRATORY:   DEC BS both bases    GASTROINTESTINAL:  Abdomen soft,   Non-tender,   No guarding,   + BS    MUSCULOSKELETAL:   Range of motion is not limited,  No clubbing, cyanosis    NEUROLOGICAL:   Alert and oriented   No motor  deficits.                06-27-25 @ 07:01  -  06-28-25 @ 07:00  --------------------------------------------------------  IN:  Total IN: 0 mL    OUT:    Voided (mL): 1400 mL  Total OUT: 1400 mL    Total NET: -1400 mL          LABS:                            12.7   15.60 )-----------( 191      ( 27 Jun 2025 05:41 )             38.1                                               06-27    140  |  106  |  24[H]  ----------------------------<  105[H]  3.9   |  21  |  2.2[H]    Ca    8.2[L]      27 Jun 2025 05:41  Mg     2.1     06-27    TPro  5.8[L]  /  Alb  3.8  /  TBili  0.4  /  DBili  x   /  AST  27  /  ALT  26  /  AlkPhos  70  06-27                                             Urinalysis Basic - ( 27 Jun 2025 05:41 )    Color: x / Appearance: x / SG: x / pH: x  Gluc: 105 mg/dL / Ketone: x  / Bili: x / Urobili: x   Blood: x / Protein: x / Nitrite: x   Leuk Esterase: x / RBC: x / WBC x   Sq Epi: x / Non Sq Epi: x / Bacteria: x                                                  LIVER FUNCTIONS - ( 27 Jun 2025 05:41 )  Alb: 3.8 g/dL / Pro: 5.8 g/dL / ALK PHOS: 70 U/L / ALT: 26 U/L / AST: 27 U/L / GGT: x                                                                                                                                       MEDICATIONS  (STANDING):  albuterol    90 MICROgram(s) HFA Inhaler 2 Puff(s) Inhalation two times a day  amLODIPine   Tablet 10 milliGRAM(s) Oral daily  atorvastatin 40 milliGRAM(s) Oral at bedtime  buPROPion XL (24-Hour) . 150 milliGRAM(s) Oral daily  chlorhexidine 2% Cloths 1 Application(s) Topical <User Schedule>  dicyclomine 10 milliGRAM(s) Oral three times a day before meals  fluPHENAZine 10 milliGRAM(s) Oral daily  fluticasone propionate/ salmeterol 500-50 MICROgram(s) Diskus 1 Dose(s) Inhalation two times a day  folic acid 1 milliGRAM(s) Oral daily  heparin   Injectable 5000 Unit(s) SubCutaneous every 8 hours  hydrALAZINE 25 milliGRAM(s) Oral three times a day  lactulose Syrup 20 Gram(s) Oral daily  lidocaine   4% Patch 1 Patch Transdermal daily  memantine 5 milliGRAM(s) Oral at bedtime  nicotine -  14 mG/24Hr(s) Patch 1 Patch Transdermal daily  OLANZapine 20 milliGRAM(s) Oral at bedtime  pantoprazole    Tablet 40 milliGRAM(s) Oral before breakfast  piperacillin/tazobactam IVPB.. 3.375 Gram(s) IV Intermittent every 8 hours  polyethylene glycol 3350 17 Gram(s) Oral two times a day  predniSONE   Tablet   Oral   predniSONE   Tablet 30 milliGRAM(s) Oral daily  senna 2 Tablet(s) Oral at bedtime  tamsulosin 0.4 milliGRAM(s) Oral at bedtime  thiamine 100 milliGRAM(s) Oral daily    MEDICATIONS  (PRN):  albuterol/ipratropium for Nebulization 3 milliLiter(s) Nebulizer every 6 hours PRN Shortness of Breath and/or Wheezing  cyclobenzaprine 5 milliGRAM(s) Oral three times a day PRN Muscle Spasm  LORazepam     Tablet 0.5 milliGRAM(s) Oral every 8 hours PRN Anxiety  magnesium hydroxide Suspension 30 milliLiter(s) Oral daily PRN Constipation  melatonin 3 milliGRAM(s) Oral at bedtime PRN Insomnia  ondansetron Injectable 4 milliGRAM(s) IV Push every 8 hours PRN Nausea and/or Vomiting  oxycodone    5 mG/acetaminophen 325 mG 1 Tablet(s) Oral every 6 hours PRN Severe Pain (7 - 10)

## 2025-06-28 NOTE — PROGRESS NOTE ADULT - ASSESSMENT
IMPRESSION:    Acute hypoxemic respiratory failure - improved  COPD exacerbation  Aspiration PNA w/ recurrent aspiration events  Acute on chronic renal failure  Lactic acidosis improved  HO HTN, MDD, schizophrenia, dementia     PLAN:    CNS: Avoid CNS depressant.  CW OP meds.    HEENT:  Oral care    PULMONARY:  HOB @ 45 degrees.  Aspiration precaution.  NIV during sleep and PRN during the day.  Wean O2 as tolerated.  Keep POX target 92-96%.  Pred taper.  Nebs q6 PRN.  CT chest NC noted w/ BL consolidations / atelectasis.  Chest PT.  IPV q8.  Incentive spirometry.    CARDIOVASCULAR: TTE noted nl EF.  BP control.  Equal balance.  Lactate improved    GI: GI prophylaxis.  Prokinetic PRN.  Feeding as tolerated per speech.  Surgery eval noted.  CT Abd noted.    RENAL:  F/u  lytes.  Correct as needed.  Strict Is and Os.      INFECTIOUS DISEASE:  Finish Zosyn course today.  FU Cx.  MRSA nares negative.    HEMATOLOGICAL:  DVT prophylaxis.  LE doppler negative.    ENDOCRINE:  Follow up FS.  Insulin protocol if needed.    Full code    DGTF IMPRESSION:    Acute hypoxemic respiratory failure - improved  COPD exacerbation  Aspiration PNA w/ recurrent aspiration events/ atelectasis  Acute on chronic renal failure  Lactic acidosis improved  HO HTN, MDD, schizophrenia, dementia     PLAN:    CNS: Avoid CNS depressant.  CW OP meds.    HEENT:  Oral care    PULMONARY:  HOB @ 45 degrees.  Aspiration precaution.  NIV during sleep and PRN during the day.  Wean O2 as tolerated.  Keep POX target 92-96%.  Pred taper.  Nebs q6 PRN.  CT chest NC noted w/ BL consolidations / atelectasis.  Chest PT.  IPV q8.  Incentive spirometry.    CARDIOVASCULAR: TTE noted nl EF.  BP control.  Equal balance.  Lactate improved    GI: GI prophylaxis.  Prokinetic PRN.  Feeding as tolerated per speech.  Surgery eval noted.  CT Abd noted.    RENAL:  F/u  lytes.  Correct as needed.  Strict Is and Os.      INFECTIOUS DISEASE:  Finish Zosyn course today.  FU Cx.  MRSA nares negative.    HEMATOLOGICAL:  DVT prophylaxis.  LE doppler negative.    ENDOCRINE:  Follow up FS.  Insulin protocol if needed.    Full code    DGTF

## 2025-06-28 NOTE — PROGRESS NOTE ADULT - SUBJECTIVE AND OBJECTIVE BOX
24H events:    Today is hospital day 11d. This morning patient was seen and examined at bedside, resting comfortably in bed.    No acute or major events overnight.    PAST MEDICAL & SURGICAL HISTORY  Schizophrenia    No significant past surgical history        SOCIAL HISTORY:  Social History:      ALLERGIES:  No Known Allergies      MEDICATIONS:  STANDING MEDICATIONS  albuterol    90 MICROgram(s) HFA Inhaler 2 Puff(s) Inhalation two times a day  amLODIPine   Tablet 10 milliGRAM(s) Oral daily  atorvastatin 40 milliGRAM(s) Oral at bedtime  buPROPion XL (24-Hour) . 150 milliGRAM(s) Oral daily  chlorhexidine 2% Cloths 1 Application(s) Topical <User Schedule>  dicyclomine 10 milliGRAM(s) Oral three times a day before meals  fluPHENAZine 10 milliGRAM(s) Oral daily  fluticasone propionate/ salmeterol 500-50 MICROgram(s) Diskus 1 Dose(s) Inhalation two times a day  folic acid 1 milliGRAM(s) Oral daily  heparin   Injectable 5000 Unit(s) SubCutaneous every 8 hours  hydrALAZINE 25 milliGRAM(s) Oral three times a day  lactulose Syrup 20 Gram(s) Oral daily  lidocaine   4% Patch 1 Patch Transdermal daily  memantine 5 milliGRAM(s) Oral at bedtime  nicotine -  14 mG/24Hr(s) Patch 1 Patch Transdermal daily  OLANZapine 20 milliGRAM(s) Oral at bedtime  pantoprazole    Tablet 40 milliGRAM(s) Oral before breakfast  piperacillin/tazobactam IVPB.. 3.375 Gram(s) IV Intermittent every 8 hours  polyethylene glycol 3350 17 Gram(s) Oral two times a day  predniSONE   Tablet   Oral   predniSONE   Tablet 30 milliGRAM(s) Oral daily  senna 2 Tablet(s) Oral at bedtime  tamsulosin 0.4 milliGRAM(s) Oral at bedtime  thiamine 100 milliGRAM(s) Oral daily    PRN MEDICATIONS  albuterol/ipratropium for Nebulization 3 milliLiter(s) Nebulizer every 6 hours PRN  cyclobenzaprine 5 milliGRAM(s) Oral three times a day PRN  LORazepam     Tablet 0.5 milliGRAM(s) Oral every 8 hours PRN  magnesium hydroxide Suspension 30 milliLiter(s) Oral daily PRN  melatonin 3 milliGRAM(s) Oral at bedtime PRN  ondansetron Injectable 4 milliGRAM(s) IV Push every 8 hours PRN  oxycodone    5 mG/acetaminophen 325 mG 1 Tablet(s) Oral every 6 hours PRN      VITALS:   T(C): 34.7 (06-28-25 @ 08:11), Max: 36.5 (06-28-25 @ 00:00)  HR: 70 (06-28-25 @ 08:11) (64 - 79)  BP: 92/43 (06-28-25 @ 08:11) (92/43 - 136/63)  RR: 18 (06-28-25 @ 08:11) (18 - 18)  SpO2: 90% (06-28-25 @ 08:11) (90% - 98%)  I&O's Summary    27 Jun 2025 07:01  -  28 Jun 2025 07:00  --------------------------------------------------------  IN: 0 mL / OUT: 2050 mL / NET: -2050 mL          PHYSICAL EXAM:  GENERAL: well built, well nourished  HEAD:  Atraumatic, Normocephalic  EYES: EOMI, PERRLA, conjunctiva and sclera clear  NECK: Supple, No JVD  NERVOUS SYSTEM:  Alert & Oriented X3,  motor and  sensory intact  CHEST/LUNG: B/L good air entry; No rales, rhonchi, or wheezing  HEART: S1S2 normal, no S3, Regular rate and rhythm; No murmurs heard  ABDOMEN: Soft, Nontender, Nondistended; Bowel sounds present  EXTREMITIES:  2+ Peripheral Pulses, No edema  SKIN: No rashes or lesions    LABS:                        12.7   15.60 )-----------( 191      ( 27 Jun 2025 05:41 )             38.1     06-27    140  |  106  |  24[H]  ----------------------------<  105[H]  3.9   |  21  |  2.2[H]    Ca    8.2[L]      27 Jun 2025 05:41  Mg     2.1     06-27    TPro  5.8[L]  /  Alb  3.8  /  TBili  0.4  /  DBili  x   /  AST  27  /  ALT  26  /  AlkPhos  70  06-27      Urinalysis Basic - ( 27 Jun 2025 05:41 )    Color: x / Appearance: x / SG: x / pH: x  Gluc: 105 mg/dL / Ketone: x  / Bili: x / Urobili: x   Blood: x / Protein: x / Nitrite: x   Leuk Esterase: x / RBC: x / WBC x   Sq Epi: x / Non Sq Epi: x / Bacteria: x                     24H events:    Today is hospital day 11d. This morning patient was seen and examined at bedside, resting comfortably in bed.    No acute or major events overnight.    PAST MEDICAL & SURGICAL HISTORY  Schizophrenia    No significant past surgical history        SOCIAL HISTORY:  Social History:      ALLERGIES:  No Known Allergies      MEDICATIONS:  STANDING MEDICATIONS  albuterol    90 MICROgram(s) HFA Inhaler 2 Puff(s) Inhalation two times a day  amLODIPine   Tablet 10 milliGRAM(s) Oral daily  atorvastatin 40 milliGRAM(s) Oral at bedtime  buPROPion XL (24-Hour) . 150 milliGRAM(s) Oral daily  chlorhexidine 2% Cloths 1 Application(s) Topical <User Schedule>  dicyclomine 10 milliGRAM(s) Oral three times a day before meals  fluPHENAZine 10 milliGRAM(s) Oral daily  fluticasone propionate/ salmeterol 500-50 MICROgram(s) Diskus 1 Dose(s) Inhalation two times a day  folic acid 1 milliGRAM(s) Oral daily  heparin   Injectable 5000 Unit(s) SubCutaneous every 8 hours  hydrALAZINE 25 milliGRAM(s) Oral three times a day  lactulose Syrup 20 Gram(s) Oral daily  lidocaine   4% Patch 1 Patch Transdermal daily  memantine 5 milliGRAM(s) Oral at bedtime  nicotine -  14 mG/24Hr(s) Patch 1 Patch Transdermal daily  OLANZapine 20 milliGRAM(s) Oral at bedtime  pantoprazole    Tablet 40 milliGRAM(s) Oral before breakfast  piperacillin/tazobactam IVPB.. 3.375 Gram(s) IV Intermittent every 8 hours  polyethylene glycol 3350 17 Gram(s) Oral two times a day  predniSONE   Tablet   Oral   predniSONE   Tablet 30 milliGRAM(s) Oral daily  senna 2 Tablet(s) Oral at bedtime  tamsulosin 0.4 milliGRAM(s) Oral at bedtime  thiamine 100 milliGRAM(s) Oral daily    PRN MEDICATIONS  albuterol/ipratropium for Nebulization 3 milliLiter(s) Nebulizer every 6 hours PRN  cyclobenzaprine 5 milliGRAM(s) Oral three times a day PRN  LORazepam     Tablet 0.5 milliGRAM(s) Oral every 8 hours PRN  magnesium hydroxide Suspension 30 milliLiter(s) Oral daily PRN  melatonin 3 milliGRAM(s) Oral at bedtime PRN  ondansetron Injectable 4 milliGRAM(s) IV Push every 8 hours PRN  oxycodone    5 mG/acetaminophen 325 mG 1 Tablet(s) Oral every 6 hours PRN      VITALS:   T(C): 34.7 (06-28-25 @ 08:11), Max: 36.5 (06-28-25 @ 00:00)  HR: 70 (06-28-25 @ 08:11) (64 - 79)  BP: 92/43 (06-28-25 @ 08:11) (92/43 - 136/63)  RR: 18 (06-28-25 @ 08:11) (18 - 18)  SpO2: 90% (06-28-25 @ 08:11) (90% - 98%)  I&O's Summary    27 Jun 2025 07:01  -  28 Jun 2025 07:00  --------------------------------------------------------  IN: 0 mL / OUT: 2050 mL / NET: -2050 mL          PHYSICAL EXAM:  GENERAL: well built, well nourished  HEAD:  Atraumatic, Normocephalic  EYES: EOMI, PERRLA, conjunctiva and sclera clear  NECK: Supple, No JVD  NERVOUS SYSTEM:  Alert & Oriented X3,  motor and  sensory intact  CHEST/LUNG: B/L good air entry; No rales, rhonchi, or wheezing  HEART: S1S2 normal, no S3, Regular rate and rhythm; No murmurs heard  ABDOMEN: Soft, Nontender, Nondistended; Bowel sounds present  EXTREMITIES:  2+ Peripheral Pulses, No edema  SKIN: No rashes or lesions    LABS:                        12.7   15.60 )-----------( 191      ( 27 Jun 2025 05:41 )             38.1     06-27    140  |  106  |  24[H]  ----------------------------<  105[H]  3.9   |  21  |  2.2[H]    Ca    8.2[L]      27 Jun 2025 05:41  Mg     2.1     06-27    TPro  5.8[L]  /  Alb  3.8  /  TBili  0.4  /  DBili  x   /  AST  27  /  ALT  26  /  AlkPhos  70  06-27      Urinalysis Basic - ( 27 Jun 2025 05:41 )    Color: x / Appearance: x / SG: x / pH: x  Gluc: 105 mg/dL / Ketone: x  / Bili: x / Urobili: x   Blood: x / Protein: x / Nitrite: x   Leuk Esterase: x / RBC: x / WBC x   Sq Epi: x / Non Sq Epi: x / Bacteria: x        Sodium: 141 mmol/L (06-25-25 @ 05:26)  Sodium: 137 mmol/L (06-26-25 @ 06:13)  Sodium: 140 mmol/L (06-27-25 @ 05:41)    Potassium: 3.9 mmol/L (06-25-25 @ 05:26)  Potassium: 4.3 mmol/L (06-26-25 @ 06:13)  Potassium: 3.9 mmol/L (06-27-25 @ 05:41)    Creatinine: 2.0 mg/dL *H* (06-25-25 @ 05:26)  Creatinine: 2.1 mg/dL *H* (06-26-25 @ 06:13)  Creatinine: 2.2 mg/dL *H* (06-27-25 @ 05:41)    Hemoglobin: 12.7 g/dL *L* (06-25-25 @ 05:26)  Hemoglobin: 14.0 g/dL (06-26-25 @ 06:13)  Hemoglobin: 12.7 g/dL *L* (06-27-25 @ 05:41)    Platelet Count - Automated: 176 K/uL (06-25-25 @ 05:26)  Platelet Count - Automated: 197 K/uL (06-26-25 @ 06:13)  Platelet Count - Automated: 191 K/uL (06-27-25 @ 05:41)    WBC Count: 11.79 K/uL *H* (06-25-25 @ 05:26)  WBC Count: 9.78 K/uL (06-26-25 @ 06:13)  WBC Count: 15.60 K/uL *H* (06-27-25 @ 05:41)    Lactate, Blood: 2.2 mmol/L *H* (06-21-25 @ 01:18)  Lactate, Blood: 1.3 mmol/L (06-21-25 @ 05:00)  Lactate, Blood: 1.3 mmol/L (06-23-25 @ 11:03)   24H events:    Today is hospital day 11d. This morning patient was seen and examined at bedside, resting comfortably in bed on 2L NC. His shortness of breath and cough have improved. His left-sided rib pain is still present but has become more manageable for him. He denies any fever, chills, chest pain, palpitations, nausea, vomiting, constipation, diarrhea. Vitals are stable. No acute or major events overnight.    PAST MEDICAL & SURGICAL HISTORY  Schizophrenia    MDD    Dementia    HTN      No significant past surgical history        SOCIAL HISTORY:  Social History:      ALLERGIES:  No Known Allergies      MEDICATIONS:  STANDING MEDICATIONS  albuterol    90 MICROgram(s) HFA Inhaler 2 Puff(s) Inhalation two times a day  amLODIPine   Tablet 10 milliGRAM(s) Oral daily  atorvastatin 40 milliGRAM(s) Oral at bedtime  buPROPion XL (24-Hour) . 150 milliGRAM(s) Oral daily  chlorhexidine 2% Cloths 1 Application(s) Topical <User Schedule>  dicyclomine 10 milliGRAM(s) Oral three times a day before meals  fluPHENAZine 10 milliGRAM(s) Oral daily  fluticasone propionate/ salmeterol 500-50 MICROgram(s) Diskus 1 Dose(s) Inhalation two times a day  folic acid 1 milliGRAM(s) Oral daily  heparin   Injectable 5000 Unit(s) SubCutaneous every 8 hours  hydrALAZINE 25 milliGRAM(s) Oral three times a day  lactulose Syrup 20 Gram(s) Oral daily  lidocaine   4% Patch 1 Patch Transdermal daily  memantine 5 milliGRAM(s) Oral at bedtime  nicotine -  14 mG/24Hr(s) Patch 1 Patch Transdermal daily  OLANZapine 20 milliGRAM(s) Oral at bedtime  pantoprazole    Tablet 40 milliGRAM(s) Oral before breakfast  piperacillin/tazobactam IVPB.. 3.375 Gram(s) IV Intermittent every 8 hours  polyethylene glycol 3350 17 Gram(s) Oral two times a day  predniSONE   Tablet   Oral   predniSONE   Tablet 30 milliGRAM(s) Oral daily  senna 2 Tablet(s) Oral at bedtime  tamsulosin 0.4 milliGRAM(s) Oral at bedtime  thiamine 100 milliGRAM(s) Oral daily    PRN MEDICATIONS  albuterol/ipratropium for Nebulization 3 milliLiter(s) Nebulizer every 6 hours PRN  cyclobenzaprine 5 milliGRAM(s) Oral three times a day PRN  LORazepam     Tablet 0.5 milliGRAM(s) Oral every 8 hours PRN  magnesium hydroxide Suspension 30 milliLiter(s) Oral daily PRN  melatonin 3 milliGRAM(s) Oral at bedtime PRN  ondansetron Injectable 4 milliGRAM(s) IV Push every 8 hours PRN  oxycodone    5 mG/acetaminophen 325 mG 1 Tablet(s) Oral every 6 hours PRN      VITALS:   T(C): 34.7 (06-28-25 @ 08:11), Max: 36.5 (06-28-25 @ 00:00)  HR: 70 (06-28-25 @ 08:11) (64 - 79)  BP: 92/43 (06-28-25 @ 08:11) (92/43 - 136/63)  RR: 18 (06-28-25 @ 08:11) (18 - 18)  SpO2: 90% (06-28-25 @ 08:11) (90% - 98%)  I&O's Summary    27 Jun 2025 07:01  -  28 Jun 2025 07:00  --------------------------------------------------------  IN: 0 mL / OUT: 2050 mL / NET: -2050 mL          PHYSICAL EXAM:  GENERAL: lying in bed on 2L NC, no acute respiratory distress  NERVOUS SYSTEM:  Alert & Oriented X3,  motor and sensory intact  CHEST/LUNG: B/L decreased basilar breath sounds; No rales, rhonchi, or wheezing  HEART: S1S2 normal, no S3, Regular rate and rhythm; No murmurs heard  ABDOMEN: Soft, Nontender, Nondistended; Bowel sounds present  EXTREMITIES:  2+ Peripheral Pulses, No peripheral edema  SKIN: No rashes or lesions    LABS:                        12.7   15.60 )-----------( 191      ( 27 Jun 2025 05:41 )             38.1     06-27    140  |  106  |  24[H]  ----------------------------<  105[H]  3.9   |  21  |  2.2[H]    Ca    8.2[L]      27 Jun 2025 05:41  Mg     2.1     06-27    TPro  5.8[L]  /  Alb  3.8  /  TBili  0.4  /  DBili  x   /  AST  27  /  ALT  26  /  AlkPhos  70  06-27      Urinalysis Basic - ( 27 Jun 2025 05:41 )    Color: x / Appearance: x / SG: x / pH: x  Gluc: 105 mg/dL / Ketone: x  / Bili: x / Urobili: x   Blood: x / Protein: x / Nitrite: x   Leuk Esterase: x / RBC: x / WBC x   Sq Epi: x / Non Sq Epi: x / Bacteria: x        Sodium: 141 mmol/L (06-25-25 @ 05:26)  Sodium: 137 mmol/L (06-26-25 @ 06:13)  Sodium: 140 mmol/L (06-27-25 @ 05:41)    Potassium: 3.9 mmol/L (06-25-25 @ 05:26)  Potassium: 4.3 mmol/L (06-26-25 @ 06:13)  Potassium: 3.9 mmol/L (06-27-25 @ 05:41)    Creatinine: 2.0 mg/dL *H* (06-25-25 @ 05:26)  Creatinine: 2.1 mg/dL *H* (06-26-25 @ 06:13)  Creatinine: 2.2 mg/dL *H* (06-27-25 @ 05:41)    Hemoglobin: 12.7 g/dL *L* (06-25-25 @ 05:26)  Hemoglobin: 14.0 g/dL (06-26-25 @ 06:13)  Hemoglobin: 12.7 g/dL *L* (06-27-25 @ 05:41)    Platelet Count - Automated: 176 K/uL (06-25-25 @ 05:26)  Platelet Count - Automated: 197 K/uL (06-26-25 @ 06:13)  Platelet Count - Automated: 191 K/uL (06-27-25 @ 05:41)    WBC Count: 11.79 K/uL *H* (06-25-25 @ 05:26)  WBC Count: 9.78 K/uL (06-26-25 @ 06:13)  WBC Count: 15.60 K/uL *H* (06-27-25 @ 05:41)    Lactate, Blood: 2.2 mmol/L *H* (06-21-25 @ 01:18)  Lactate, Blood: 1.3 mmol/L (06-21-25 @ 05:00)  Lactate, Blood: 1.3 mmol/L (06-23-25 @ 11:03)

## 2025-06-28 NOTE — PROGRESS NOTE ADULT - ASSESSMENT
80 yo male with a history of HTN, schizophrenia, dementia, and MDD presented to the ED due to sudden left-sided flank pain accompanied by SOB and cough that seems to exacerbate the pain. He is a current smoker. He developed AHRF associated with aspiration pneumonia and suspected COPD exacerbation. He is now on LFNC.    #Acute hypoxemic respiratory failure  #Aspiration pneumonia  #COPD exacerbation  #Left flank pain secondary to possible radiculopathy  #Constipation  - CTA Chest 6/17: Negative for pulmonary emboli. Emphysematous changes. Bibasilar atelectasis. Areas of ground glass attenuation which could reflect air trapping/airway disease.  - CT A/P 6/17: no acute abdominal findings  - CXR 6/24: B/L lower lobe opacities  - CT A/P 6/24: No significant change from recent CT. No CT evidence of acute abdominal pathology. B/L basilar consolidation secondary to aspiration.  - CT chest 6/24: B/L consolidations/atelectasis suggestive of aspiration and fracture of left 10th rib (likely causing his flank pain)  - CXR 6/7: stable R > L basilar opacities  - Lipase wnl  - MRSA negative, vancomycin was d/c.   - Tolerating 2L LFNC and pain has greatly improved  - Pulmonary following: recommended HOB 45 degrees, aspiration precaution, NIV during sleep and prn during the day, wean O2 as tolerated, prednisone taper, nebs q6 prn, chest PT, IPV q8h, incentive spirometry  - C/w lactulose, miralax, senna  - C/w pain relief regimen (lidocaine patch, oxycodone, Flexeril)  - Reglan prn for nausea  - ID following: prior aspiration event, WBC 15, Zosyn course finished   - Swallow FEES performed: mild oropharyngeal dysphagia, recommended consistency minced and moist with thin liquids  - PT eval      #Acute on chronic renal failure  #Lactic acidosis  - Elevated Cr at 2.2 and BUN at 24, eGFR 30  - Lactate elevated at 2.8  - C/w Flomax  - Monitor BMP and correct as needed  - Monitor I&Os      #HTN, schizophrenia, dementia, MDD  - C/w amlodipine, hydralazine, psych meds    #MISC  -DVT ppx: heparin SQ  -GI ppx: protonix  -DIET: DASH/TLC  -Activity: AAT  -Code Status: Full   -DIspo: DGTF    -Pending: PT, prednisone taper, IPV, chest PT, incentive spirometry, continue pain regimen, wean O2 as tolerated, monitor BMP

## 2025-06-29 LAB
ALBUMIN SERPL ELPH-MCNC: 3.4 G/DL — LOW (ref 3.5–5.2)
ALP SERPL-CCNC: 63 U/L — SIGNIFICANT CHANGE UP (ref 30–115)
ALT FLD-CCNC: 53 U/L — HIGH (ref 0–41)
ANION GAP SERPL CALC-SCNC: 10 MMOL/L — SIGNIFICANT CHANGE UP (ref 7–14)
AST SERPL-CCNC: 40 U/L — SIGNIFICANT CHANGE UP (ref 0–41)
BASOPHILS # BLD AUTO: 0.06 K/UL — SIGNIFICANT CHANGE UP (ref 0–0.2)
BASOPHILS NFR BLD AUTO: 0.3 % — SIGNIFICANT CHANGE UP (ref 0–1)
BILIRUB SERPL-MCNC: 0.3 MG/DL — SIGNIFICANT CHANGE UP (ref 0.2–1.2)
BUN SERPL-MCNC: 19 MG/DL — SIGNIFICANT CHANGE UP (ref 10–20)
CALCIUM SERPL-MCNC: 8.6 MG/DL — SIGNIFICANT CHANGE UP (ref 8.4–10.5)
CHLORIDE SERPL-SCNC: 107 MMOL/L — SIGNIFICANT CHANGE UP (ref 98–110)
CO2 SERPL-SCNC: 23 MMOL/L — SIGNIFICANT CHANGE UP (ref 17–32)
CREAT SERPL-MCNC: 1.8 MG/DL — HIGH (ref 0.7–1.5)
EGFR: 38 ML/MIN/1.73M2 — LOW
EGFR: 38 ML/MIN/1.73M2 — LOW
EOSINOPHIL # BLD AUTO: 0.05 K/UL — SIGNIFICANT CHANGE UP (ref 0–0.7)
EOSINOPHIL NFR BLD AUTO: 0.3 % — SIGNIFICANT CHANGE UP (ref 0–8)
GLUCOSE SERPL-MCNC: 85 MG/DL — SIGNIFICANT CHANGE UP (ref 70–99)
HCT VFR BLD CALC: 38.2 % — LOW (ref 42–52)
HGB BLD-MCNC: 12.7 G/DL — LOW (ref 14–18)
IMM GRANULOCYTES NFR BLD AUTO: 3 % — HIGH (ref 0.1–0.3)
LYMPHOCYTES # BLD AUTO: 12.8 % — LOW (ref 20.5–51.1)
LYMPHOCYTES # BLD AUTO: 2.45 K/UL — SIGNIFICANT CHANGE UP (ref 1.2–3.4)
MAGNESIUM SERPL-MCNC: 2 MG/DL — SIGNIFICANT CHANGE UP (ref 1.8–2.4)
MCHC RBC-ENTMCNC: 29.8 PG — SIGNIFICANT CHANGE UP (ref 27–31)
MCHC RBC-ENTMCNC: 33.2 G/DL — SIGNIFICANT CHANGE UP (ref 32–37)
MCV RBC AUTO: 89.7 FL — SIGNIFICANT CHANGE UP (ref 80–94)
MONOCYTES # BLD AUTO: 1.09 K/UL — HIGH (ref 0.1–0.6)
MONOCYTES NFR BLD AUTO: 5.7 % — SIGNIFICANT CHANGE UP (ref 1.7–9.3)
NEUTROPHILS # BLD AUTO: 14.95 K/UL — HIGH (ref 1.4–6.5)
NEUTROPHILS NFR BLD AUTO: 77.9 % — HIGH (ref 42.2–75.2)
NRBC BLD AUTO-RTO: 0 /100 WBCS — SIGNIFICANT CHANGE UP (ref 0–0)
PLATELET # BLD AUTO: 194 K/UL — SIGNIFICANT CHANGE UP (ref 130–400)
PMV BLD: 11.3 FL — HIGH (ref 7.4–10.4)
POTASSIUM SERPL-MCNC: 4.1 MMOL/L — SIGNIFICANT CHANGE UP (ref 3.5–5)
POTASSIUM SERPL-SCNC: 4.1 MMOL/L — SIGNIFICANT CHANGE UP (ref 3.5–5)
PROT SERPL-MCNC: 5.8 G/DL — LOW (ref 6–8)
RBC # BLD: 4.26 M/UL — LOW (ref 4.7–6.1)
RBC # FLD: 13.7 % — SIGNIFICANT CHANGE UP (ref 11.5–14.5)
SODIUM SERPL-SCNC: 140 MMOL/L — SIGNIFICANT CHANGE UP (ref 135–146)
WBC # BLD: 19.18 K/UL — HIGH (ref 4.8–10.8)
WBC # FLD AUTO: 19.18 K/UL — HIGH (ref 4.8–10.8)

## 2025-06-29 PROCEDURE — 99232 SBSQ HOSP IP/OBS MODERATE 35: CPT

## 2025-06-29 RX ORDER — PREDNISONE 20 MG/1
20 TABLET ORAL DAILY
Refills: 0 | Status: DISCONTINUED | OUTPATIENT
Start: 2025-06-29 | End: 2025-07-01

## 2025-06-29 RX ADMIN — MEMANTINE HYDROCHLORIDE 5 MILLIGRAM(S): 21 CAPSULE, EXTENDED RELEASE ORAL at 21:31

## 2025-06-29 RX ADMIN — ATORVASTATIN CALCIUM 40 MILLIGRAM(S): 80 TABLET, FILM COATED ORAL at 21:31

## 2025-06-29 RX ADMIN — Medication 25 MILLIGRAM(S): at 21:31

## 2025-06-29 RX ADMIN — HEPARIN SODIUM 5000 UNIT(S): 1000 INJECTION INTRAVENOUS; SUBCUTANEOUS at 21:32

## 2025-06-29 RX ADMIN — LIDOCAINE HYDROCHLORIDE 1 PATCH: 20 JELLY TOPICAL at 11:29

## 2025-06-29 RX ADMIN — Medication 25 MILLIGRAM(S): at 05:24

## 2025-06-29 RX ADMIN — BUPROPION HYDROBROMIDE 150 MILLIGRAM(S): 522 TABLET, EXTENDED RELEASE ORAL at 11:26

## 2025-06-29 RX ADMIN — Medication 10 MILLIGRAM(S): at 11:29

## 2025-06-29 RX ADMIN — NICOTINE POLACRILEX 1 PATCH: 4 GUM, CHEWING ORAL at 19:23

## 2025-06-29 RX ADMIN — Medication 1 APPLICATION(S): at 05:25

## 2025-06-29 RX ADMIN — Medication 25 MILLIGRAM(S): at 11:27

## 2025-06-29 RX ADMIN — Medication 2 TABLET(S): at 21:30

## 2025-06-29 RX ADMIN — LACTULOSE 20 GRAM(S): 10 SOLUTION ORAL at 11:27

## 2025-06-29 RX ADMIN — Medication 1 DOSE(S): at 11:30

## 2025-06-29 RX ADMIN — TAMSULOSIN HYDROCHLORIDE 0.4 MILLIGRAM(S): 0.4 CAPSULE ORAL at 21:31

## 2025-06-29 RX ADMIN — AMLODIPINE BESYLATE 10 MILLIGRAM(S): 10 TABLET ORAL at 05:23

## 2025-06-29 RX ADMIN — NICOTINE POLACRILEX 1 PATCH: 4 GUM, CHEWING ORAL at 17:39

## 2025-06-29 RX ADMIN — Medication 40 MILLIGRAM(S): at 05:25

## 2025-06-29 RX ADMIN — NICOTINE POLACRILEX 1 PATCH: 4 GUM, CHEWING ORAL at 11:32

## 2025-06-29 RX ADMIN — NICOTINE POLACRILEX 1 PATCH: 4 GUM, CHEWING ORAL at 11:27

## 2025-06-29 RX ADMIN — Medication 10 MILLIGRAM(S): at 17:36

## 2025-06-29 RX ADMIN — FOLIC ACID 1 MILLIGRAM(S): 1 TABLET ORAL at 11:27

## 2025-06-29 RX ADMIN — POLYETHYLENE GLYCOL 3350 17 GRAM(S): 17 POWDER, FOR SOLUTION ORAL at 05:23

## 2025-06-29 RX ADMIN — OLANZAPINE 20 MILLIGRAM(S): 10 TABLET ORAL at 21:31

## 2025-06-29 RX ADMIN — Medication 10 MILLIGRAM(S): at 11:26

## 2025-06-29 RX ADMIN — Medication 100 MILLIGRAM(S): at 11:27

## 2025-06-29 RX ADMIN — HEPARIN SODIUM 5000 UNIT(S): 1000 INJECTION INTRAVENOUS; SUBCUTANEOUS at 05:24

## 2025-06-29 RX ADMIN — PREDNISONE 30 MILLIGRAM(S): 20 TABLET ORAL at 05:23

## 2025-06-29 RX ADMIN — HEPARIN SODIUM 5000 UNIT(S): 1000 INJECTION INTRAVENOUS; SUBCUTANEOUS at 11:27

## 2025-06-29 RX ADMIN — PREDNISONE 20 MILLIGRAM(S): 20 TABLET ORAL at 11:26

## 2025-06-29 RX ADMIN — POLYETHYLENE GLYCOL 3350 17 GRAM(S): 17 POWDER, FOR SOLUTION ORAL at 17:37

## 2025-06-29 NOTE — PROGRESS NOTE ADULT - SUBJECTIVE AND OBJECTIVE BOX
pt seen and examined.     My notes supersede resident's notes in case of discrepancy       ROS: no cp, no sob, no n/v, no fever    Vital Signs Last 24 Hrs  T(C): 36.6 (29 Jun 2025 04:39), Max: 36.6 (29 Jun 2025 04:39)  T(F): 97.8 (29 Jun 2025 04:39), Max: 97.8 (29 Jun 2025 04:39)  HR: 66 (29 Jun 2025 04:39) (66 - 80)  BP: 136/68 (29 Jun 2025 04:39) (128/62 - 136/68)  BP(mean): 88 (28 Jun 2025 15:54) (88 - 96)  RR: 18 (29 Jun 2025 04:39) (18 - 18)  SpO2: 95% (29 Jun 2025 04:39) (93% - 97%)    Parameters below as of 28 Jun 2025 15:54  Patient On (Oxygen Delivery Method): nasal cannula    physical exam  constitutional NAD, AAOX3, Respiratory  lungs CTA, CVS heart RRR, GI: abdomen Soft NT, ND, BS+, skin: intact  neuro exam no focal deficit     MEDICATIONS  (STANDING):  albuterol    90 MICROgram(s) HFA Inhaler 2 Puff(s) Inhalation two times a day  amLODIPine   Tablet 10 milliGRAM(s) Oral daily  atorvastatin 40 milliGRAM(s) Oral at bedtime  buPROPion XL (24-Hour) . 150 milliGRAM(s) Oral daily  chlorhexidine 2% Cloths 1 Application(s) Topical <User Schedule>  dicyclomine 10 milliGRAM(s) Oral three times a day before meals  fluPHENAZine 10 milliGRAM(s) Oral daily  fluticasone propionate/ salmeterol 500-50 MICROgram(s) Diskus 1 Dose(s) Inhalation two times a day  folic acid 1 milliGRAM(s) Oral daily  heparin   Injectable 5000 Unit(s) SubCutaneous every 8 hours  hydrALAZINE 25 milliGRAM(s) Oral three times a day  lactulose Syrup 20 Gram(s) Oral daily  lidocaine   4% Patch 1 Patch Transdermal daily  memantine 5 milliGRAM(s) Oral at bedtime  nicotine -  14 mG/24Hr(s) Patch 1 Patch Transdermal daily  OLANZapine 20 milliGRAM(s) Oral at bedtime  pantoprazole    Tablet 40 milliGRAM(s) Oral before breakfast  polyethylene glycol 3350 17 Gram(s) Oral two times a day  predniSONE   Tablet   Oral   senna 2 Tablet(s) Oral at bedtime  tamsulosin 0.4 milliGRAM(s) Oral at bedtime  thiamine 100 milliGRAM(s) Oral daily    MEDICATIONS  (PRN):  albuterol/ipratropium for Nebulization 3 milliLiter(s) Nebulizer every 6 hours PRN Shortness of Breath and/or Wheezing  cyclobenzaprine 5 milliGRAM(s) Oral three times a day PRN Muscle Spasm  LORazepam     Tablet 0.5 milliGRAM(s) Oral every 8 hours PRN Anxiety  magnesium hydroxide Suspension 30 milliLiter(s) Oral daily PRN Constipation  melatonin 3 milliGRAM(s) Oral at bedtime PRN Insomnia  ondansetron Injectable 4 milliGRAM(s) IV Push every 8 hours PRN Nausea and/or Vomiting  oxycodone    5 mG/acetaminophen 325 mG 1 Tablet(s) Oral every 6 hours PRN Severe Pain (7 - 10)                        12.7   19.18 )-----------( 194      ( 29 Jun 2025 07:33 )             38.2     06-29    140  |  107  |  19  ----------------------------<  85  4.1   |  23  |  1.8[H]    Ca    8.6      29 Jun 2025 07:33  Mg     2.0     06-29    TPro  5.8[L]  /  Alb  3.4[L]  /  TBili  0.3  /  DBili  x   /  AST  40  /  ALT  53[H]  /  AlkPhos  63  06-29    Procalcitonin: 0.05 ng/mL [0.02 - 0.10] (06-18-25 @ 12:41)    < from: Xray Chest 1 View- PORTABLE-Routine (Xray Chest 1 View- PORTABLE-Routine in AM.) (06.27.25 @ 08:37) >  Stable right greater than left basilar opacities. No   pneumothorax    Skeletal/ soft tissues: Stable    < end of copied text >    Blood Gas Profile - Arterial (06.24.25 @ 09:15)    pH, Arterial: 7.35   pCO2, Arterial: 35 mmHg   pO2, Arterial: 119 mmHg   HCO3, Arterial: 19 mmol/L   Base Excess, Arterial: -5.6 mmol/L   Oxygen Saturation, Arterial: 98.7 %   FIO2, Arterial: 100.0   Blood Gas Source Arterial: Arterial    < from: CT Angio Chest PE Protocol w/ IV Cont (06.17.25 @ 12:33) >  Negative for pulmonary emboli    Emphysematous changes. Bibasilar atelectasis. Areas of groundglass   attenuation which could reflect air trapping/airway disease.    < end of copied text >    FluA/FluB/RSV/COVID PCR (06.18.25 @ 00:00)neg  mrsa neg    a/p  79y Male with PMH of HTN, schizophrenia, dementia, and MDD presented to the ED due to sudden left-sided flank pain accompanied by SOB and cough     #Acute hypoxemic respiratory failure due to copd exacerebation , not pnuemonia ( xray has not shown any new xray changes/infiltrate)   cont nebs  cont tapering prednisone ( slowly)   bipap if needed  pt is not retaining co2  active smoker, counseled   possible sepsis poa ( ? bronchitis ) , which resolved, now the leukocytosis is probably due to steroids   sp antibiotics ( ended 6/27)   cont nebs    # Dysphagia   - Swallow FEES performed: mild oropharyngeal dysphagia, recommended consistency minced and moist with thin liquids    # franki on ckd , franki is improving , possible due to initial sepsis/dehydration   -S.Cr at baseline   creatinine trend  1.8 (06-29-25 @ 07:33)  2.2 (06-27-25 @ 05:41)  2.1 (06-26-25 @ 06:13)  2.0 (06-25-25 @ 05:26)  Creatinine, Serum: 1.5 mg/dL (03.25.22 @ 11:00)  monitor cr     #HTN, controlled   - C/w amlodipine, hydralazine    # schizophrenia, dementia, MDD  mood stable on psych meds    #Progress Note Handoff    Pending :  pt eval, discharge planning , trend cbc and cr   Family discussion: dw pt   Disposition:  Return to [facility] vs STR   code status: full code

## 2025-06-30 LAB
ALBUMIN SERPL ELPH-MCNC: 4 G/DL — SIGNIFICANT CHANGE UP (ref 3.5–5.2)
ALP SERPL-CCNC: 72 U/L — SIGNIFICANT CHANGE UP (ref 30–115)
ALT FLD-CCNC: 52 U/L — HIGH (ref 0–41)
ANION GAP SERPL CALC-SCNC: 14 MMOL/L — SIGNIFICANT CHANGE UP (ref 7–14)
AST SERPL-CCNC: 30 U/L — SIGNIFICANT CHANGE UP (ref 0–41)
BASOPHILS # BLD AUTO: 0.06 K/UL — SIGNIFICANT CHANGE UP (ref 0–0.2)
BASOPHILS NFR BLD AUTO: 0.3 % — SIGNIFICANT CHANGE UP (ref 0–1)
BILIRUB SERPL-MCNC: 0.3 MG/DL — SIGNIFICANT CHANGE UP (ref 0.2–1.2)
BUN SERPL-MCNC: 21 MG/DL — HIGH (ref 10–20)
CALCIUM SERPL-MCNC: 9.1 MG/DL — SIGNIFICANT CHANGE UP (ref 8.4–10.5)
CHLORIDE SERPL-SCNC: 107 MMOL/L — SIGNIFICANT CHANGE UP (ref 98–110)
CO2 SERPL-SCNC: 22 MMOL/L — SIGNIFICANT CHANGE UP (ref 17–32)
CREAT SERPL-MCNC: 1.7 MG/DL — HIGH (ref 0.7–1.5)
EGFR: 41 ML/MIN/1.73M2 — LOW
EGFR: 41 ML/MIN/1.73M2 — LOW
EOSINOPHIL # BLD AUTO: 0.01 K/UL — SIGNIFICANT CHANGE UP (ref 0–0.7)
EOSINOPHIL NFR BLD AUTO: 0 % — SIGNIFICANT CHANGE UP (ref 0–8)
GLUCOSE BLDC GLUCOMTR-MCNC: 115 MG/DL — HIGH (ref 70–99)
GLUCOSE SERPL-MCNC: 92 MG/DL — SIGNIFICANT CHANGE UP (ref 70–99)
HCT VFR BLD CALC: 40.8 % — LOW (ref 42–52)
HGB BLD-MCNC: 13.6 G/DL — LOW (ref 14–18)
IMM GRANULOCYTES NFR BLD AUTO: 2.7 % — HIGH (ref 0.1–0.3)
LYMPHOCYTES # BLD AUTO: 12.5 % — LOW (ref 20.5–51.1)
LYMPHOCYTES # BLD AUTO: 2.79 K/UL — SIGNIFICANT CHANGE UP (ref 1.2–3.4)
MAGNESIUM SERPL-MCNC: 2 MG/DL — SIGNIFICANT CHANGE UP (ref 1.8–2.4)
MCHC RBC-ENTMCNC: 29.8 PG — SIGNIFICANT CHANGE UP (ref 27–31)
MCHC RBC-ENTMCNC: 33.3 G/DL — SIGNIFICANT CHANGE UP (ref 32–37)
MCV RBC AUTO: 89.5 FL — SIGNIFICANT CHANGE UP (ref 80–94)
MONOCYTES # BLD AUTO: 1.35 K/UL — HIGH (ref 0.1–0.6)
MONOCYTES NFR BLD AUTO: 6 % — SIGNIFICANT CHANGE UP (ref 1.7–9.3)
NEUTROPHILS # BLD AUTO: 17.59 K/UL — HIGH (ref 1.4–6.5)
NEUTROPHILS NFR BLD AUTO: 78.5 % — HIGH (ref 42.2–75.2)
NRBC BLD AUTO-RTO: 0 /100 WBCS — SIGNIFICANT CHANGE UP (ref 0–0)
PLATELET # BLD AUTO: 204 K/UL — SIGNIFICANT CHANGE UP (ref 130–400)
PMV BLD: 11 FL — HIGH (ref 7.4–10.4)
POTASSIUM SERPL-MCNC: 3.9 MMOL/L — SIGNIFICANT CHANGE UP (ref 3.5–5)
POTASSIUM SERPL-SCNC: 3.9 MMOL/L — SIGNIFICANT CHANGE UP (ref 3.5–5)
PROT SERPL-MCNC: 6.1 G/DL — SIGNIFICANT CHANGE UP (ref 6–8)
RBC # BLD: 4.56 M/UL — LOW (ref 4.7–6.1)
RBC # FLD: 13.8 % — SIGNIFICANT CHANGE UP (ref 11.5–14.5)
SODIUM SERPL-SCNC: 143 MMOL/L — SIGNIFICANT CHANGE UP (ref 135–146)
WBC # BLD: 22.4 K/UL — HIGH (ref 4.8–10.8)
WBC # FLD AUTO: 22.4 K/UL — HIGH (ref 4.8–10.8)

## 2025-06-30 PROCEDURE — 99233 SBSQ HOSP IP/OBS HIGH 50: CPT

## 2025-06-30 RX ADMIN — BUPROPION HYDROBROMIDE 150 MILLIGRAM(S): 522 TABLET, EXTENDED RELEASE ORAL at 12:27

## 2025-06-30 RX ADMIN — Medication 10 MILLIGRAM(S): at 12:27

## 2025-06-30 RX ADMIN — Medication 10 MILLIGRAM(S): at 10:00

## 2025-06-30 RX ADMIN — PREDNISONE 20 MILLIGRAM(S): 20 TABLET ORAL at 05:38

## 2025-06-30 RX ADMIN — Medication 25 MILLIGRAM(S): at 14:47

## 2025-06-30 RX ADMIN — TAMSULOSIN HYDROCHLORIDE 0.4 MILLIGRAM(S): 0.4 CAPSULE ORAL at 21:22

## 2025-06-30 RX ADMIN — OLANZAPINE 20 MILLIGRAM(S): 10 TABLET ORAL at 21:22

## 2025-06-30 RX ADMIN — HEPARIN SODIUM 5000 UNIT(S): 1000 INJECTION INTRAVENOUS; SUBCUTANEOUS at 05:37

## 2025-06-30 RX ADMIN — ATORVASTATIN CALCIUM 40 MILLIGRAM(S): 80 TABLET, FILM COATED ORAL at 21:23

## 2025-06-30 RX ADMIN — Medication 2 TABLET(S): at 21:21

## 2025-06-30 RX ADMIN — Medication 1 APPLICATION(S): at 05:42

## 2025-06-30 RX ADMIN — Medication 25 MILLIGRAM(S): at 05:37

## 2025-06-30 RX ADMIN — FOLIC ACID 1 MILLIGRAM(S): 1 TABLET ORAL at 12:27

## 2025-06-30 RX ADMIN — HEPARIN SODIUM 5000 UNIT(S): 1000 INJECTION INTRAVENOUS; SUBCUTANEOUS at 21:22

## 2025-06-30 RX ADMIN — Medication 25 MILLIGRAM(S): at 21:22

## 2025-06-30 RX ADMIN — MEMANTINE HYDROCHLORIDE 5 MILLIGRAM(S): 21 CAPSULE, EXTENDED RELEASE ORAL at 21:22

## 2025-06-30 RX ADMIN — AMLODIPINE BESYLATE 10 MILLIGRAM(S): 10 TABLET ORAL at 05:37

## 2025-06-30 RX ADMIN — NICOTINE POLACRILEX 1 PATCH: 4 GUM, CHEWING ORAL at 12:28

## 2025-06-30 RX ADMIN — Medication 40 MILLIGRAM(S): at 05:38

## 2025-06-30 RX ADMIN — Medication 100 MILLIGRAM(S): at 12:27

## 2025-06-30 RX ADMIN — Medication 10 MILLIGRAM(S): at 18:10

## 2025-06-30 RX ADMIN — Medication 10 MILLIGRAM(S): at 18:13

## 2025-06-30 RX ADMIN — HEPARIN SODIUM 5000 UNIT(S): 1000 INJECTION INTRAVENOUS; SUBCUTANEOUS at 14:40

## 2025-06-30 RX ADMIN — POLYETHYLENE GLYCOL 3350 17 GRAM(S): 17 POWDER, FOR SOLUTION ORAL at 05:37

## 2025-06-30 NOTE — PROGRESS NOTE ADULT - SUBJECTIVE AND OBJECTIVE BOX
pt seen and examined.     My notes supersede resident's notes in case of discrepancy       ROS: no cp, no sob, no n/v, no fever    Vital Signs Last 24 Hrs  T(C): 36.4 (30 Jun 2025 05:00), Max: 37.4 (29 Jun 2025 19:44)  T(F): 97.5 (30 Jun 2025 05:00), Max: 99.3 (29 Jun 2025 19:44)  HR: 65 (30 Jun 2025 05:00) (65 - 106)  BP: 136/70 (30 Jun 2025 05:00) (123/62 - 136/75)  BP(mean): 92 (30 Jun 2025 05:00) (92 - 92)  RR: 18 (30 Jun 2025 05:00) (18 - 18)  SpO2: 98% (30 Jun 2025 05:00) (95% - 98%)    physical exam  constitutional NAD, AAOX3, Respiratory  lungs CTA, CVS heart RRR, GI: abdomen Soft NT, ND, BS+, skin: bilat lower ext hyperpigmentation   neuro exam no focal deficit     MEDICATIONS  (STANDING):  albuterol    90 MICROgram(s) HFA Inhaler 2 Puff(s) Inhalation two times a day  amLODIPine   Tablet 10 milliGRAM(s) Oral daily  atorvastatin 40 milliGRAM(s) Oral at bedtime  buPROPion XL (24-Hour) . 150 milliGRAM(s) Oral daily  chlorhexidine 2% Cloths 1 Application(s) Topical <User Schedule>  dicyclomine 10 milliGRAM(s) Oral three times a day before meals  fluPHENAZine 10 milliGRAM(s) Oral daily  fluticasone propionate/ salmeterol 500-50 MICROgram(s) Diskus 1 Dose(s) Inhalation two times a day  folic acid 1 milliGRAM(s) Oral daily  heparin   Injectable 5000 Unit(s) SubCutaneous every 8 hours  hydrALAZINE 25 milliGRAM(s) Oral three times a day  lactulose Syrup 20 Gram(s) Oral daily  lidocaine   4% Patch 1 Patch Transdermal daily  memantine 5 milliGRAM(s) Oral at bedtime  nicotine -  14 mG/24Hr(s) Patch 1 Patch Transdermal daily  OLANZapine 20 milliGRAM(s) Oral at bedtime  pantoprazole    Tablet 40 milliGRAM(s) Oral before breakfast  polyethylene glycol 3350 17 Gram(s) Oral two times a day  predniSONE   Tablet 20 milliGRAM(s) Oral daily  senna 2 Tablet(s) Oral at bedtime  tamsulosin 0.4 milliGRAM(s) Oral at bedtime  thiamine 100 milliGRAM(s) Oral daily    MEDICATIONS  (PRN):  albuterol/ipratropium for Nebulization 3 milliLiter(s) Nebulizer every 6 hours PRN Shortness of Breath and/or Wheezing  cyclobenzaprine 5 milliGRAM(s) Oral three times a day PRN Muscle Spasm  LORazepam     Tablet 0.5 milliGRAM(s) Oral every 8 hours PRN Anxiety  magnesium hydroxide Suspension 30 milliLiter(s) Oral daily PRN Constipation                        13.6   22.40 )-----------( 204      ( 30 Jun 2025 06:23 )             40.8     06-30    143  |  107  |  21[H]  ----------------------------<  92  3.9   |  22  |  1.7[H]    Ca    9.1      30 Jun 2025 06:23  Mg     2.0     06-30    TPro  6.1  /  Alb  4.0  /  TBili  0.3  /  DBili  x   /  AST  30  /  ALT  52[H]  /  AlkPhos  72  06-30    Procalcitonin: 0.05 ng/mL [0.02 - 0.10] (06-18-25 @ 12:41)    a/p    79-year-old male with past medical history of HTN, MDD, schizophrenia, dementia presents to the ED for left lower quadrant abdominal pain associated shortness of breath and cough. pt states he has been having this sudden onset of SOB and cough with deep inspiration. States he is an active smoker. Denies any fever, chest pain, nausea, vomiting, diarrhea, dysuria, hemoptysis, palpitations, lightheadedness, Sick contacts.     # Acute hypoxemic respiratory failure due to copd exacerebation , not pnuemonia ( xray has not shown any new xray changes/infiltrate)   cont nebs  cont tapering prednisone ( slowly)   bipap if needed  pt is not retaining co2  active smoker, counseled   possible sepsis poa ( ? bronchitis ) , which resolved, now the leukocytosis is probably due to steroids , trend cbc   sp antibiotics ( ended 6/27)   cont nebs    # Dysphagia   - Swallow FEES performed: mild oropharyngeal dysphagia, recommended consistency minced and moist with thin liquids    # franki on ckd , franki is improving , possible due to initial sepsis/dehydration   -S.Cr at baseline   creatinine trend  1.7 (06-30-25 @ 06:23)  1.8 (06-29-25 @ 07:33)  2.2 (06-27-25 @ 05:41)  2.1 (06-26-25 @ 06:13)    Creatinine, Serum: 1.5 mg/dL (03.25.22 @ 11:00)  monitor cr     #HTN, controlled   - C/w amlodipine, hydralazine    # schizophrenia, dementia, MDD  mood stable on psych meds    #Progress Note Handoff    Pending :  discharge planning  discussion: kris pt   Disposition:  Return to [facility] vs STR   code status: full code

## 2025-06-30 NOTE — PROGRESS NOTE ADULT - PROVIDER SPECIALTY LIST ADULT
Critical Care
Hospitalist
Hospitalist
Internal Medicine
Critical Care
Critical Care
Hospitalist
Hospitalist
Infectious Disease
Internal Medicine
Surgery
Critical Care
Hospitalist
Internal Medicine
Critical Care
Critical Care
Hospitalist
Internal Medicine
Internal Medicine
Surgery
Infectious Disease
Hospitalist
Infectious Disease
Infectious Disease

## 2025-06-30 NOTE — PROGRESS NOTE ADULT - ASSESSMENT
Assessment and Plan   79-year-old male with multiple comorbidities including HTN, schizophrenia, dementia, and MDD who was admitted for COPD exacerbation with hypoxemic respiratory failure. Patient has shown improvement with treatment, though still requires oxygen support. Initial concerns for sepsis have resolved, and current leukocytosis is likely steroid-induced. Kidney function is improving from initial KRYSTINA.    #Acute hypoxemic respiratory failure due to COPD exacerbation  - Continue nebulizer treatments  - Gradual prednisone taper  - BiPAP if needed  - No CO2 retention  - Smoking cessation counseling provided  - Completed antibiotics course on 6/27    #Dysphagia  - FEES shows mild oropharyngeal dysphagia  - Continue minced/moist diet with thin liquids    #KRYSTINA on CKD  - Improving kidney function  - Monitor creatinine  - Trending toward baseline    #HTN  - Well-controlled on current regimen    #Psychiatric conditions  - Stable on current medications    Pending: Patient evaluation, discharge planning, trending labs  Family Discussion: Discussed with patient  Disposition: Considering [facility] vs STR  Code Status: Full code

## 2025-06-30 NOTE — PROGRESS NOTE ADULT - REASON FOR ADMISSION
SOB
normal...

## 2025-06-30 NOTE — CHART NOTE - NSCHARTNOTEFT_GEN_A_CORE
Registered Dietitian Follow-Up     Patient Profile Reviewed                           Yes [x]   No []     Nutrition History Previously Obtained        Yes [x]  No []       Pertinent Medical Information: Patient is a 79y M with a PMHx of HTN, MDD, Schizophrenia, Dementia, presented for LLQ abdominal pain associated with SOB. Noted with acute hypoxic RF 2/2 COPD exacerbation?, KRYSTINA on CKD?.      Diet order: Diet, DASH/TLC:   Sodium & Cholesterol Restricted  Consistent Carbohydrate {No Snacks} (CSTCHO)  Minced and Moist (MINCEDMOIST)    Start Time: 23:00 (- @ 14:46) [Active]    Patient noted with anemia, elevated BUN, elevated creatinine, elevated ALT, low eGFR.       Anthropometrics:  Height: 172.7cm   Weight: 90.7kg  BMI: 30.4     Daily Weight in k (-28), Weight in k (-), Weight in k (-), Weight in k.3 (-), Weight in k.8 (-)  % Weight Change    MEDICATIONS  (STANDING):  albuterol    90 MICROgram(s) HFA Inhaler 2 Puff(s) Inhalation two times a day  amLODIPine   Tablet 10 milliGRAM(s) Oral daily  atorvastatin 40 milliGRAM(s) Oral at bedtime  buPROPion XL (24-Hour) . 150 milliGRAM(s) Oral daily  chlorhexidine 2% Cloths 1 Application(s) Topical <User Schedule>  dicyclomine 10 milliGRAM(s) Oral three times a day before meals  fluPHENAZine 10 milliGRAM(s) Oral daily  fluticasone propionate/ salmeterol 500-50 MICROgram(s) Diskus 1 Dose(s) Inhalation two times a day  folic acid 1 milliGRAM(s) Oral daily  heparin   Injectable 5000 Unit(s) SubCutaneous every 8 hours  hydrALAZINE 25 milliGRAM(s) Oral three times a day  lactulose Syrup 20 Gram(s) Oral daily  lidocaine   4% Patch 1 Patch Transdermal daily  memantine 5 milliGRAM(s) Oral at bedtime  nicotine -  14 mG/24Hr(s) Patch 1 Patch Transdermal daily  OLANZapine 20 milliGRAM(s) Oral at bedtime  pantoprazole    Tablet 40 milliGRAM(s) Oral before breakfast  polyethylene glycol 3350 17 Gram(s) Oral two times a day  predniSONE   Tablet 20 milliGRAM(s) Oral daily  senna 2 Tablet(s) Oral at bedtime  tamsulosin 0.4 milliGRAM(s) Oral at bedtime  thiamine 100 milliGRAM(s) Oral daily    MEDICATIONS  (PRN):  albuterol/ipratropium for Nebulization 3 milliLiter(s) Nebulizer every 6 hours PRN Shortness of Breath and/or Wheezing  cyclobenzaprine 5 milliGRAM(s) Oral three times a day PRN Muscle Spasm  LORazepam     Tablet 0.5 milliGRAM(s) Oral every 8 hours PRN Anxiety  magnesium hydroxide Suspension 30 milliLiter(s) Oral daily PRN Constipation    Pertinent Labs:  @ 06:23: Na 143, BUN 21[H], Cr 1.7[H], BG 92, K+ 3.9, Phos --, Mg 2.0, Alk Phos 72, ALT/SGPT 52[H], AST/SGOT 30, HbA1c --    Finger Sticks:    Physical Findings:  - Appearance: alert; has Dementia, although AOx3  - GI function: No s/s of dysfunction per patient; last BM was today per patient which was easy to pass. Last BM documented in flowsheet was on 25.   - Tubes: none  - Oral/Mouth cavity: Per SLP note recommendations on 25: Minced and Moist with Thin liquids  - Edema: none  - Skin: ecchymosis present; no pressure injuries      Nutrition Requirements: With consideration for age, weight, acute illness, CKD?  Weight Used: Current 90.7kg     Estimated Energy Needs    Continue []  Adjust [x]  1606-1927kcal/day using MSJ x 1-1.2  Estimated Protein Needs    Continue []  Adjust [x]   73-91g/day using 0.8-1g/kg    Estimated Fluid Needs        Continue []  Adjust [x]  1mL/kcal/day     Nutrient Intake: Patient reports eating % of meals recently. Reports being hungry for Lunch and was asking when Lunch would be coming.     [x] Previous Nutrition Diagnosis: Inadequate Oral Intake            [] Ongoing          [x] Resolved    [] No active nutrition diagnosis identified at this time     Nutrition Education: Encouraged patient to continue to maximize PO intake as able.     Goal/Expected Outcome: Patient to meet 75% or more of estimated nutrient needs via PO intake within 7 days    Interventions:  -Continue current diet order     Monitor:  -PO intake  -Diet/Diet texture tolerance  -Weight  -Nutrition related labs  -Nutrition focused physical findings  -GI function     Low Nutrition Risk Follow Up    Malcolm Guzman RD via Teams
Pt is being transferred from 3A to SDU    This is a 79-year-old male with a history of hypertension, major depressive disorder, schizophrenia, and dementia presented to the ED with sudden left lower quadrant abdominal pain, shortness of breath, and cough; suspected COPD/asthma exacerbation secondary to acute bacterial bronchitis although procal has been negative), (noted on CT chest showing emphysematous changes, atelectasis, and ground-glass lesions, ruled out PE), He was treated with increased steroids (IV Solumedrol), nebulizers, inhalers, and antibiotics (Rocephin, azithromycin initially) . Yesterday during this provider shift, he was found to have gram-positive bacteremia prompting an ID consult, repeat blood cultures, echocardiogram, and changes to his antibiotics- he currently remains on Rocephin and Flagyl, ID noted that blood cultures grew coagulase-negative staphylococci likely not requiring treatment. ID also suspects Acute sharp abd pain on admission and lactate on admission is possibly diverticulitis, with possible perforation, r/o vascular etiology. Therefore recommended Ct scan of the abdomen with PO/IV contrast.     His hypertension, improved on home medications and he had a creatinine of 2.1 from 1.6, suspected KRYSTINA on CKD stage 3 vs progression of CKD, ruled out hydronephrosis, treated with fluids.     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    Subsequently, a rapid response was called for hypoxia (desaturations to the 80s) during the abdominal CT scan to evaluate GI source, he received Solumedrol 125 mg IV, improved with high-flow nasal cannula and then BiPAP, had an elevated lactate, unremarkable ABG and was cleared for transfer to SDU after additional labs and blood cultures were obtained by this provider, with trial of lasix. He will remain on continuos BiPAP.     Things to follow up  ( ) Ct scan of the abdomen with PO/ IV contrast  ( ) STAT labs, rainbow and cultures  ( ) Will remain on Continous BiPap till Pulm eval in the AM  ( ) ID reeval in the AM
Transfer Note    Transfer from: SDU  Transfer to: med-surg    HPI:  Patient is a 80 yo male with a history of HTN, schizophrenia, MDD, and dementia who presented due to sudden left abdominal/flank pain, SOB, and cough with deep inspiration. He has a history of smoking and was thought to have suspected COPD/asthma exacerbation as CT chest showed emphysematous changes, atelectasis, and ground-glass lesions.  He was treated with steroids, nebulizers, inhalers, and antibiotics. He was found to have gram-positive bacteremia prompting an ID consult, repeat blood cultures, echocardiogram, and changes to his antibiotics. ID noted that blood cultures grew coagulase-negative staphylococci likely not requiring treatment. ID also suspected his acute abdominal pain  and elevated lactate on admission was concerning for diverticulitis with possible perforation. A rapid response was called due to desatting to the 80s during the abdominal CT scan to evaluate GI source, so he received solumedrol and improved with high-flow nasal cannula and then BiPAP. He was transferred to SDU.       COURSE:  In the SDU, the patient was initially on BIPAP and would regularly have coughing fits accompanied with pain along the left flank. Gradually, his pain improved with lidocaine patch, oxycodone, Flexeril and CT chest on 6/24 showed fracture of the left 10th rib. He was put on Zosyn due to aspiration event and is set to finish the course today. Gradually, his breathing and coughing fits improved and he was tolerating HFNC. He eventually was weaned to LFNC and was on 2L today without issue. He is stable for downgrade to floors.      Vital Signs Last 24 Hrs  T(C): 35.8 (27 Jun 2025 11:23), Max: 36.6 (27 Jun 2025 05:37)  T(F): 96.4 (27 Jun 2025 11:23), Max: 97.8 (27 Jun 2025 05:37)  HR: 72 (27 Jun 2025 07:28) (69 - 88)  BP: 130/70 (27 Jun 2025 11:23) (117/57 - 130/70)  BP(mean): 92 (27 Jun 2025 11:23) (80 - 92)  RR: 18 (27 Jun 2025 11:23) (18 - 18)  SpO2: 98% (27 Jun 2025 11:23) (94% - 98%)    Parameters below as of 27 Jun 2025 05:37  Patient On (Oxygen Delivery Method): nasal cannula  O2 Flow (L/min): 2      PHYSICAL EXAM:  GENERAL: resting on 2L LFNC, no acute respiratory distress   CHEST/LUNG: Clear to auscultation bilaterally; No rales, rhonchi, wheezing, or rubs  HEART: Regular rate and rhythm; No murmurs, rubs, or gallops  ABDOMEN: Soft, Nontender, Nondistended; Bowel sounds present  NEURO: Alert & Oriented X3  EXTREMITIES: No LE edema, no calf tenderness  SKIN: No rashes or lesions      I&O's Summary    26 Jun 2025 07:01  -  27 Jun 2025 07:00  --------------------------------------------------------  IN: 440 mL / OUT: 2550 mL / NET: -2110 mL    27 Jun 2025 07:01  -  27 Jun 2025 18:07  --------------------------------------------------------  IN: 0 mL / OUT: 1400 mL / NET: -1400 mL          MEDICATIONS  (STANDING):  albuterol    90 MICROgram(s) HFA Inhaler 2 Puff(s) Inhalation two times a day  amLODIPine   Tablet 10 milliGRAM(s) Oral daily  atorvastatin 40 milliGRAM(s) Oral at bedtime  buPROPion XL (24-Hour) . 150 milliGRAM(s) Oral daily  chlorhexidine 2% Cloths 1 Application(s) Topical <User Schedule>  dicyclomine 10 milliGRAM(s) Oral three times a day before meals  fluPHENAZine 10 milliGRAM(s) Oral daily  fluticasone propionate/ salmeterol 500-50 MICROgram(s) Diskus 1 Dose(s) Inhalation two times a day  folic acid 1 milliGRAM(s) Oral daily  heparin   Injectable 5000 Unit(s) SubCutaneous every 8 hours  hydrALAZINE 25 milliGRAM(s) Oral three times a day  lactulose Syrup 20 Gram(s) Oral daily  lidocaine   4% Patch 1 Patch Transdermal daily  memantine 5 milliGRAM(s) Oral at bedtime  nicotine -  14 mG/24Hr(s) Patch 1 Patch Transdermal daily  OLANZapine 20 milliGRAM(s) Oral at bedtime  pantoprazole    Tablet 40 milliGRAM(s) Oral before breakfast  piperacillin/tazobactam IVPB.. 3.375 Gram(s) IV Intermittent every 8 hours  polyethylene glycol 3350 17 Gram(s) Oral two times a day  predniSONE   Tablet   Oral   senna 2 Tablet(s) Oral at bedtime  tamsulosin 0.4 milliGRAM(s) Oral at bedtime  thiamine 100 milliGRAM(s) Oral daily    MEDICATIONS  (PRN):  albuterol/ipratropium for Nebulization 3 milliLiter(s) Nebulizer every 6 hours PRN Shortness of Breath and/or Wheezing  cyclobenzaprine 5 milliGRAM(s) Oral three times a day PRN Muscle Spasm  LORazepam   Injectable 0.5 milliGRAM(s) IV Push every 8 hours PRN Agitation/anxiety  magnesium hydroxide Suspension 30 milliLiter(s) Oral daily PRN Constipation  melatonin 3 milliGRAM(s) Oral at bedtime PRN Insomnia  ondansetron Injectable 4 milliGRAM(s) IV Push every 8 hours PRN Nausea and/or Vomiting  oxycodone    5 mG/acetaminophen 325 mG 1 Tablet(s) Oral every 6 hours PRN Severe Pain (7 - 10)        LABS                                            12.7                  Neurophils% (auto):   x      (06-27 @ 05:41):    15.60)-----------(191          Lymphocytes% (auto):  x                                             38.1                   Eosinphils% (auto):   x        Manual%: Neutrophils x    ; Lymphocytes x    ; Eosinophils x    ; Bands%: x    ; Blasts x                                    140    |  106    |  24                  Calcium: 8.2   / iCa: x      (06-27 @ 05:41)    ----------------------------<  105       Magnesium: 2.1                              3.9     |  21     |  2.2              Phosphorous: x        TPro  5.8    /  Alb  3.8    /  TBili  0.4    /  DBili  x      /  AST  27     /  ALT  26     /  AlkPhos  70     27 Jun 2025 05:41        Sodium: 141 mmol/L (06-25-25 @ 05:26)  Sodium: 137 mmol/L (06-26-25 @ 06:13)  Sodium: 140 mmol/L (06-27-25 @ 05:41)    Potassium: 3.9 mmol/L (06-25-25 @ 05:26)  Potassium: 4.3 mmol/L (06-26-25 @ 06:13)  Potassium: 3.9 mmol/L (06-27-25 @ 05:41)    Creatinine: 2.0 mg/dL *H* (06-25-25 @ 05:26)  Creatinine: 2.1 mg/dL *H* (06-26-25 @ 06:13)  Creatinine: 2.2 mg/dL *H* (06-27-25 @ 05:41)    Hemoglobin: 12.7 g/dL *L* (06-25-25 @ 05:26)  Hemoglobin: 14.0 g/dL (06-26-25 @ 06:13)  Hemoglobin: 12.7 g/dL *L* (06-27-25 @ 05:41)    Platelet Count - Automated: 176 K/uL (06-25-25 @ 05:26)  Platelet Count - Automated: 197 K/uL (06-26-25 @ 06:13)  Platelet Count - Automated: 191 K/uL (06-27-25 @ 05:41)    WBC Count: 11.79 K/uL *H* (06-25-25 @ 05:26)  WBC Count: 9.78 K/uL (06-26-25 @ 06:13)  WBC Count: 15.60 K/uL *H* (06-27-25 @ 05:41)    Lactate, Blood: 2.2 mmol/L *H* (06-21-25 @ 01:18)  Lactate, Blood: 1.3 mmol/L (06-21-25 @ 05:00)  Lactate, Blood: 1.3 mmol/L (06-23-25 @ 11:03)      ASSESSMENT AND PLAN:  80 yo male with a history of HTN, schizophrenia, dementia, and MDD presented to the ED due to sudden left-sided flank pain accompanied by SOB and cough that seems to exacerbate the pain. He is a current smoker. He developed AHRF associated with aspiration pneumonia and suspected COPD exacerbation. He is now on LFNC.    #Acute hypoxemic respiratory failure  #Aspiration pneumonia  #COPD exacerbation  #Left flank pain secondary to possible radiculopathy  #Constipation  - CTA Chest 6/17: Negative for pulmonary emboli. Emphysematous changes. Bibasilar atelectasis. Areas of ground glass attenuation which could reflect air trapping/airway disease.  - CT A/P 6/17: no acute abdominal findings  - CXR 6/24: B/L lower lobe opacities  - CT A/P 6/24: No significant change from recent CT. No CT evidence of acute abdominal pathology. B/L basilar consolidation secondary to aspiration.  - CT chest 6/24: B/L consolidations/atelectasis suggestive of aspiration and fracture of left 10th rib (likely causing his flank pain)  - Lipase wnl  - MRSA negative, vancomycin was d/c.   - C/w zosyn due to previous aspiration event   - Tolerating 2L LFNC and pain has greatly improved  - Pulmonary following: recommended HOB 45 degrees, aspiration precaution, NIV during sleep and prn during the day, wean O2 as tolerated, prednisone taper, nebs q6 prn, chest PT, IPV q8h, incentive spirometry, repeat CXR  - Feeding per speech/swallow eval  - C/w lactulose, miralax, senna  - C/w pain relief regimen (lidocaine patch, oxycodone, Flexeril)  - Reglan ppx prn for nausea  - ID following: c/w Zosyn until 6/27  - Swallow FEES performed: mild oropharyngeal dysphagia, recommended consistency minced and moist with thin liquids  - PT eval      #Acute on chronic renal failure  #Lactic acidosis  - Elevated Cr at 2.2 and BUN at 24, eGFR 30  - Lactate elevated at 2.8  - C/w Flomax  - Monitor BMP and correct as needed  - Monitor I&Os      #HTN, schizophrenia, dementia, MDD  - C/w amlodipine, hydralazine, psych meds    #MISC  -DVT ppx: heparin SQ  -GI ppx: protonix  -DIET: DASH/TLC  -Activity: AAT  -Code Status: Full   -DIspo: DGTF    -Pending: PT, prednisone taper, IPV, chest PT, incentive spirometry, continue pain assessment, keep on LFNC and wean as tolerated, monitor BMP, finish Zosyn course
pt admitted for URI, STAT critical result., blood cultures with GPCs in pairs, previously on Rocephin and Zithromax, started on Vancomycin, Daily blood cultures until negative, ID eval requested, endorsed to day resident

## 2025-06-30 NOTE — PROGRESS NOTE ADULT - SUBJECTIVE AND OBJECTIVE BOX
SUBJECTIVE:    Patient is a 79y old Male who presents with a chief complaint of SOB (29 Jun 2025 10:13)    Currently admitted to medicine with the primary diagnosis of COPD exacerbation    Today is hospital day 13d. This morning he is resting in bed and reports no new issues or overnight events.     PAST MEDICAL & SURGICAL HISTORY  Schizophrenia    No significant past surgical history      SOCIAL HISTORY:  Negative for smoking/alcohol/drug use.     ALLERGIES:  No Known Allergies    MEDICATIONS:  STANDING MEDICATIONS  albuterol    90 MICROgram(s) HFA Inhaler 2 Puff(s) Inhalation two times a day  amLODIPine   Tablet 10 milliGRAM(s) Oral daily  atorvastatin 40 milliGRAM(s) Oral at bedtime  buPROPion XL (24-Hour) . 150 milliGRAM(s) Oral daily  chlorhexidine 2% Cloths 1 Application(s) Topical <User Schedule>  dicyclomine 10 milliGRAM(s) Oral three times a day before meals  fluPHENAZine 10 milliGRAM(s) Oral daily  fluticasone propionate/ salmeterol 500-50 MICROgram(s) Diskus 1 Dose(s) Inhalation two times a day  folic acid 1 milliGRAM(s) Oral daily  heparin   Injectable 5000 Unit(s) SubCutaneous every 8 hours  hydrALAZINE 25 milliGRAM(s) Oral three times a day  lactulose Syrup 20 Gram(s) Oral daily  lidocaine   4% Patch 1 Patch Transdermal daily  memantine 5 milliGRAM(s) Oral at bedtime  nicotine -  14 mG/24Hr(s) Patch 1 Patch Transdermal daily  OLANZapine 20 milliGRAM(s) Oral at bedtime  pantoprazole    Tablet 40 milliGRAM(s) Oral before breakfast  polyethylene glycol 3350 17 Gram(s) Oral two times a day  predniSONE   Tablet 20 milliGRAM(s) Oral daily  senna 2 Tablet(s) Oral at bedtime  tamsulosin 0.4 milliGRAM(s) Oral at bedtime  thiamine 100 milliGRAM(s) Oral daily    PRN MEDICATIONS  albuterol/ipratropium for Nebulization 3 milliLiter(s) Nebulizer every 6 hours PRN  cyclobenzaprine 5 milliGRAM(s) Oral three times a day PRN  LORazepam     Tablet 0.5 milliGRAM(s) Oral every 8 hours PRN  magnesium hydroxide Suspension 30 milliLiter(s) Oral daily PRN    VITALS:   T(F): 97.5  HR: 65  BP: 136/70  RR: 18  SpO2: 98%    LABS:                        13.6   22.40 )-----------( 204      ( 30 Jun 2025 06:23 )             40.8     06-30    143  |  107  |  21[H]  ----------------------------<  92  3.9   |  22  |  1.7[H]    Ca    9.1      30 Jun 2025 06:23  Mg     2.0     06-30    TPro  6.1  /  Alb  4.0  /  TBili  0.3  /  DBili  x   /  AST  30  /  ALT  52[H]  /  AlkPhos  72  06-30      Urinalysis Basic - ( 30 Jun 2025 06:23 )    Color: x / Appearance: x / SG: x / pH: x  Gluc: 92 mg/dL / Ketone: x  / Bili: x / Urobili: x   Blood: x / Protein: x / Nitrite: x   Leuk Esterase: x / RBC: x / WBC x   Sq Epi: x / Non Sq Epi: x / Bacteria: x    PHYSICAL EXAM:    GEN: No acute distress, left flank soreness  LUNGS: Expiratory wheeze, bilateral inspiratory crackles  HEART: Regular, normal heart sounds  ABD: Soft, non-tender, non-distended  EXT: NC/NC/NE/2+PP/OLSON/Skin Intact  NEURO: AAOX3

## 2025-07-01 VITALS — HEART RATE: 75 BPM | DIASTOLIC BLOOD PRESSURE: 62 MMHG | SYSTOLIC BLOOD PRESSURE: 123 MMHG

## 2025-07-01 LAB
ALBUMIN SERPL ELPH-MCNC: 3.4 G/DL — LOW (ref 3.5–5.2)
ALP SERPL-CCNC: 67 U/L — SIGNIFICANT CHANGE UP (ref 30–115)
ALT FLD-CCNC: 48 U/L — HIGH (ref 0–41)
ANION GAP SERPL CALC-SCNC: 12 MMOL/L — SIGNIFICANT CHANGE UP (ref 7–14)
AST SERPL-CCNC: 27 U/L — SIGNIFICANT CHANGE UP (ref 0–41)
BASOPHILS # BLD AUTO: 0.06 K/UL — SIGNIFICANT CHANGE UP (ref 0–0.2)
BASOPHILS NFR BLD AUTO: 0.3 % — SIGNIFICANT CHANGE UP (ref 0–1)
BILIRUB SERPL-MCNC: 0.3 MG/DL — SIGNIFICANT CHANGE UP (ref 0.2–1.2)
BUN SERPL-MCNC: 23 MG/DL — HIGH (ref 10–20)
CALCIUM SERPL-MCNC: 8.7 MG/DL — SIGNIFICANT CHANGE UP (ref 8.4–10.5)
CHLORIDE SERPL-SCNC: 107 MMOL/L — SIGNIFICANT CHANGE UP (ref 98–110)
CO2 SERPL-SCNC: 22 MMOL/L — SIGNIFICANT CHANGE UP (ref 17–32)
CREAT SERPL-MCNC: 1.7 MG/DL — HIGH (ref 0.7–1.5)
EGFR: 41 ML/MIN/1.73M2 — LOW
EGFR: 41 ML/MIN/1.73M2 — LOW
EOSINOPHIL # BLD AUTO: 0.04 K/UL — SIGNIFICANT CHANGE UP (ref 0–0.7)
EOSINOPHIL NFR BLD AUTO: 0.2 % — SIGNIFICANT CHANGE UP (ref 0–8)
GLUCOSE BLDC GLUCOMTR-MCNC: 144 MG/DL — HIGH (ref 70–99)
GLUCOSE SERPL-MCNC: 76 MG/DL — SIGNIFICANT CHANGE UP (ref 70–99)
HCT VFR BLD CALC: 39.6 % — LOW (ref 42–52)
HGB BLD-MCNC: 13 G/DL — LOW (ref 14–18)
IMM GRANULOCYTES NFR BLD AUTO: 2 % — HIGH (ref 0.1–0.3)
LYMPHOCYTES # BLD AUTO: 19.4 % — LOW (ref 20.5–51.1)
LYMPHOCYTES # BLD AUTO: 3.38 K/UL — SIGNIFICANT CHANGE UP (ref 1.2–3.4)
MAGNESIUM SERPL-MCNC: 1.9 MG/DL — SIGNIFICANT CHANGE UP (ref 1.8–2.4)
MCHC RBC-ENTMCNC: 30.1 PG — SIGNIFICANT CHANGE UP (ref 27–31)
MCHC RBC-ENTMCNC: 32.8 G/DL — SIGNIFICANT CHANGE UP (ref 32–37)
MCV RBC AUTO: 91.7 FL — SIGNIFICANT CHANGE UP (ref 80–94)
MONOCYTES # BLD AUTO: 1.29 K/UL — HIGH (ref 0.1–0.6)
MONOCYTES NFR BLD AUTO: 7.4 % — SIGNIFICANT CHANGE UP (ref 1.7–9.3)
NEUTROPHILS # BLD AUTO: 12.31 K/UL — HIGH (ref 1.4–6.5)
NEUTROPHILS NFR BLD AUTO: 70.7 % — SIGNIFICANT CHANGE UP (ref 42.2–75.2)
NRBC BLD AUTO-RTO: 0 /100 WBCS — SIGNIFICANT CHANGE UP (ref 0–0)
PLATELET # BLD AUTO: 189 K/UL — SIGNIFICANT CHANGE UP (ref 130–400)
PMV BLD: 11.4 FL — HIGH (ref 7.4–10.4)
POTASSIUM SERPL-MCNC: 4.3 MMOL/L — SIGNIFICANT CHANGE UP (ref 3.5–5)
POTASSIUM SERPL-SCNC: 4.3 MMOL/L — SIGNIFICANT CHANGE UP (ref 3.5–5)
PROT SERPL-MCNC: 5.7 G/DL — LOW (ref 6–8)
RBC # BLD: 4.32 M/UL — LOW (ref 4.7–6.1)
RBC # FLD: 14.2 % — SIGNIFICANT CHANGE UP (ref 11.5–14.5)
SODIUM SERPL-SCNC: 141 MMOL/L — SIGNIFICANT CHANGE UP (ref 135–146)
WBC # BLD: 17.42 K/UL — HIGH (ref 4.8–10.8)
WBC # FLD AUTO: 17.42 K/UL — HIGH (ref 4.8–10.8)

## 2025-07-01 RX ORDER — AMLODIPINE BESYLATE 10 MG/1
1 TABLET ORAL
Qty: 30 | Refills: 2
Start: 2025-07-01 | End: 2025-09-28

## 2025-07-01 RX ORDER — LISINOPRIL 5 MG/1
1 TABLET ORAL
Refills: 0 | DISCHARGE

## 2025-07-01 RX ORDER — ATORVASTATIN CALCIUM 80 MG/1
1 TABLET, FILM COATED ORAL
Qty: 0 | Refills: 0 | DISCHARGE
Start: 2025-07-01

## 2025-07-01 RX ORDER — AMLODIPINE BESYLATE 10 MG/1
1 TABLET ORAL
Refills: 0 | DISCHARGE

## 2025-07-01 RX ORDER — OLMESARTAN MEDOXOMIL 5 MG/1
1 TABLET, FILM COATED ORAL
Refills: 0 | DISCHARGE

## 2025-07-01 RX ORDER — TAMSULOSIN HYDROCHLORIDE 0.4 MG/1
1 CAPSULE ORAL
Qty: 0 | Refills: 0 | DISCHARGE
Start: 2025-07-01

## 2025-07-01 RX ADMIN — HEPARIN SODIUM 5000 UNIT(S): 1000 INJECTION INTRAVENOUS; SUBCUTANEOUS at 15:54

## 2025-07-01 RX ADMIN — Medication 25 MILLIGRAM(S): at 15:54

## 2025-07-01 RX ADMIN — Medication 10 MILLIGRAM(S): at 17:54

## 2025-07-01 RX ADMIN — Medication 10 MILLIGRAM(S): at 12:59

## 2025-07-01 RX ADMIN — BUPROPION HYDROBROMIDE 150 MILLIGRAM(S): 522 TABLET, EXTENDED RELEASE ORAL at 12:57

## 2025-07-01 RX ADMIN — PREDNISONE 20 MILLIGRAM(S): 20 TABLET ORAL at 05:50

## 2025-07-01 RX ADMIN — Medication 40 MILLIGRAM(S): at 05:50

## 2025-07-01 RX ADMIN — NICOTINE POLACRILEX 1 PATCH: 4 GUM, CHEWING ORAL at 12:59

## 2025-07-01 RX ADMIN — HEPARIN SODIUM 5000 UNIT(S): 1000 INJECTION INTRAVENOUS; SUBCUTANEOUS at 05:50

## 2025-07-01 RX ADMIN — Medication 100 MILLIGRAM(S): at 12:57

## 2025-07-01 RX ADMIN — Medication 1 APPLICATION(S): at 05:51

## 2025-07-01 RX ADMIN — AMLODIPINE BESYLATE 10 MILLIGRAM(S): 10 TABLET ORAL at 05:50

## 2025-07-01 RX ADMIN — Medication 10 MILLIGRAM(S): at 12:57

## 2025-07-01 RX ADMIN — Medication 10 MILLIGRAM(S): at 07:07

## 2025-07-01 RX ADMIN — FOLIC ACID 1 MILLIGRAM(S): 1 TABLET ORAL at 12:58

## 2025-07-01 RX ADMIN — Medication 1 DOSE(S): at 09:57

## 2025-07-01 RX ADMIN — IPRATROPIUM BROMIDE AND ALBUTEROL SULFATE 3 MILLILITER(S): .5; 2.5 SOLUTION RESPIRATORY (INHALATION) at 08:31

## 2025-07-01 NOTE — DISCHARGE NOTE NURSING/CASE MANAGEMENT/SOCIAL WORK - PATIENT PORTAL LINK FT
You can access the FollowMyHealth Patient Portal offered by [facility] by registering at the following website: [email or web address]. By joining [facility]’s FollowMyHealth portal, you will also be able to view your health information using other applications (apps) compatible with our system.

## 2025-07-01 NOTE — PHYSICAL THERAPY INITIAL EVALUATION ADULT - GENERAL OBSERVATIONS, REHAB EVAL
Pt encountered semiofowler in bed in NAD,+ nebulizer, currently O2 2 lpm via NC off. Maria Luisa RN notified, stated pt uses for comfot. SPO2 on RA with deep breathing ex's 96-97%. Pt agreeable to PT. 8:55-9:50. Pt encountered semiofowler in bed in NAD,+ nebulizer, currently O2 2 lpm via NC off. Maria Luisa LI notified, stated pt uses for comfot. SPO2 on RA with deep breathing ex's 96-97%. Pt agreeable to PT.

## 2025-07-01 NOTE — DISCHARGE NOTE PROVIDER - NSDCCPGOAL_GEN_ALL_CORE_FT
What is a COPD exacerbation?    A flare-up or worsening of your COPD symptoms  Usually includes increased breathlessness, coughing, mucus production, and wheezing  Can be triggered by infections, air pollution, or other irritants  Warning Signs to Watch For:    Increased shortness of breath or wheezing  Change in color, amount, or thickness of mucus  Increased coughing  Fever  Swelling in your ankles or legs  Difficulty sleeping due to breathing problems  More difficulty performing daily activities  Need to use rescue inhaler more often than usual  When to Seek Immediate Medical Care:    Severe shortness of breath, even at rest  Inability to speak in full sentences  Blue or gray lips or fingernails  Fast or irregular heartbeat  Confusion or extreme drowsiness  Chest pain  Prevention Steps:    Take all medications as prescribed  Use inhalers correctly (technique reviewed during hospitalization)  Avoid triggers like:  Tobacco smoke  Air pollution  Strong fumes or odors  Extreme weather  Get recommended vaccinations (flu, pneumonia)  Practice good hand hygiene  Attend all follow-up appointments  Daily Management:    Use maintenance inhalers as prescribed  Keep rescue inhaler available  Follow your exercise plan as recommended by physical therapy  Maintain good nutrition  Get adequate rest  Keep a diary of symptoms if recommended by your doctor  Remember:    Keep all follow-up appointments  Refill medications before they run out  Have an action plan for worsening symptoms  Know your healthcare provider's contact information  Continue smoking cessation efforts if applicable  Keep rescue medications easily accessible

## 2025-07-01 NOTE — PHYSICAL THERAPY INITIAL EVALUATION ADULT - PERTINENT HX OF CURRENT PROBLEM, REHAB EVAL
Patient is a 79-year-old male admitted for SOB. Pt with past medical history of HTN, MDD, schizophrenia, dementia presents to the ED for left lower quadrant abdominal pain associated shortness of breath and cough. pt states he has been having this sudden onset of SOB and cough with deep inspiration. States he is an active smoker. Denies any fever, chest pain, nausea, vomiting, diarrhea, dysuria, hemoptysis, palpitations, lightheadedness, Sick contacts.

## 2025-07-01 NOTE — DISCHARGE NOTE PROVIDER - HOSPITAL COURSE
Hospital Course: Mr. Pepe was admitted on June 17, 2025, with acute hypoxemic respiratory failure secondary to COPD exacerbation. Initially, he required high-flow oxygen support and was started on aggressive bronchodilator therapy, including nebulized albuterol/ipratropium and systemic corticosteroids. He received a course of antibiotics which was completed on 6/27. During his stay, he was found to have mild oropharyngeal dysphagia on FEES evaluation and was placed on a minced/moist diet with thin liquids. Nutrition services evaluated him as low nutrition risk, with goals set for meeting 75% of nutritional needs orally. Physical therapy evaluation revealed that patient requires supervision with mobility, using a rolling walker for ambulation with contact guard assistance, and demonstrates moderate fall risk (Tinetti score 22/28). The patient also presented with KRYSTINA on CKD which has shown steady improvement with appropriate fluid management. His psychiatric conditions, including schizophrenia, remained stable on his home medication regimen. Over the 13-day hospitalization, he has demonstrated gradual improvement in his respiratory status, though still requiring some oxygen support. His initial sepsis concerns have resolved, with current leukocytosis attributed to steroid effect. Throughout his stay, his blood pressure remained well-controlled on his current antihypertensive regimen.    #COPD Exacerbation  Continue prednisone taper  Resume home inhalers  Follow up with pulmonology within 1 week  Smoking cessation counseling provided  Continue oxygen supplementation as needed    #Dysphagia/Nutrition issues   Continue minced/moist diet  Speech therapy follow-up arranged  Aspiration precautions reviewed  Diet education provided  Goal to maintain 75% or more of nutritional needs orally  Monitor weight and oral intake  Follow up with dietitian as needed    #KRYSTINA on CKD  Follow up with nephrology within 2 weeks  Continue monitoring renal function  Medication adjustments made based on renal function    #Psychiatric Conditions  Continue all psychiatric medications unchanged  Follow up with psychiatrist as scheduled  Provided resources for outpatient mental health support    #Mobility Issues   Home PT arranged 3-5x/week  Continue using rolling walker for all mobility  Exercise program provided for lower extremity strengthening  Fall precautions reviewed  Return to assisted living facility with appropriate support  Continue prescribed exercises: hip flexion, knee flexion/extension, ankle pumps (10 reps each)    #General Care  Primary care follow-up within 1 week  Home health services arranged  Medication reconciliation completed  Patient education provided regarding warning signs and when to seek medical attention  Return to [facility] assisted living facility  Follow-up appointments scheduled  All discharge instructions reviewed with patient

## 2025-07-01 NOTE — DISCHARGE NOTE NURSING/CASE MANAGEMENT/SOCIAL WORK - FINANCIAL ASSISTANCE
[facility] provides services at a reduced cost to those who are determined to be eligible through [facility]’s financial assistance program. Information regarding [facility]’s financial assistance program can be found by going to [email or web address] or by calling [phone].

## 2025-07-01 NOTE — DISCHARGE NOTE PROVIDER - CARE PROVIDER_API CALL
Cecelia Handley  Internal Medicine  [address] [place] [id number]  Phone: [phone]  Fax: [phone]  Follow Up Time: 2 weeks

## 2025-07-01 NOTE — DISCHARGE NOTE PROVIDER - NSDCMRMEDTOKEN_GEN_ALL_CORE_FT
Advair Diskus 250 mcg-50 mcg/inh inhalation powder: 1 puff(s) inhaled 2 times a day  atorvastatin 40 mg oral tablet: 1 tab(s) orally once a day (at bedtime)  Benicar 40 mg oral tablet: 1 tab(s) orally once a day  buPROPion 150 mg/24 hours (XL) oral tablet, extended release: 1 tab(s) orally once a day  fluPHENAZine 10 mg oral tablet: 1 tab(s) orally once a day  folic acid 0.4 mg oral tablet: 1 tab(s) orally once a day  lisinopril 20 mg oral tablet: 1 tab(s) orally once a day  loratadine 10 mg oral tablet: 1 tab(s) orally once a day (at bedtime)  Namenda 5 mg oral tablet: 1 tab(s) orally once a day (at bedtime)  Norvasc 5 mg oral tablet: 1 tab(s) orally once a day  OLANZapine 20 mg oral tablet: 1 tab(s) orally once a day (at bedtime)  omeprazole 40 mg oral delayed release capsule: 1 cap(s) orally once a day  tamsulosin 0.4 mg oral capsule: 1 cap(s) orally once a day (at bedtime)  thiamine 100 mg oral tablet: 1 tab(s) orally once a day

## 2025-07-01 NOTE — DISCHARGE NOTE PROVIDER - NSDCCPCAREPLAN_GEN_ALL_CORE_FT
PRINCIPAL DISCHARGE DIAGNOSIS  Diagnosis: COPD exacerbation  Assessment and Plan of Treatment: What is a COPD exacerbation?  A flare-up or worsening of your COPD symptoms  Usually includes increased breathlessness, coughing, mucus production, and wheezing  Can be triggered by infections, air pollution, or other irritants  Warning Signs to Watch For:  Increased shortness of breath or wheezing  Change in color, amount, or thickness of mucus  Increased coughing  Fever  Swelling in your ankles or legs  Difficulty sleeping due to breathing problems  More difficulty performing daily activities  Need to use rescue inhaler more often than usual  When to Seek Immediate Medical Care:  Severe shortness of breath, even at rest  Inability to speak in full sentences  Blue or gray lips or fingernails  Fast or irregular heartbeat  Confusion or extreme drowsiness  Chest pain  Prevention Steps:  Take all medications as prescribed  Use inhalers correctly (technique reviewed during hospitalization)  Avoid triggers like:  Tobacco smoke  Air pollution  Strong fumes or odors  Extreme weather  Get recommended vaccinations (flu, pneumonia)  Practice good hand hygiene  Attend all follow-up appointments  Daily Management:  Use maintenance inhalers as prescribed  Keep rescue inhaler available  Follow your exercise plan as recommended by physical therapy  Maintain good nutrition  Get adequate rest  Keep a diary of symptoms if recommended by your doctor  Remember:  Keep all follow-up appointments  Refill medications before they run out  Have an action plan for worsening symptoms  Know your healthcare provider's contact information  Continue smoking cessation efforts if applicable  Keep rescue medications easily accessible      SECONDARY DISCHARGE DIAGNOSES  Diagnosis: Acute respiratory failure with hypoxia  Assessment and Plan of Treatment:     Diagnosis: KRYSTINA (acute kidney injury)  Assessment and Plan of Treatment:

## 2025-07-07 DIAGNOSIS — N18.30 CHRONIC KIDNEY DISEASE, STAGE 3 UNSPECIFIED: ICD-10-CM

## 2025-07-07 DIAGNOSIS — M54.10 RADICULOPATHY, SITE UNSPECIFIED: ICD-10-CM

## 2025-07-07 DIAGNOSIS — J96.01 ACUTE RESPIRATORY FAILURE WITH HYPOXIA: ICD-10-CM

## 2025-07-07 DIAGNOSIS — F17.200 NICOTINE DEPENDENCE, UNSPECIFIED, UNCOMPLICATED: ICD-10-CM

## 2025-07-07 DIAGNOSIS — D84.9 IMMUNODEFICIENCY, UNSPECIFIED: ICD-10-CM

## 2025-07-07 DIAGNOSIS — F20.89 OTHER SCHIZOPHRENIA: ICD-10-CM

## 2025-07-07 DIAGNOSIS — R13.12 DYSPHAGIA, OROPHARYNGEAL PHASE: ICD-10-CM

## 2025-07-07 DIAGNOSIS — J44.1 CHRONIC OBSTRUCTIVE PULMONARY DISEASE WITH (ACUTE) EXACERBATION: ICD-10-CM

## 2025-07-07 DIAGNOSIS — K59.00 CONSTIPATION, UNSPECIFIED: ICD-10-CM

## 2025-07-07 DIAGNOSIS — J98.11 ATELECTASIS: ICD-10-CM

## 2025-07-07 DIAGNOSIS — I16.0 HYPERTENSIVE URGENCY: ICD-10-CM

## 2025-07-07 DIAGNOSIS — J69.0 PNEUMONITIS DUE TO INHALATION OF FOOD AND VOMIT: ICD-10-CM

## 2025-07-07 DIAGNOSIS — E87.20 ACIDOSIS, UNSPECIFIED: ICD-10-CM

## 2025-07-07 DIAGNOSIS — F03.93 UNSPECIFIED DEMENTIA, UNSPECIFIED SEVERITY, WITH MOOD DISTURBANCE: ICD-10-CM

## 2025-07-07 DIAGNOSIS — A41.9 SEPSIS, UNSPECIFIED ORGANISM: ICD-10-CM

## 2025-07-07 DIAGNOSIS — F32.9 MAJOR DEPRESSIVE DISORDER, SINGLE EPISODE, UNSPECIFIED: ICD-10-CM

## 2025-07-07 DIAGNOSIS — N17.9 ACUTE KIDNEY FAILURE, UNSPECIFIED: ICD-10-CM
